# Patient Record
Sex: MALE | Race: WHITE | NOT HISPANIC OR LATINO | Employment: FULL TIME | URBAN - NONMETROPOLITAN AREA
[De-identification: names, ages, dates, MRNs, and addresses within clinical notes are randomized per-mention and may not be internally consistent; named-entity substitution may affect disease eponyms.]

---

## 2017-01-16 ENCOUNTER — OFFICE VISIT (OUTPATIENT)
Dept: GASTROENTEROLOGY | Facility: CLINIC | Age: 67
End: 2017-01-16

## 2017-01-16 VITALS
TEMPERATURE: 96.9 F | DIASTOLIC BLOOD PRESSURE: 90 MMHG | SYSTOLIC BLOOD PRESSURE: 132 MMHG | WEIGHT: 168 LBS | BODY MASS INDEX: 22.75 KG/M2 | HEIGHT: 72 IN | HEART RATE: 76 BPM

## 2017-01-16 DIAGNOSIS — K50.00 CROHN'S DISEASE OF SMALL INTESTINE WITHOUT COMPLICATION (HCC): ICD-10-CM

## 2017-01-16 DIAGNOSIS — R11.0 NAUSEA: ICD-10-CM

## 2017-01-16 DIAGNOSIS — K56.609 SMALL BOWEL OBSTRUCTION (HCC): Primary | ICD-10-CM

## 2017-01-16 PROCEDURE — 99213 OFFICE O/P EST LOW 20 MIN: CPT | Performed by: INTERNAL MEDICINE

## 2017-01-16 RX ORDER — BUDESONIDE 3 MG/1
CAPSULE, COATED PELLETS ORAL
Qty: 90 CAPSULE | Refills: 1 | Status: SHIPPED | OUTPATIENT
Start: 2017-01-16 | End: 2017-07-06

## 2017-01-16 NOTE — PROGRESS NOTES
[facility]    Chief Complaint   Patient presents with   • Crohn's Disease       Subjective     HPI    Vinicio Ring is a 66 y.o. male who presents with a chief complaint of  Crohn's.  Since he was here in mid November, he has had 3 episodes of abdominal discomfort.  He describes usually late in the evening a ache that starts in his lower abdomen.  Turns into an intense cramp with abdominal distention.  He has some nausea with it.  Usually lasts about 3-4 hours.  Then it subsides.  A few weeks ago he called with one these episodes early in the morning.  By the time he got around to being able to go  his Medrol Dosepak that we called in, his symptoms had fully subsided.  He did not take the Medrol Dosepak.  He has felt well in between.  He did tell me he was not taking his Pentasa properly as he was only taking 1 tablet twice a day, he is now taking 2 twice a day.  Stress levels doing okay.  He is going to [place] a couple weeks for work which he states will be stressful.  Starting to exercise and lifts weights, light weight.  He has lost some weight is down 5 pounds from November but about the weight he was in October.  His any fever chills sweats.  No melena bright red blood per rectum.  In between the 3 episodes he has been asymptomatic.  He has had low bit of right flank pain after he eats sometimes.      Past Medical History   Diagnosis Date   • Crohn's disease    • GERD (gastroesophageal reflux disease)          Current Outpatient Prescriptions:   •  ANDROGEL PUMP 20.25 MG/ACT (1.62%) gel, DAISY TWO PUMPS TO RIGHT ARM AND 1 PUMP TO LEFT ARM D, Disp: , Rfl: 2  •  Famotidine (PEPCID AC PO), Take  by mouth Every Night., Disp: , Rfl:   •  mesalamine (PENTASA) 500 MG CR capsule, Take 2 capsules by mouth 2 (Two) Times a Day., Disp: 120 capsule, Rfl: 11  •  ondansetron (ZOFRAN) 8 MG tablet, Take 1 tablet by mouth Every 8 (Eight) Hours As Needed for nausea or vomiting., Disp: 60 tablet,  Rfl: 0  •  Budesonide (ENTOCORT EC) 3 MG 24 hr capsule, Take 3 pills by mouth x 30 days, take 2 pills by mouth x 1 month, take 1 pill by mouth x 1 month, Disp: 90 capsule, Rfl: 1  •  MethylPREDNISolone (MEDROL, MIKE,) 4 MG tablet, Take as directed on package instructions., Disp: 21 tablet, Rfl: 0    No Known Allergies    Social History     Social History   • Marital status:      Spouse name: N/A   • Number of children: N/A   • Years of education: N/A     Occupational History   • Not on file.     Social History Main Topics   • Smoking status: Never Smoker   • Smokeless tobacco: Not on file   • Alcohol use 8.4 oz/week     14 Glasses of wine per week   • Drug use: No   • Sexual activity: Not on file     Other Topics Concern   • Not on file     Social History Narrative       Family History   Problem Relation Age of Onset   • No Known Problems Father    • No Known Problems Mother    • Colon cancer Maternal Aunt    • Colon polyps Paternal Uncle        Review of Systems   Constitutional: Negative for chills and fever.   HENT: Negative for congestion.    Respiratory: Negative for cough and shortness of breath.    Cardiovascular: Negative for chest pain and palpitations.   Gastrointestinal: Negative for abdominal distention, abdominal pain, anal bleeding, blood in stool, constipation, diarrhea, nausea, rectal pain and vomiting.         Objective     Vitals:    01/16/17 1513   BP: 132/90   Pulse: 76   Temp: 96.9 °F (36.1 °C)       Physical Exam   Constitutional: He appears well-developed and well-nourished.   Eyes: Conjunctivae and EOM are normal.   Cardiovascular: Normal rate, regular rhythm and normal heart sounds.    Pulmonary/Chest: Effort normal and breath sounds normal.   Abdominal: Soft. Bowel sounds are normal. He exhibits no distension and no mass. There is no tenderness. There is no rebound and no guarding.   Musculoskeletal: He exhibits no edema.   Skin: Skin is warm.         Assessment/Plan      Vinicio barnes  seen today for crohn's disease.    Diagnoses and all orders for this visit:    Small bowel obstruction    Crohn's disease of small intestine without complication    Nausea    Other orders  -     Budesonide (ENTOCORT EC) 3 MG 24 hr capsule; Take 3 pills by mouth x 30 days, take 2 pills by mouth x 1 month, take 1 pill by mouth x 1 month      Suspect these had 3 episodes of partial small bowel obstruction that subsided after a few hours on the round.  We discuss where this is due to active inflammatory disease or fibrotic disease.  It is difficult to tell.  Once again talked about the difference between step up therapy and stepdown therapy.  With his active symptoms I do think is reasonable to place him on a short course of Entocort.  I went through the side effects of steroids in great detail including those of Entocort.  We talked about alternatives such as Imuran or Humira and even surgical intervention.  I do not believe we need to step up to those levels at this point.  Hopefully with a tapering course of Entocort over 3 months along with Pentasa will break his cycle did not be able to maintain him on Pentasa.  He is instructed to take Entocort 3 tablets daily for a month, then 2 tablets daily for a month, then 1 tablet daily for month, then off.  He expressed understanding agrees to proceed with this line of treatment.  In the meantime if he has any recurrent trouble keith also contact us.  I I stated we may need to treat him with a Medrol Dosepak if he would have acute flare again.  We talked about the pros and cons of this as well as its side effects.  Questions were answered expressed good understanding.  Continue ongoing management by primary care provider and other specialists.     EMR Dragon/transcription disclaimer:  Much of this encounter note is electronic transcription/translation of spoken language to printed text.  The electronic translation of spoken language may be erroneous, or at times,  nonsensical words or phrases may be inadvertently transcribed.  Although I have reviewed the note for such errors, some may still exist.    Hector Mooney MD  4:11 PM  01/16/17

## 2017-01-16 NOTE — LETTER
January 16, 2017     Maynor Rendon MD  [address]  [facility] [address]    Patient: Vinicio Ring   YOB: 1950   Date of Visit: 1/16/2017       Dear Dr. Justice MD:    Thank you for referring Vinicio Ring to me for evaluation. Below are the relevant portions of my assessment and plan of care.         Vinicio was seen today for crohn's disease.    Diagnoses and all orders for this visit:    Small bowel obstruction    Crohn's disease of small intestine without complication    Nausea    Other orders  -     Budesonide (ENTOCORT EC) 3 MG 24 hr capsule; Take 3 pills by mouth x 30 days, take 2 pills by mouth x 1 month, take 1 pill by mouth x 1 month      Suspect these had 3 episodes of partial small bowel obstruction that subsided after a few hours on the round.  We discuss where this is due to active inflammatory disease or fibrotic disease.  It is difficult to tell.  Once again talked about the difference between step up therapy and stepdown therapy.  With his active symptoms I do think is reasonable to place him on a short course of Entocort.  I went through the side effects of steroids in great detail including those of Entocort.  We talked about alternatives such as Imuran or Humira and even surgical intervention.  I do not believe we need to step up to those levels at this point.  Hopefully with a tapering course of Entocort over 3 months along with Pentasa will break his cycle did not be able to maintain him on Pentasa.  He is instructed to take Entocort 3 tablets daily for a month, then 2 tablets daily for a month, then 1 tablet daily for month, then off.  He expressed understanding agrees to proceed with this line of treatment.  In the meantime if he has any recurrent trouble keith also contact us.  I I stated we may need to treat him with a Medrol Dosepak if he would have acute flare again.  We talked about the pros and cons of this as well as its side effects.   Questions were answered expressed good understanding.  Continue ongoing management by primary care provider and other specialists.     EMR Dragon/transcription disclaimer:  Much of this encounter note is electronic transcription/translation of spoken language to printed text.  The electronic translation of spoken language may be erroneous, or at times, nonsensical words or phrases may be inadvertently transcribed.  Although I have reviewed the note for such errors, some may still exist.    Hector Mooney MD  4:11 PM  01/16/17                If you have questions, please do not hesitate to call me. I look forward to following Vinicio along with you.         Sincerely,        Hector Mooney MD        CC: No Recipients

## 2017-01-16 NOTE — MR AVS SNAPSHOT
Vinicio Ring   1/16/2017 3:15 PM   Office Visit    Dept Phone:  [phone]   Encounter #:  [id number]    Provider:  Hector Mooney MD   Department:  [facility]                Your Full Care Plan              Today's Medication Changes          These changes are accurate as of: 1/16/17  4:09 PM.  If you have any questions, ask your nurse or doctor.               New Medication(s)Ordered:     Budesonide 3 MG 24 hr capsule   Commonly known as:  ENTOCORT EC   Take 3 pills by mouth x 30 days, take 2 pills by mouth x 1 month, take 1 pill by mouth x 1 month   Started by:  Hector Mooney MD            Where to Get Your Medications      These medications were sent to [facility] [address] [facility], [place] - [address] AT [facility] [address] - [phone] [facility] [phone] FX  [address], [facility] [address]    Hours:  24-hours Phone:  [phone]     Budesonide 3 MG 24 hr capsule                  Your Updated Medication List          This list is accurate as of: 1/16/17  4:09 PM.  Always use your most recent med list.                ANDROGEL PUMP 20.25 MG/ACT (1.62%) gel   Generic drug:  Testosterone       Budesonide 3 MG 24 hr capsule   Commonly known as:  ENTOCORT EC   Take 3 pills by mouth x 30 days, take 2 pills by mouth x 1 month, take 1 pill by mouth x 1 month       mesalamine 500 MG CR capsule   Commonly known as:  PENTASA   Take 2 capsules by mouth 2 (Two) Times a Day.       MethylPREDNISolone 4 MG tablet   Commonly known as:  MEDROL (MIKE)   Take as directed on package instructions.       ondansetron 8 MG tablet   Commonly known as:  ZOFRAN   Take 1 tablet by mouth Every 8 (Eight) Hours As Needed for nausea or vomiting.       PEPCID AC PO               Instructions     None    Patient Instructions History      Upcoming Appointments     Visit Type Date Time Department    OFFICE VISIT 1/16/2017  3:15 PM [facility]  "[facility]    FOLLOW UP 3/27/2017  3:15 PM [facility]      MyChart Signup     [facility] [email or web address] allows you to send messages to your doctor, view your test results, renew your prescriptions, schedule appointments, and more. To sign up, go to [employer] and click on the Sign Up Now link in the New User? box. Enter your [email or web address] Activation Code exactly as it appears below along with the last four digits of your Social Security Number and your Date of Birth () to complete the sign-up process. If you do not sign up before the expiration date, you must request a new code.    [employer] Activation Code: [email or web address]  Expires: 2017  4:06 PM    If you have questions, you can email [email or web address] or call [phone] to talk to our [email or web address] staff. Remember, [email or web address] is NOT to be used for urgent needs. For medical emergencies, dial 911.               Other Info from Your Visit           Your Appointments     Mar 27, 2017  3:15 PM CDT   Follow Up with Hector Mooney MD   [facility] (--)    [address] [place] [address] [address]  [facility] [address] [phone]           Arrive 15 minutes prior to appointment.              Allergies     No Known Allergies      Reason for Visit     Crohn's Disease           Vital Signs     Blood Pressure Pulse Temperature Height Weight Body Mass Index    132/90 (BP Location: Left arm, Patient Position: Sitting, Cuff Size: Adult) 76 96.9 °F (36.1 °C) 72\" (182.9 cm) 168 lb (76.2 kg) 22.78 kg/m2    Smoking Status                   Never Smoker             "

## 2017-03-27 ENCOUNTER — OFFICE VISIT (OUTPATIENT)
Dept: GASTROENTEROLOGY | Facility: CLINIC | Age: 67
End: 2017-03-27

## 2017-03-27 VITALS
SYSTOLIC BLOOD PRESSURE: 124 MMHG | DIASTOLIC BLOOD PRESSURE: 80 MMHG | BODY MASS INDEX: 22.62 KG/M2 | HEART RATE: 75 BPM | TEMPERATURE: 96.3 F | OXYGEN SATURATION: 96 % | HEIGHT: 72 IN | WEIGHT: 167 LBS

## 2017-03-27 DIAGNOSIS — K50.00 CROHN'S DISEASE OF SMALL INTESTINE WITHOUT COMPLICATION (HCC): Primary | ICD-10-CM

## 2017-03-27 DIAGNOSIS — K56.609 SMALL BOWEL OBSTRUCTION (HCC): ICD-10-CM

## 2017-03-27 PROCEDURE — 99213 OFFICE O/P EST LOW 20 MIN: CPT | Performed by: INTERNAL MEDICINE

## 2017-03-27 RX ORDER — MESALAMINE 500 MG/1
1000 CAPSULE, EXTENDED RELEASE ORAL 4 TIMES DAILY
Qty: 120 CAPSULE | Refills: 11 | Status: SHIPPED | OUTPATIENT
Start: 2017-03-27 | End: 2018-02-12 | Stop reason: SDUPTHER

## 2017-03-27 RX ORDER — MULTIPLE VITAMINS W/ MINERALS TAB 9MG-400MCG
1 TAB ORAL DAILY
COMMUNITY
End: 2018-02-12

## 2017-03-27 NOTE — PROGRESS NOTES
[facility]    Chief Complaint   Patient presents with   • Follow-up       Subjective     HPI    Vinicio Ring is a 66 y.o. male who presents with a chief complaint of  Crohn's.  When he was here 3 months ago we put him on a tapering course of Entocort over 3 months.  He continued on Pentasa 500 mg 2 tablets twice a day.  He is now on one Entocort tablet daily and has been on such for about 10 days.  When he did drop down to the 1 tablet, he did notice 1 episode of lower abdominal discomfort.  It was nothing significant he stated.  He just fell little bit uncomfortable.  It did not last very long.  He has been able to eat great.  He has had no episodes of bowel obstruction these had in the past.  His weight is down 1 pound from 3 months ago.  There is no nausea or vomiting.  There is no diarrhea.  There is no fever chills or sweats.  He is planning to go to [place] he tells me in July for wedding at a major resort.    One other thing he noted when I was going through review of systems with him, is that he has had a couple times a/white light in his left eye.  He states it lasts only seconds.  He felt it may have been an Radha.  He has no arthralgias.  He has no history of ocular disease.  It is been a year since he has seen his ophthalmologist.      Past Medical History:   Diagnosis Date   • Crohn's disease    • GERD (gastroesophageal reflux disease)        Past Surgical History:   Procedure Laterality Date   • CAPSULE ENDOSCOPY N/A 10/19/2016    Procedure: CAPSULE ENDOSCOPY AGILE;  Surgeon: Hector Mooney MD;  Location: [facility] ENDOSCOPY;  Service:    • CHOLECYSTECTOMY     • CHOLECYSTECTOMY     • COLONOSCOPY N/A 10/19/2016    Procedure: COLONOSCOPY WITH ANESTHESIA;  Surgeon: Hector Mooney MD;  Location: [facility] ENDOSCOPY;  Service:    • ENDOSCOPY N/A 10/19/2016    Procedure: ESOPHAGOGASTRODUODENOSCOPY WITH ANESTHESIA;  Surgeon: Hector Mooney MD;  Location: [facility] ENDOSCOPY;   Service:          Current Outpatient Prescriptions:   •  ANDROGEL PUMP 20.25 MG/ACT (1.62%) gel, DAISY TWO PUMPS TO RIGHT ARM AND 1 PUMP TO LEFT ARM D, Disp: , Rfl: 2  •  Budesonide (ENTOCORT EC) 3 MG 24 hr capsule, Take 3 pills by mouth x 30 days, take 2 pills by mouth x 1 month, take 1 pill by mouth x 1 month, Disp: 90 capsule, Rfl: 1  •  Famotidine (PEPCID AC PO), Take  by mouth Every Night., Disp: , Rfl:   •  mesalamine (PENTASA) 500 MG CR capsule, Take 2 capsules by mouth 4 (Four) Times a Day., Disp: 120 capsule, Rfl: 11  •  Multiple Vitamins-Minerals (MULTIVITAMIN WITH MINERALS) tablet tablet, Take 1 tablet by mouth Daily., Disp: , Rfl:   •  ondansetron (ZOFRAN) 8 MG tablet, Take 1 tablet by mouth Every 8 (Eight) Hours As Needed for nausea or vomiting., Disp: 60 tablet, Rfl: 0  •  MethylPREDNISolone (MEDROL, MIKE,) 4 MG tablet, Take as directed on package instructions., Disp: 21 tablet, Rfl: 0    No Known Allergies    Social History     Social History   • Marital status:      Spouse name: N/A   • Number of children: N/A   • Years of education: N/A     Occupational History   • Not on file.     Social History Main Topics   • Smoking status: Never Smoker   • Smokeless tobacco: Not on file   • Alcohol use 8.4 oz/week     14 Glasses of wine per week      Comment: daily   • Drug use: No   • Sexual activity: Not on file     Other Topics Concern   • Not on file     Social History Narrative       Family History   Problem Relation Age of Onset   • No Known Problems Father    • No Known Problems Mother    • Colon cancer Maternal Aunt    • Colon polyps Paternal Uncle        Review of Systems  General no fever chills or sweats weight stable  Gastrointestinal: Not present-abdominal pain, constipation, diarrhea, dysphagia, hematemesis, melena, odynophagia, nausea, vomiting, pyrosis, regurgitation, hematochezia,    Objective     Vitals:    03/27/17 1459   BP: 124/80   Pulse: 75   Temp: 96.3 °F (35.7 °C)   SpO2: 96%        Physical Exam  No acute distress. Vital signs as documented. Skin warm and dry and without overt rashes. EOMI, sclera anicteric.  Neck without JVD or masses. Lungs clear to auscultation bilaterally, no rales. Heart exam notable for regular rhythm, normal sounds and absence of loud murmurs, rubs or gallops. Abdomen is soft, nontender, non distended, normal bowel sounds and without evidence of organomegaly, masses, or abdominal aortic enlargement. Extremities nonedematous, no cyanosis. Neuro alert, moves extremities.    Assessment/Plan      Vinicio was seen today for follow-up.    Diagnoses and all orders for this visit:    Crohn's disease of small intestine without complication    Small bowel obstruction    Other orders  -     mesalamine (PENTASA) 500 MG CR capsule; Take 2 capsules by mouth 4 (Four) Times a Day.      At a long discussion with him again about step up versus stepdown therapy.  As reminder his [facility] labs were inconclusive.  I do feel he has Crohn's.  I am concerned about the little bit of discomfort he had when he weaned his Entocort to once daily.  After long conversation about proceeding with step up therapy now or observing, we chose continue treatment with Pentasa.  We will maximize the dose at 4 g in divided doses daily.  Ideally he should take 2 tablets 4 times a day and he will do so he states.  He will taper off the Entocort.  The last 14 days of therapy he will take 1 tablet every other day as discussed in detail.  We will then see how he does.    I advised if he has any recurrent abdominal discomfort, to contact me.  If he does have increased symptoms off Entocort and on max Pentasa therapy, then we plan to step up therapy to probably Imuran.  I discussed Imuran with him in detail.  We discussed the side effects including that immunosuppression.  We discussed how this makes him more prone to infections.  We discussed T-cell lymphoma.  There is liver toxicity as well as bone marrow  toxicity.  We discussed prior to initiating therapy I would recommend [facility] labs to determine his enzyme activity.  He would need a TB test as well as hepatitis testing.  He would also need to be cautious when going out of the country while on immunosuppressive drugs.  We discussed of Imuran is started we would want to stay on at least 2 years if not longer.  Discuss it does take a little time to take full effect.  Questions were answered and he expressed understanding.    I will see him back in the office in 3 months.  Sooner if he develops any signs or symptoms.  Also if he has an acute attack of his abdominal pain, he will start the Medrol Dosepak that we have prescribed and that he has on standby.    Lastly, regarding his mentioning of the Radha sensation with his left eye, I discussed with him how inflammatory bowel disease can affect other areas of the body such as the eye.  I discussed how he can be prone to getting uveitis or iritis.  The symptom is not classic for this but I strongly advised him to go see his ophthalmologist for evaluation.  He states he has 1 and he  will go see them.    Continue ongoing management by primary care provider and other specialists.     EMR Dragon/transcription disclaimer:  Much of this encounter note is electronic transcription/translation of spoken language to printed text.  The electronic translation of spoken language may be erroneous, or at times, nonsensical words or phrases may be inadvertently transcribed.  Although I have reviewed the note for such errors, some may still exist.    Hector Mooney MD  3:53 PM  03/27/17

## 2017-05-02 ENCOUNTER — TELEPHONE (OUTPATIENT)
Dept: GASTROENTEROLOGY | Facility: CLINIC | Age: 67
End: 2017-05-02

## 2017-05-02 DIAGNOSIS — K50.012 CROHN'S DISEASE OF SMALL INTESTINE WITH INTESTINAL OBSTRUCTION (HCC): Primary | ICD-10-CM

## 2017-05-02 RX ORDER — METHYLPREDNISOLONE 4 MG/1
TABLET ORAL
Qty: 21 TABLET | Refills: 0 | Status: SHIPPED | OUTPATIENT
Start: 2017-05-02 | End: 2017-07-06

## 2017-05-03 ENCOUNTER — LAB (OUTPATIENT)
Dept: LAB | Facility: HOSPITAL | Age: 67
End: 2017-05-03
Attending: INTERNAL MEDICINE

## 2017-05-03 DIAGNOSIS — K50.012 CROHN'S DISEASE OF SMALL INTESTINE WITH INTESTINAL OBSTRUCTION (HCC): ICD-10-CM

## 2017-05-03 LAB
ALBUMIN SERPL-MCNC: 3.8 G/DL (ref 3.5–5)
ALBUMIN/GLOB SERPL: 1.4 G/DL (ref 1.1–2.5)
ALP SERPL-CCNC: 109 U/L (ref 24–120)
ALT SERPL W P-5'-P-CCNC: 23 U/L (ref 0–54)
ANION GAP SERPL CALCULATED.3IONS-SCNC: 9 MMOL/L (ref 4–13)
AST SERPL-CCNC: 21 U/L (ref 7–45)
BASOPHILS # BLD AUTO: 0.04 10*3/MM3 (ref 0–0.2)
BASOPHILS NFR BLD AUTO: 0.6 % (ref 0–2)
BILIRUB SERPL-MCNC: 0.6 MG/DL (ref 0.1–1)
BUN BLD-MCNC: 17 MG/DL (ref 5–21)
BUN/CREAT SERPL: 19.8 (ref 7–25)
CALCIUM SPEC-SCNC: 9.1 MG/DL (ref 8.4–10.4)
CHLORIDE SERPL-SCNC: 100 MMOL/L (ref 98–110)
CO2 SERPL-SCNC: 29 MMOL/L (ref 24–31)
CREAT BLD-MCNC: 0.86 MG/DL (ref 0.5–1.4)
CRP SERPL-MCNC: <0.5 MG/DL (ref 0–0.99)
DEPRECATED RDW RBC AUTO: 40.7 FL (ref 40–54)
EOSINOPHIL # BLD AUTO: 0.12 10*3/MM3 (ref 0–0.7)
EOSINOPHIL NFR BLD AUTO: 1.8 % (ref 0–4)
ERYTHROCYTE [DISTWIDTH] IN BLOOD BY AUTOMATED COUNT: 12.6 % (ref 12–15)
GFR SERPL CREATININE-BSD FRML MDRD: 90 ML/MIN/1.73
GLOBULIN UR ELPH-MCNC: 2.7 GM/DL
GLUCOSE BLD-MCNC: 95 MG/DL (ref 70–100)
HAV IGM SERPL QL IA: NEGATIVE
HBV CORE IGM SERPL QL IA: NEGATIVE
HBV SURFACE AG SERPL QL IA: NEGATIVE
HCT VFR BLD AUTO: 46.2 % (ref 40–52)
HCV AB SER DONR QL: NEGATIVE
HCV S/C RATIO: 0.02 (ref 0–0.99)
HGB BLD-MCNC: 16.2 G/DL (ref 14–18)
IMM GRANULOCYTES # BLD: 0.14 10*3/MM3 (ref 0–0.03)
IMM GRANULOCYTES NFR BLD: 2.1 % (ref 0–5)
LYMPHOCYTES # BLD AUTO: 1.49 10*3/MM3 (ref 0.72–4.86)
LYMPHOCYTES NFR BLD AUTO: 22 % (ref 15–45)
MCH RBC QN AUTO: 31.2 PG (ref 28–32)
MCHC RBC AUTO-ENTMCNC: 35.1 G/DL (ref 33–36)
MCV RBC AUTO: 89 FL (ref 82–95)
MONOCYTES # BLD AUTO: 0.55 10*3/MM3 (ref 0.19–1.3)
MONOCYTES NFR BLD AUTO: 8.1 % (ref 4–12)
NEUTROPHILS # BLD AUTO: 4.43 10*3/MM3 (ref 1.87–8.4)
NEUTROPHILS NFR BLD AUTO: 65.4 % (ref 39–78)
PLATELET # BLD AUTO: 203 10*3/MM3 (ref 130–400)
PMV BLD AUTO: 10 FL (ref 6–12)
POTASSIUM BLD-SCNC: 4.1 MMOL/L (ref 3.5–5.3)
PROT SERPL-MCNC: 6.5 G/DL (ref 6.3–8.7)
RBC # BLD AUTO: 5.19 10*6/MM3 (ref 4.8–5.9)
SODIUM BLD-SCNC: 138 MMOL/L (ref 135–145)
WBC NRBC COR # BLD: 6.77 10*3/MM3 (ref 4.8–10.8)

## 2017-05-03 PROCEDURE — 82657 ENZYME CELL ACTIVITY: CPT | Performed by: INTERNAL MEDICINE

## 2017-05-03 PROCEDURE — 80053 COMPREHEN METABOLIC PANEL: CPT | Performed by: INTERNAL MEDICINE

## 2017-05-03 PROCEDURE — 36415 COLL VENOUS BLD VENIPUNCTURE: CPT

## 2017-05-03 PROCEDURE — 84154 ASSAY OF PSA FREE: CPT | Performed by: UROLOGY

## 2017-05-03 PROCEDURE — 85025 COMPLETE CBC W/AUTO DIFF WBC: CPT | Performed by: INTERNAL MEDICINE

## 2017-05-03 PROCEDURE — 80074 ACUTE HEPATITIS PANEL: CPT | Performed by: INTERNAL MEDICINE

## 2017-05-03 PROCEDURE — 84153 ASSAY OF PSA TOTAL: CPT | Performed by: UROLOGY

## 2017-05-03 PROCEDURE — 86140 C-REACTIVE PROTEIN: CPT | Performed by: INTERNAL MEDICINE

## 2017-05-03 PROCEDURE — 82542 COL CHROMOTOGRAPHY QUAL/QUAN: CPT | Performed by: INTERNAL MEDICINE

## 2017-05-05 DIAGNOSIS — K50.00 CROHN'S DISEASE OF SMALL INTESTINE WITHOUT COMPLICATION (HCC): Primary | ICD-10-CM

## 2017-05-11 LAB — REF LAB TEST METHOD: NORMAL

## 2017-05-16 ENCOUNTER — TELEPHONE (OUTPATIENT)
Dept: GASTROENTEROLOGY | Facility: CLINIC | Age: 67
End: 2017-05-16

## 2017-06-12 ENCOUNTER — LAB (OUTPATIENT)
Dept: LAB | Facility: HOSPITAL | Age: 67
End: 2017-06-12

## 2017-06-12 ENCOUNTER — TRANSCRIBE ORDERS (OUTPATIENT)
Dept: ADMINISTRATIVE | Facility: HOSPITAL | Age: 67
End: 2017-06-12

## 2017-06-12 DIAGNOSIS — K50.012 CROHN'S DISEASE OF SMALL INTESTINE WITH INTESTINAL OBSTRUCTION (HCC): Primary | ICD-10-CM

## 2017-06-12 DIAGNOSIS — K50.00 CROHN'S DISEASE OF SMALL INTESTINE WITHOUT COMPLICATION (HCC): Primary | ICD-10-CM

## 2017-06-12 DIAGNOSIS — K50.012 CROHN'S DISEASE OF SMALL INTESTINE WITH INTESTINAL OBSTRUCTION (HCC): ICD-10-CM

## 2017-06-12 PROCEDURE — 86481 TB AG RESPONSE T-CELL SUSP: CPT | Performed by: INTERNAL MEDICINE

## 2017-06-12 PROCEDURE — 36415 COLL VENOUS BLD VENIPUNCTURE: CPT

## 2017-06-14 LAB — REF LAB TEST METHOD: NORMAL

## 2017-07-06 ENCOUNTER — OFFICE VISIT (OUTPATIENT)
Dept: GASTROENTEROLOGY | Facility: CLINIC | Age: 67
End: 2017-07-06

## 2017-07-06 VITALS
OXYGEN SATURATION: 98 % | TEMPERATURE: 95.3 F | SYSTOLIC BLOOD PRESSURE: 118 MMHG | DIASTOLIC BLOOD PRESSURE: 92 MMHG | BODY MASS INDEX: 22.75 KG/M2 | HEIGHT: 72 IN | HEART RATE: 81 BPM | WEIGHT: 168 LBS

## 2017-07-06 DIAGNOSIS — K50.012 CROHN'S DISEASE OF SMALL INTESTINE WITH INTESTINAL OBSTRUCTION (HCC): Primary | ICD-10-CM

## 2017-07-06 PROCEDURE — 99213 OFFICE O/P EST LOW 20 MIN: CPT | Performed by: INTERNAL MEDICINE

## 2017-07-06 RX ORDER — METHYLPREDNISOLONE 4 MG/1
TABLET ORAL
Qty: 21 TABLET | Refills: 0 | Status: SHIPPED | OUTPATIENT
Start: 2017-07-06 | End: 2017-09-12 | Stop reason: ALTCHOICE

## 2017-07-06 RX ORDER — AZATHIOPRINE 50 MG/1
50 TABLET ORAL DAILY
Qty: 30 TABLET | Refills: 6 | Status: SHIPPED | OUTPATIENT
Start: 2017-07-06 | End: 2017-08-05

## 2017-07-06 NOTE — PROGRESS NOTES
[facility]    Chief Complaint   Patient presents with   • Follow-up       Subjective location just holding    HPI    Vinicio Ring is a 66 y.o. male who presents with a chief complaint of  Crohn's.  He is doing well at this time.  He called several weeks ago complaining of bowel obstruction type symptoms again.  He was able to subside this with the Medrol Dosepak.  He is back to his normal self now.  He plans to go to [place] at the end of month and then [place].  He still taking his Pentasa.  He is having one bowel movement usually formed daily no melena bright red blood per rectum        Past Medical History:   Diagnosis Date   • Crohn's disease    • GERD (gastroesophageal reflux disease)        Past Surgical History:   Procedure Laterality Date   • CAPSULE ENDOSCOPY N/A 10/19/2016    Procedure: CAPSULE ENDOSCOPY AGILE;  Surgeon: Hector Mooney MD;  Location:  [facility];  Service:    • CHOLECYSTECTOMY     • CHOLECYSTECTOMY     • COLONOSCOPY N/A 10/19/2016    Procedure: COLONOSCOPY WITH ANESTHESIA;  Surgeon: Hector Mooney MD;  Location:  [facility];  Service:    • ENDOSCOPY N/A 10/19/2016    Procedure: ESOPHAGOGASTRODUODENOSCOPY WITH ANESTHESIA;  Surgeon: Hector Mooney MD;  Location:  [facility];  Service:          Current Outpatient Prescriptions:   •  ANDROGEL PUMP 20.25 MG/ACT (1.62%) gel, DAISY TWO PUMPS TO RIGHT ARM AND 1 PUMP TO LEFT ARM D, Disp: , Rfl: 2  •  Famotidine (PEPCID AC PO), Take  by mouth Every Night., Disp: , Rfl:   •  mesalamine (PENTASA) 500 MG CR capsule, Take 2 capsules by mouth 4 (Four) Times a Day., Disp: 120 capsule, Rfl: 11  •  Multiple Vitamins-Minerals (MULTIVITAMIN WITH MINERALS) tablet tablet, Take 1 tablet by mouth Daily., Disp: , Rfl:   •  ondansetron (ZOFRAN) 8 MG tablet, Take 1 tablet by mouth Every 8 (Eight) Hours As Needed for nausea or vomiting. (Patient taking differently: Take 8 mg by mouth As Needed for Nausea or  Vomiting.), Disp: 60 tablet, Rfl: 0  •  azaTHIOprine (IMURAN) 50 MG tablet, Take 1 tablet by mouth Daily for 30 days., Disp: 30 tablet, Rfl: 6    No Known Allergies    Social History     Social History   • Marital status:      Spouse name: N/A   • Number of children: N/A   • Years of education: N/A     Occupational History   • Not on file.     Social History Main Topics   • Smoking status: Never Smoker   • Smokeless tobacco: Never Used   • Alcohol use 8.4 oz/week     14 Glasses of wine per week      Comment: daily   • Drug use: No   • Sexual activity: Not on file     Other Topics Concern   • Not on file     Social History Narrative       Family History   Problem Relation Age of Onset   • No Known Problems Father    • No Known Problems Mother    • Colon cancer Maternal Aunt    • Colon polyps Paternal Uncle        Review of Systems  General no fever chills or sweats weight stable  Gastrointestinal: Not present-abdominal pain, constipation, diarrhea, dysphagia, hematemesis, melena, odynophagia, nausea, vomiting, pyrosis, regurgitation, hematochezia,    Objective     Vitals:    07/06/17 1520   BP: 118/92   Pulse: 81   Temp: 95.3 °F (35.2 °C)   SpO2: 98%       Physical Exam  No acute distress. Vital signs as documented. Skin warm and dry and without overt rashes. EOMI, sclera anicteric.  Neck without JVD or masses. Lungs clear to auscultation bilaterally, no rales. Heart exam notable for regular rhythm, normal sounds and absence of loud murmurs, rubs or gallops. Abdomen is soft, nontender, non distended, normal bowel sounds and without evidence of organomegaly, masses, or abdominal aortic enlargement. Extremities nonedematous, no cyanosis. Neuro alert, moves extremities.    Assessment/Plan      Vinicio was seen today for follow-up.    Diagnoses and all orders for this visit:    Crohn's disease of small intestine with intestinal obstruction  -     C-reactive Protein; Future  -     CBC & Differential; Future  -      Comprehensive Metabolic Panel; Future    Other orders  -     azaTHIOprine (IMURAN) 50 MG tablet; Take 1 tablet by mouth Daily for 30 days.    We had a long discussion.  He has had intermittent episodes of partial small bowel obstructions.  With possibly underlying obstruction secondary to active Crohn's, we talked about the pros of stepping up therapy versus continue with Pentasa.  At this time I think the trend is that he is going need more advanced therapy and Pentasa.  He is demonstrating active symptoms and my concern is that he will have more significant flare of Crohn's with bowel obstruction that may  Significantly alter his health and lead to surgery or even complications.  We discussed the risk of the medications including Biologics and Imuran.  I will went through the possible side effects of Imuran again in great detail.  This was highlighted with discussions of infection risk, liver failure risk, immunosuppression, pancreatitis, T-cell lymphoma etc.  We discussed how his [facility] labs are intermediate for T PMT enzyme level and what that means.  Questions were answered.    After conversation he is in agreement to step up therapy with Imuran.  Because he has intermediate enzyme activity  I suggest we start him on low-dose Imuran at 50 mg.  I then recommend checking labs 2 weeks after he starts therapy.  We discussed how we will need labs periodically and chronically while on Imuran and the importance.  Depending on his lab results we can then decide whether to adjust therapy.  He will not start the medication until he returns from [place] in early August.  Therefore he get labs mid-August.  I will see him back here in 3 months.  I did give him a Medrol Dosepak for him to take with the 2 years if he should have start of his symptoms recur.         Continue ongoing management by primary care provider and other specialists.     EMR Dragon/transcription disclaimer:  Much of this encounter note is electronic  transcription/translation of spoken language to printed text.  The electronic translation of spoken language may be erroneous, or at times, nonsensical words or phrases may be inadvertently transcribed.  Although I have reviewed the note for such errors, some may still exist.    Hector Mooney MD  3:55 PM  07/06/17

## 2017-07-27 RX ORDER — METHYLPREDNISOLONE 4 MG/1
TABLET ORAL
Qty: 21 TABLET | Refills: 0 | Status: SHIPPED | OUTPATIENT
Start: 2017-07-27 | End: 2018-02-12

## 2017-07-27 NOTE — TELEPHONE ENCOUNTER
"Pt called and left  stating he would like to have a medrol dose nellie. He used the one he had and is going to [facility]. He states he intends to start \"the other Medicine\" when he returns.  "

## 2017-08-18 ENCOUNTER — TELEPHONE (OUTPATIENT)
Dept: GASTROENTEROLOGY | Facility: CLINIC | Age: 67
End: 2017-08-18

## 2017-08-18 ENCOUNTER — LAB (OUTPATIENT)
Dept: LAB | Facility: HOSPITAL | Age: 67
End: 2017-08-18
Attending: INTERNAL MEDICINE

## 2017-08-18 ENCOUNTER — APPOINTMENT (OUTPATIENT)
Dept: LAB | Facility: HOSPITAL | Age: 67
End: 2017-08-18
Attending: INTERNAL MEDICINE

## 2017-08-18 ENCOUNTER — TRANSCRIBE ORDERS (OUTPATIENT)
Dept: ADMINISTRATIVE | Facility: HOSPITAL | Age: 67
End: 2017-08-18

## 2017-08-18 DIAGNOSIS — D51.0 VITAMIN B12 DEFICIENCY ANEMIA DUE TO INTRINSIC FACTOR DEFICIENCY: ICD-10-CM

## 2017-08-18 DIAGNOSIS — K50.012 CROHN'S DISEASE OF SMALL INTESTINE WITH INTESTINAL OBSTRUCTION (HCC): ICD-10-CM

## 2017-08-18 DIAGNOSIS — E29.1 HYPOGONADISM, TESTICULAR: ICD-10-CM

## 2017-08-18 DIAGNOSIS — Z12.5 SPECIAL SCREENING FOR MALIGNANT NEOPLASM OF PROSTATE: ICD-10-CM

## 2017-08-18 DIAGNOSIS — E53.8 DEFICIENCY OF OTHER SPECIFIED B GROUP VITAMINS: Primary | ICD-10-CM

## 2017-08-18 DIAGNOSIS — E53.8 DEFICIENCY OF OTHER SPECIFIED B GROUP VITAMINS: ICD-10-CM

## 2017-08-18 LAB
ALBUMIN SERPL-MCNC: 4 G/DL (ref 3.5–5)
ALBUMIN/GLOB SERPL: 1.7 G/DL (ref 1.1–2.5)
ALP SERPL-CCNC: 104 U/L (ref 24–120)
ALT SERPL W P-5'-P-CCNC: 41 U/L (ref 0–54)
ANION GAP SERPL CALCULATED.3IONS-SCNC: 9 MMOL/L (ref 4–13)
ARTICHOKE IGE QN: 101 MG/DL (ref 0–99)
AST SERPL-CCNC: 25 U/L (ref 7–45)
BACTERIA UR QL AUTO: ABNORMAL /HPF
BASOPHILS # BLD AUTO: 0.02 10*3/MM3 (ref 0–0.2)
BASOPHILS NFR BLD AUTO: 0.4 % (ref 0–2)
BILIRUB SERPL-MCNC: 0.7 MG/DL (ref 0.1–1)
BILIRUB UR QL STRIP: NEGATIVE
BUN BLD-MCNC: 12 MG/DL (ref 5–21)
BUN/CREAT SERPL: 12.9 (ref 7–25)
CALCIUM SPEC-SCNC: 8.9 MG/DL (ref 8.4–10.4)
CHLORIDE SERPL-SCNC: 105 MMOL/L (ref 98–110)
CHOLEST SERPL-MCNC: 156 MG/DL (ref 130–200)
CLARITY UR: CLEAR
CO2 SERPL-SCNC: 26 MMOL/L (ref 24–31)
COLOR UR: YELLOW
CREAT BLD-MCNC: 0.93 MG/DL (ref 0.5–1.4)
CRP SERPL-MCNC: <0.5 MG/DL (ref 0–0.99)
DEPRECATED RDW RBC AUTO: 40.7 FL (ref 40–54)
EOSINOPHIL # BLD AUTO: 0.07 10*3/MM3 (ref 0–0.7)
EOSINOPHIL NFR BLD AUTO: 1.4 % (ref 0–4)
ERYTHROCYTE [DISTWIDTH] IN BLOOD BY AUTOMATED COUNT: 12.3 % (ref 12–15)
GFR SERPL CREATININE-BSD FRML MDRD: 82 ML/MIN/1.73
GLOBULIN UR ELPH-MCNC: 2.4 GM/DL
GLUCOSE BLD-MCNC: 95 MG/DL (ref 70–100)
GLUCOSE UR STRIP-MCNC: NEGATIVE MG/DL
HCT VFR BLD AUTO: 45.7 % (ref 40–52)
HDLC SERPL-MCNC: 44 MG/DL
HGB BLD-MCNC: 15.4 G/DL (ref 14–18)
HGB UR QL STRIP.AUTO: NEGATIVE
HYALINE CASTS UR QL AUTO: ABNORMAL /LPF
IMM GRANULOCYTES # BLD: 0.01 10*3/MM3 (ref 0–0.03)
IMM GRANULOCYTES NFR BLD: 0.2 % (ref 0–5)
KETONES UR QL STRIP: ABNORMAL
LDLC/HDLC SERPL: 2.25 {RATIO}
LEUKOCYTE ESTERASE UR QL STRIP.AUTO: ABNORMAL
LYMPHOCYTES # BLD AUTO: 0.94 10*3/MM3 (ref 0.72–4.86)
LYMPHOCYTES NFR BLD AUTO: 18.2 % (ref 15–45)
MCH RBC QN AUTO: 30.7 PG (ref 28–32)
MCHC RBC AUTO-ENTMCNC: 33.7 G/DL (ref 33–36)
MCV RBC AUTO: 91 FL (ref 82–95)
MONOCYTES # BLD AUTO: 0.39 10*3/MM3 (ref 0.19–1.3)
MONOCYTES NFR BLD AUTO: 7.5 % (ref 4–12)
NEUTROPHILS # BLD AUTO: 3.74 10*3/MM3 (ref 1.87–8.4)
NEUTROPHILS NFR BLD AUTO: 72.3 % (ref 39–78)
NITRITE UR QL STRIP: NEGATIVE
PH UR STRIP.AUTO: 6.5 [PH] (ref 5–8)
PLATELET # BLD AUTO: 184 10*3/MM3 (ref 130–400)
PMV BLD AUTO: 10.2 FL (ref 6–12)
POTASSIUM BLD-SCNC: 4.3 MMOL/L (ref 3.5–5.3)
PROT SERPL-MCNC: 6.4 G/DL (ref 6.3–8.7)
PROT UR QL STRIP: NEGATIVE
PSA SERPL-MCNC: 7.94 NG/ML (ref 0–4)
RBC # BLD AUTO: 5.02 10*6/MM3 (ref 4.8–5.9)
RBC # UR: ABNORMAL /HPF
REF LAB TEST METHOD: ABNORMAL
SODIUM BLD-SCNC: 140 MMOL/L (ref 135–145)
SP GR UR STRIP: 1.02 (ref 1–1.03)
SQUAMOUS #/AREA URNS HPF: ABNORMAL /HPF
TRIGL SERPL-MCNC: 66 MG/DL (ref 0–149)
UROBILINOGEN UR QL STRIP: ABNORMAL
VIT B12 BLD-MCNC: 484 PG/ML (ref 239–931)
WBC NRBC COR # BLD: 5.17 10*3/MM3 (ref 4.8–10.8)
WBC UR QL AUTO: ABNORMAL /HPF

## 2017-08-18 PROCEDURE — 82607 VITAMIN B-12: CPT | Performed by: INTERNAL MEDICINE

## 2017-08-18 PROCEDURE — 81003 URINALYSIS AUTO W/O SCOPE: CPT | Performed by: INTERNAL MEDICINE

## 2017-08-18 PROCEDURE — 86140 C-REACTIVE PROTEIN: CPT | Performed by: INTERNAL MEDICINE

## 2017-08-18 PROCEDURE — 85025 COMPLETE CBC W/AUTO DIFF WBC: CPT | Performed by: INTERNAL MEDICINE

## 2017-08-18 PROCEDURE — 36415 COLL VENOUS BLD VENIPUNCTURE: CPT | Performed by: INTERNAL MEDICINE

## 2017-08-18 PROCEDURE — 84403 ASSAY OF TOTAL TESTOSTERONE: CPT | Performed by: INTERNAL MEDICINE

## 2017-08-18 PROCEDURE — 80053 COMPREHEN METABOLIC PANEL: CPT | Performed by: INTERNAL MEDICINE

## 2017-08-18 PROCEDURE — 80061 LIPID PANEL: CPT | Performed by: INTERNAL MEDICINE

## 2017-08-18 PROCEDURE — 84153 ASSAY OF PSA TOTAL: CPT | Performed by: INTERNAL MEDICINE

## 2017-08-18 PROCEDURE — 81001 URINALYSIS AUTO W/SCOPE: CPT | Performed by: INTERNAL MEDICINE

## 2017-08-18 NOTE — TELEPHONE ENCOUNTER
For some reason this test did not get done today with the others. Does Dr. Mooney still want this test completed. It was entered correctly. Not sure why it was not completed with the others. Thanks.

## 2017-08-18 NOTE — TELEPHONE ENCOUNTER
Spoke with Lalita in Lab.  They are going to do the CRP.  He had orders with him and didn't see the order in the chart.

## 2017-08-19 LAB — TESTOST SERPL-MCNC: 630 NG/DL (ref 264–916)

## 2017-08-24 ENCOUNTER — TRANSCRIBE ORDERS (OUTPATIENT)
Dept: ADMINISTRATIVE | Facility: HOSPITAL | Age: 67
End: 2017-08-24

## 2017-08-24 ENCOUNTER — APPOINTMENT (OUTPATIENT)
Dept: LAB | Facility: HOSPITAL | Age: 67
End: 2017-08-24

## 2017-08-24 DIAGNOSIS — H43.89: Primary | ICD-10-CM

## 2017-08-24 LAB — ERYTHROCYTE [SEDIMENTATION RATE] IN BLOOD: 2 MM/HR (ref 0–15)

## 2017-08-24 PROCEDURE — 86038 ANTINUCLEAR ANTIBODIES: CPT

## 2017-08-24 PROCEDURE — 82164 ANGIOTENSIN I ENZYME TEST: CPT

## 2017-08-24 PROCEDURE — 81374 HLA I TYPING 1 ANTIGEN LR: CPT

## 2017-08-24 PROCEDURE — 85651 RBC SED RATE NONAUTOMATED: CPT

## 2017-08-24 PROCEDURE — 36415 COLL VENOUS BLD VENIPUNCTURE: CPT

## 2017-08-24 PROCEDURE — 86592 SYPHILIS TEST NON-TREP QUAL: CPT

## 2017-08-25 LAB
ACE SERPL-CCNC: 37 U/L (ref 14–82)
ANA SER QL IA: NEGATIVE
RPR SER QL: NON REACTIVE

## 2017-08-29 LAB — HLA-B27 QL NAA+PROBE: NEGATIVE

## 2017-09-12 ENCOUNTER — OFFICE VISIT (OUTPATIENT)
Dept: UROLOGY | Facility: CLINIC | Age: 67
End: 2017-09-12

## 2017-09-12 VITALS
SYSTOLIC BLOOD PRESSURE: 114 MMHG | WEIGHT: 168.2 LBS | TEMPERATURE: 97.6 F | HEIGHT: 72 IN | BODY MASS INDEX: 22.78 KG/M2 | DIASTOLIC BLOOD PRESSURE: 74 MMHG

## 2017-09-12 DIAGNOSIS — N40.0 ENLARGED PROSTATE: Primary | ICD-10-CM

## 2017-09-12 DIAGNOSIS — R97.20 ELEVATED PSA: ICD-10-CM

## 2017-09-12 DIAGNOSIS — E29.1 HYPOGONADISM, MALE: ICD-10-CM

## 2017-09-12 LAB
BILIRUB BLD-MCNC: NEGATIVE MG/DL
CLARITY, POC: CLEAR
COLOR UR: YELLOW
GLUCOSE UR STRIP-MCNC: NEGATIVE MG/DL
KETONES UR QL: NEGATIVE
LEUKOCYTE EST, POC: NEGATIVE
NITRITE UR-MCNC: NEGATIVE MG/ML
PH UR: 7 [PH] (ref 5–8)
PROT UR STRIP-MCNC: NEGATIVE MG/DL
RBC # UR STRIP: NEGATIVE /UL
SP GR UR: 1.02 (ref 1–1.03)
UROBILINOGEN UR QL: NORMAL

## 2017-09-12 PROCEDURE — 99214 OFFICE O/P EST MOD 30 MIN: CPT | Performed by: UROLOGY

## 2017-09-12 PROCEDURE — 81003 URINALYSIS AUTO W/O SCOPE: CPT | Performed by: UROLOGY

## 2017-09-12 NOTE — PROGRESS NOTES
Subjective    Mr. Ring is 66 y.o. male    CHIEF COMPLAINT: Elevated PSA    The patient comes back today for follow-up of elevated PSA he is recently had one drawn with Dr. Rendon's t 7.9.  His symptoms were made pretty much stable I he had been taking AndroGel for hypogonadism but he actually stopped that recently until he is we have determine what to do with his PSA.    Again he did have a biopsy about 10-11 years ago with Dr. Andrea Brasher apparently had a lot of difficulties afterwards went into retention had some bleeding in this sort of thing and really is very disheartened to think that he might have to have another biopsy.    There is no family history of prostate cancer and other than taking the AndroGel is no other significant risk factors.    He does have BPH symptoms as a way score today is 12 he does have to go a little more frequently than he like maybe getting up a couple times at night but I do not think he wants to consider any medication as of yet for his prostate          History of Present Illness      The following portions of the patient's history were reviewed and updated as appropriate: allergies, current medications, past family history, past medical history, past social history, past surgical history and problem list.    Review of Systems   Constitutional: Negative for appetite change, chills and fever.   HENT: Negative for hearing loss and sore throat.    Eyes: Negative for pain and redness.   Respiratory: Negative for cough and shortness of breath.    Cardiovascular: Negative for chest pain and leg swelling.   Gastrointestinal: Negative for abdominal pain, anal bleeding, blood in stool, nausea and vomiting.   Endocrine: Negative for cold intolerance and heat intolerance.   Genitourinary: Positive for frequency. Negative for difficulty urinating, dysuria, flank pain, hematuria and urgency.   Musculoskeletal: Negative for joint swelling and myalgias.   Skin: Negative for color change and  rash.   Allergic/Immunologic: Negative for immunocompromised state.   Neurological: Negative for dizziness and speech difficulty.   Hematological: Negative for adenopathy. Does not bruise/bleed easily.   Psychiatric/Behavioral: Negative for dysphoric mood and suicidal ideas.         Current Outpatient Prescriptions:   •  ANDROGEL PUMP 20.25 MG/ACT (1.62%) gel, DAISY TWO PUMPS TO RIGHT ARM AND 1 PUMP TO LEFT ARM D, Disp: , Rfl: 2  •  Famotidine (PEPCID AC PO), Take  by mouth Every Night., Disp: , Rfl:   •  mesalamine (PENTASA) 500 MG CR capsule, Take 2 capsules by mouth 4 (Four) Times a Day., Disp: 120 capsule, Rfl: 11  •  MethylPREDNISolone (MEDROL, MIKE,) 4 MG tablet, Take as directed on package instructions., Disp: 21 tablet, Rfl: 0  •  Multiple Vitamins-Minerals (MULTIVITAMIN WITH MINERALS) tablet tablet, Take 1 tablet by mouth Daily., Disp: , Rfl:   •  ondansetron (ZOFRAN) 8 MG tablet, Take 1 tablet by mouth Every 8 (Eight) Hours As Needed for nausea or vomiting. (Patient taking differently: Take 8 mg by mouth As Needed for Nausea or Vomiting.), Disp: 60 tablet, Rfl: 0    Past Medical History:   Diagnosis Date   • Crohn's disease    • GERD (gastroesophageal reflux disease)        Past Surgical History:   Procedure Laterality Date   • CAPSULE ENDOSCOPY N/A 10/19/2016    Procedure: CAPSULE ENDOSCOPY AGILE;  Surgeon: Hector Mooney MD;  Location: [facility] ENDOSCOPY;  Service:    • CHOLECYSTECTOMY     • CHOLECYSTECTOMY     • COLONOSCOPY N/A 10/19/2016    Procedure: COLONOSCOPY WITH ANESTHESIA;  Surgeon: Hector Mooney MD;  Location: [facility] ENDOSCOPY;  Service:    • ENDOSCOPY N/A 10/19/2016    Procedure: ESOPHAGOGASTRODUODENOSCOPY WITH ANESTHESIA;  Surgeon: Hector Mooney MD;  Location: [facility] ENDOSCOPY;  Service:        Social History     Social History   • Marital status:      Spouse name: N/A   • Number of children: N/A   • Years of education: N/A     Social History Main Topics   • Smoking status: Never  "Smoker   • Smokeless tobacco: Never Used   • Alcohol use 8.4 oz/week     14 Glasses of wine per week      Comment: daily   • Drug use: No   • Sexual activity: Not Asked     Other Topics Concern   • None     Social History Narrative       Family History   Problem Relation Age of Onset   • No Known Problems Father    • No Known Problems Mother    • Colon cancer Maternal Aunt    • Colon polyps Paternal Uncle        Objective    /74  Temp 97.6 °F (36.4 °C)  Ht 72\" (182.9 cm)  Wt 168 lb 3.2 oz (76.3 kg)  BMI 22.81 kg/m2    Physical Exam   Constitutional: He is oriented to person, place, and time. He appears well-developed and well-nourished.   Pulmonary/Chest: Effort normal.   Abdominal: Soft. He exhibits no distension and no mass. There is no tenderness. There is no rebound and no guarding. No hernia. Hernia confirmed negative in the right inguinal area and confirmed negative in the left inguinal area.   Genitourinary: Testes normal and penis normal. Rectal exam shows no mass, no tenderness and anal tone normal. Enlarged: for the age of the patient. Right testis shows no mass, no swelling and no tenderness. Left testis shows no mass, no swelling and no tenderness. Circumcised. No hypospadias. No discharge found.   Genitourinary Comments:  The urethral meatus normal in position without evidence of stricture. Epididymis without mass or tenderness. Vas Deferens is palpably normal.Anus and perineum without mass or tenderness. The prostate is approximately 35 ml. It is Symmetric, with a Soft consistency. There are no nodules present. . The seminal vesicles are Not palpable due to the size of the prostate.     Neurological: He is alert and oriented to person, place, and time.   Vitals reviewed.        Transcribe Orders on 08/24/2017   Component Date Value Ref Range Status   • Sed Rate 08/24/2017 2  0 - 15 mm/hr Final   • HLA B27 08/24/2017 Negative   Final    HLA-B*27 Negative  B27 allele interpretation for all loci " based on IMGT/HLA  database version 3.25  This test was developed and its performance characteristics  determined by [facility].  It has not been cleared or approved  by the Food and Drug Administration.  [facility] CLIA ID Number [id number]  This test was performed using PCR (Polymerase Chain Reaction)/SSOP  (Sequence Specific Oligonucleotide Probes) technique.  SBT (Sequence  Based Typing) and/or SSP (Sequence Specific Primers) may be used as  supplemental methods when necessary.  Please contact HLA Customer  Service at [phone] if you have any questions.   Director of [facility]   Dr Eugenio Gonzalez, PhD   • Angiotensin Converting Enzyme 08/24/2017 37  14 - 82 U/L Final   • RPR 08/24/2017 Non Reactive  Non Reactive Final   • TAISHA 08/24/2017 Negative   Final                                         Negative   <1:80                                       Borderline  1:80                                       Positive   >1:80       Results for orders placed or performed in visit on 09/12/17   POC Urinalysis Dipstick, Automated   Result Value Ref Range    Color Yellow Yellow, Straw, Dark Yellow, Lyla    Clarity, UA Clear Clear    Glucose, UA Negative Negative, 1000 mg/dL (3+) mg/dL    Bilirubin Negative Negative    Ketones, UA Negative Negative    Specific Gravity  1.020 1.005 - 1.030    Blood, UA Negative Negative    pH, Urine 7.0 5.0 - 8.0    Protein, POC Negative Negative mg/dL    Urobilinogen, UA Normal Normal    Leukocytes Negative Negative    Nitrite, UA Negative Negative       Assessment and Plan    Vinicio was seen today for benign prostatic hypertrophy.    Diagnoses and all orders for this visit:    Enlarged prostate  -     POC Urinalysis Dipstick, Automated  -     PSA; Future    Elevated PSA    Hypogonadism, male    Other orders  -     SCANNED - LABS  Plan--regarding the PSA I had a lengthy discussion with the patient regarding the options I told him it was probably an unfortunate side effect of the biopsy  did not have the gross hematuria and retention that that is a relatively unusual complication and hopefully if he had another biopsy that would not occur although obviously I could not guarantee that.    We discussed doing nothing observation here we also discussed ultrasound here in the office with a repeat biopsy we also discussed the fact that we will beginning an MRI here but probably not until first the year and then will have to be trained up on it or  I can refer him down to [place] to Dr. Mcmillan to do a fused MRI ultrasound.    Again I think I went over all these options at length and told him I felt probably at his young age and relative good health I would consider a biopsy in the relative near future.    After I discussed all the options he would like to talk to his wife about this will give me a call in the next few days let me know what he thinks

## 2017-09-13 ENCOUNTER — TELEPHONE (OUTPATIENT)
Dept: UROLOGY | Facility: CLINIC | Age: 67
End: 2017-09-13

## 2017-09-13 DIAGNOSIS — R97.20 ELEVATED PROSTATE SPECIFIC ANTIGEN (PSA): Primary | ICD-10-CM

## 2017-09-13 NOTE — TELEPHONE ENCOUNTER
I sent a referral for Dr. Mcmillan please call patient and give him time and instructions thank you

## 2017-09-17 ENCOUNTER — RESULTS ENCOUNTER (OUTPATIENT)
Dept: UROLOGY | Facility: CLINIC | Age: 67
End: 2017-09-17

## 2017-09-17 DIAGNOSIS — N40.0 ENLARGED PROSTATE: ICD-10-CM

## 2017-10-16 ENCOUNTER — APPOINTMENT (OUTPATIENT)
Dept: LAB | Facility: HOSPITAL | Age: 67
End: 2017-10-16
Attending: INTERNAL MEDICINE

## 2017-10-16 ENCOUNTER — OFFICE VISIT (OUTPATIENT)
Dept: GASTROENTEROLOGY | Facility: CLINIC | Age: 67
End: 2017-10-16

## 2017-10-16 VITALS
SYSTOLIC BLOOD PRESSURE: 118 MMHG | HEIGHT: 72 IN | HEART RATE: 70 BPM | OXYGEN SATURATION: 99 % | WEIGHT: 168 LBS | DIASTOLIC BLOOD PRESSURE: 82 MMHG | TEMPERATURE: 96.8 F | BODY MASS INDEX: 22.75 KG/M2

## 2017-10-16 DIAGNOSIS — K50.012 CROHN'S DISEASE OF SMALL INTESTINE WITH INTESTINAL OBSTRUCTION (HCC): ICD-10-CM

## 2017-10-16 DIAGNOSIS — K50.00 CROHN'S DISEASE OF SMALL INTESTINE WITHOUT COMPLICATION (HCC): Primary | ICD-10-CM

## 2017-10-16 LAB
ALBUMIN SERPL-MCNC: 4 G/DL (ref 3.5–5)
ALBUMIN/GLOB SERPL: 1.6 G/DL (ref 1.1–2.5)
ALP SERPL-CCNC: 114 U/L (ref 24–120)
ALT SERPL W P-5'-P-CCNC: 37 U/L (ref 0–54)
ANION GAP SERPL CALCULATED.3IONS-SCNC: 8 MMOL/L (ref 4–13)
AST SERPL-CCNC: 21 U/L (ref 7–45)
BASOPHILS # BLD AUTO: 0.02 10*3/MM3 (ref 0–0.2)
BASOPHILS NFR BLD AUTO: 0.3 % (ref 0–2)
BILIRUB SERPL-MCNC: 0.4 MG/DL (ref 0.1–1)
BUN BLD-MCNC: 11 MG/DL (ref 5–21)
BUN/CREAT SERPL: 13.4 (ref 7–25)
CALCIUM SPEC-SCNC: 9.1 MG/DL (ref 8.4–10.4)
CHLORIDE SERPL-SCNC: 104 MMOL/L (ref 98–110)
CO2 SERPL-SCNC: 27 MMOL/L (ref 24–31)
CREAT BLD-MCNC: 0.82 MG/DL (ref 0.5–1.4)
CRP SERPL-MCNC: <0.5 MG/DL (ref 0–0.99)
DEPRECATED RDW RBC AUTO: 41.9 FL (ref 40–54)
EOSINOPHIL # BLD AUTO: 0.07 10*3/MM3 (ref 0–0.7)
EOSINOPHIL NFR BLD AUTO: 1.1 % (ref 0–4)
ERYTHROCYTE [DISTWIDTH] IN BLOOD BY AUTOMATED COUNT: 13 % (ref 12–15)
GFR SERPL CREATININE-BSD FRML MDRD: 94 ML/MIN/1.73
GLOBULIN UR ELPH-MCNC: 2.5 GM/DL
GLUCOSE BLD-MCNC: 94 MG/DL (ref 70–100)
HCT VFR BLD AUTO: 42 % (ref 40–52)
HGB BLD-MCNC: 14.3 G/DL (ref 14–18)
IMM GRANULOCYTES # BLD: 0.02 10*3/MM3 (ref 0–0.03)
IMM GRANULOCYTES NFR BLD: 0.3 % (ref 0–5)
LYMPHOCYTES # BLD AUTO: 1.17 10*3/MM3 (ref 0.72–4.86)
LYMPHOCYTES NFR BLD AUTO: 17.7 % (ref 15–45)
MCH RBC QN AUTO: 30.7 PG (ref 28–32)
MCHC RBC AUTO-ENTMCNC: 34 G/DL (ref 33–36)
MCV RBC AUTO: 90.1 FL (ref 82–95)
MONOCYTES # BLD AUTO: 0.35 10*3/MM3 (ref 0.19–1.3)
MONOCYTES NFR BLD AUTO: 5.3 % (ref 4–12)
NEUTROPHILS # BLD AUTO: 4.97 10*3/MM3 (ref 1.87–8.4)
NEUTROPHILS NFR BLD AUTO: 75.3 % (ref 39–78)
PLATELET # BLD AUTO: 192 10*3/MM3 (ref 130–400)
PMV BLD AUTO: 10.5 FL (ref 6–12)
POTASSIUM BLD-SCNC: 4.2 MMOL/L (ref 3.5–5.3)
PROT SERPL-MCNC: 6.5 G/DL (ref 6.3–8.7)
RBC # BLD AUTO: 4.66 10*6/MM3 (ref 4.8–5.9)
SODIUM BLD-SCNC: 139 MMOL/L (ref 135–145)
WBC NRBC COR # BLD: 6.6 10*3/MM3 (ref 4.8–10.8)

## 2017-10-16 PROCEDURE — 86140 C-REACTIVE PROTEIN: CPT | Performed by: INTERNAL MEDICINE

## 2017-10-16 PROCEDURE — 36415 COLL VENOUS BLD VENIPUNCTURE: CPT | Performed by: UROLOGY

## 2017-10-16 PROCEDURE — 99213 OFFICE O/P EST LOW 20 MIN: CPT | Performed by: INTERNAL MEDICINE

## 2017-10-16 PROCEDURE — 85025 COMPLETE CBC W/AUTO DIFF WBC: CPT | Performed by: INTERNAL MEDICINE

## 2017-10-16 PROCEDURE — 84153 ASSAY OF PSA TOTAL: CPT | Performed by: UROLOGY

## 2017-10-16 PROCEDURE — 80053 COMPREHEN METABOLIC PANEL: CPT | Performed by: INTERNAL MEDICINE

## 2017-10-16 RX ORDER — AZATHIOPRINE 50 MG/1
TABLET ORAL
Refills: 6 | COMMUNITY
Start: 2017-08-24 | End: 2018-02-12 | Stop reason: SDUPTHER

## 2017-10-16 RX ORDER — PREDNISOLONE ACETATE 10 MG/ML
1 SUSPENSION/ DROPS OPHTHALMIC 3 TIMES DAILY
COMMUNITY
End: 2018-02-12

## 2017-10-16 NOTE — PROGRESS NOTES
[facility]    Chief Complaint   Patient presents with   • Crohn's Disease       Subjective     HPI    Vinicio Ring is a 67 y.o. male who presents with a chief complaint of  Crohn's.    He is doing well.  He denies any attacks of abdominal pain or discomfort or bloating.  He has had none since he has been here last.  When he was last in the office we did start him on low-dose Imuran.  His TPN T enzyme level was borderline.  So we only put him on 50 mg daily.  He states he started this towards the end of August when he returned from his care being trip.  He has tolerated it well.  He did not get his follow-up labs as previously recommended.    One thing that is new that he is undergone evaluation as an elevated PSA.  This was found in early August he states.  He has seen Dr. Desir from urology.  His AndroGel has been stopped.  He is scheduled for an MRI guided biopsy at [place] on December 20.  He has had a negative prostate biopsy in the past.    Lastly he notes occasional epigastric discomfort.  He describes a mild ache in his epigastric area that may last 1-2 days.  Does not necessarily come on with food.  It is only a mild ache.  It does not come on with exertion.  Has no other associated symptoms on review.  He denies any chest pains or shortness of breath.  No palpitations.  He is not having melena or bright red blood per rectum.  He does take his Pepcid at nighttime.      Past Medical History:   Diagnosis Date   • Crohn's disease    • Elevated PSA    • GERD (gastroesophageal reflux disease)        Past Surgical History:   Procedure Laterality Date   • CAPSULE ENDOSCOPY N/A 10/19/2016    Procedure: CAPSULE ENDOSCOPY AGILE;  Surgeon: Hector Mooney MD;  Location: [facility] ENDOSCOPY;  Service:    • CHOLECYSTECTOMY     • CHOLECYSTECTOMY     • COLONOSCOPY N/A 10/19/2016    Procedure: COLONOSCOPY WITH ANESTHESIA;  Surgeon: Hector Mooney MD;  Location: [facility] ENDOSCOPY;  Service:     • ENDOSCOPY N/A 10/19/2016    Procedure: ESOPHAGOGASTRODUODENOSCOPY WITH ANESTHESIA;  Surgeon: Hector Mooney MD;  Location: [facility] ENDOSCOPY;  Service:          Current Outpatient Prescriptions:   •  Apoaequorin (PREVAGEN PO), Take  by mouth Daily., Disp: , Rfl:   •  azaTHIOprine (IMURAN) 50 MG tablet, TK 1 T PO D, Disp: , Rfl: 6  •  Famotidine (PEPCID AC PO), Take  by mouth Every Night., Disp: , Rfl:   •  mesalamine (PENTASA) 500 MG CR capsule, Take 2 capsules by mouth 4 (Four) Times a Day., Disp: 120 capsule, Rfl: 11  •  Multiple Vitamins-Minerals (MULTIVITAMIN WITH MINERALS) tablet tablet, Take 1 tablet by mouth Daily., Disp: , Rfl:   •  ondansetron (ZOFRAN) 8 MG tablet, Take 1 tablet by mouth Every 8 (Eight) Hours As Needed for nausea or vomiting. (Patient taking differently: Take 8 mg by mouth As Needed for Nausea or Vomiting.), Disp: 60 tablet, Rfl: 0  •  prednisoLONE acetate (PRED FORTE) 1 % ophthalmic suspension, 1 drop 3 (Three) Times a Day., Disp: , Rfl:   •  ANDROGEL PUMP 20.25 MG/ACT (1.62%) gel, DAISY TWO PUMPS TO RIGHT ARM AND 1 PUMP TO LEFT ARM D, Disp: , Rfl: 2  •  MethylPREDNISolone (MEDROL, MIKE,) 4 MG tablet, Take as directed on package instructions., Disp: 21 tablet, Rfl: 0    No Known Allergies    Social History     Social History   • Marital status:      Spouse name: N/A   • Number of children: N/A   • Years of education: N/A     Occupational History   • Not on file.     Social History Main Topics   • Smoking status: Never Smoker   • Smokeless tobacco: Never Used   • Alcohol use 8.4 oz/week     14 Glasses of wine per week      Comment: daily   • Drug use: No   • Sexual activity: Not on file     Other Topics Concern   • Not on file     Social History Narrative       Family History   Problem Relation Age of Onset   • No Known Problems Father    • No Known Problems Mother    • Colon cancer Maternal Aunt    • Colon polyps Paternal Uncle        Review of Systems  General no fever chills or  sweats weight stable  Gastrointestinal: Not present-, constipation, diarrhea, dysphagia, hematemesis, melena, odynophagia, nausea, vomiting, pyrosis, regurgitation, hematochezia,    Objective     Vitals:    10/16/17 1546   BP: 118/82   Pulse: 70   Temp: 96.8 °F (36 °C)   SpO2: 99%       Physical Exam  No acute distress. Vital signs as documented. Skin warm and dry and without overt rashes. EOMI, sclera anicteric.  Neck without JVD or masses. Lungs clear to auscultation bilaterally, no rales. Heart exam notable for regular rhythm, normal sounds and absence of loud murmurs, rubs or gallops. Abdomen is soft, nontender, non distended, normal bowel sounds and without evidence of organomegaly, masses, or abdominal aortic enlargement. Extremities nonedematous, no cyanosis. Neuro alert, moves extremities.    No visits with results within 2 Week(s) from this visit.  Latest known visit with results is:    Office Visit on 09/12/2017   Component Date Value Ref Range Status   • Color 09/12/2017 Yellow  Yellow, Straw, Dark Yellow, Lyla Final   • Clarity, UA 09/12/2017 Clear  Clear Final   • Glucose, UA 09/12/2017 Negative  Negative, 1000 mg/dL (3+) mg/dL Final   • Bilirubin 09/12/2017 Negative  Negative Final   • Ketones, UA 09/12/2017 Negative  Negative Final   • Specific Gravity  09/12/2017 1.020  1.005 - 1.030 Final   • Blood, UA 09/12/2017 Negative  Negative Final   • pH, Urine 09/12/2017 7.0  5.0 - 8.0 Final   • Protein, POC 09/12/2017 Negative  Negative mg/dL Final   • Urobilinogen, UA 09/12/2017 Normal  Normal Final   • Leukocytes 09/12/2017 Negative  Negative Final   • Nitrite, UA 09/12/2017 Negative  Negative Final       No Images in the past 120 days found..      Assessment/Plan      Problem List Items Addressed This Visit        Digestive    Crohn's disease of small intestine without complication - Primary    Relevant Orders    CBC & Differential    Comprehensive Metabolic Panel    C-reactive Protein    Crohn's  disease of small intestine with intestinal obstruction             First, regarding his Crohn's disease asymptomatic.  I did stress the importance of him having his labs checked routinely.  We talked about immunosuppression with taking Imuran.  We talked about the side effects.  He expressed understanding agrees to go get his labs.    I did discuss with him that if he does have prostate cancer of which currently is only undergone evaluation, then he will need to discuss with his physicians treating the prostate cancer whether it would be reasonable for him to continue with Imuran therapy.  We discussed how the immune system and can monitor the body for cancer.  I cannot clearly state whether being on Imuran would make you more susceptible to the spread of prostate cancer but it is something that may need to be addressed if he is found to have prostate cancer whether he should continue with this or not.  We would deathly have to weigh the risks versus the benefits.  So far the benefits have been promising as he is now asymptomatic on Imuran.  He is on a very low dose.  I would not recommend substituting another immunosuppressant agent in lieu of the Imuran because of an elevated PSA.  There is no obvious benefit.  The patient expressed understanding and will discuss this with his urologist if anything becomes of the PSA elevation.    I did ask him to try the Prevacid twice a day when he has his epigastric discomfort see if that relieves it.  We does get worse to let us know.    I will see him back in 4 months for follow-up evaluation sooner if needed.  He will go get his labs today.  If these are unremarkable then he will need repeat labs in 3 months approximately as discussed  Continue ongoing management by primary care provider and other specialists.     EMR Dragon/transcription disclaimer:  Much of this encounter note is electronic transcription/translation of spoken language to printed text.  The electronic  translation of spoken language may be erroneous, or at times, nonsensical words or phrases may be inadvertently transcribed.  Although I have reviewed the note for such errors, some may still exist.    Hector Mooney MD  4:37 PM  10/16/17

## 2017-10-18 LAB — PSA SERPL-MCNC: 13 NG/ML (ref 0–4)

## 2017-11-07 ENCOUNTER — OFFICE VISIT (OUTPATIENT)
Dept: UROLOGY | Facility: CLINIC | Age: 67
End: 2017-11-07

## 2017-11-07 VITALS
BODY MASS INDEX: 22.21 KG/M2 | SYSTOLIC BLOOD PRESSURE: 112 MMHG | TEMPERATURE: 98.1 F | DIASTOLIC BLOOD PRESSURE: 70 MMHG | HEIGHT: 73 IN | WEIGHT: 167.6 LBS

## 2017-11-07 DIAGNOSIS — R97.20 ELEVATED PROSTATE SPECIFIC ANTIGEN (PSA): ICD-10-CM

## 2017-11-07 DIAGNOSIS — E29.1 HYPOGONADISM, MALE: ICD-10-CM

## 2017-11-07 DIAGNOSIS — N40.0 ENLARGED PROSTATE: Primary | ICD-10-CM

## 2017-11-07 LAB
BILIRUB BLD-MCNC: NEGATIVE MG/DL
CLARITY, POC: CLEAR
COLOR UR: YELLOW
GLUCOSE UR STRIP-MCNC: NEGATIVE MG/DL
KETONES UR QL: NEGATIVE
LEUKOCYTE EST, POC: NEGATIVE
NITRITE UR-MCNC: NEGATIVE MG/ML
PH UR: 6 [PH] (ref 5–8)
PROT UR STRIP-MCNC: NEGATIVE MG/DL
RBC # UR STRIP: NEGATIVE /UL
SP GR UR: 1.02 (ref 1–1.03)
UROBILINOGEN UR QL: NORMAL

## 2017-11-07 PROCEDURE — 81003 URINALYSIS AUTO W/O SCOPE: CPT | Performed by: UROLOGY

## 2017-11-07 PROCEDURE — 99214 OFFICE O/P EST MOD 30 MIN: CPT | Performed by: UROLOGY

## 2017-11-07 NOTE — PROGRESS NOTES
Subjective    Mr. Ring is 67 y.o. male    CHIEF COMPLAINT: Elevated PSA    Patient comes back today for follow-up of elevated PSA he has gone to [place] we have gotten an MRI on the patient which by report is normal without evidence of any suspicious areas.    I did discuss these findings with him I did indicate that this does not exclude prostate cancer hopefully the MRI will  any significant prostate cancer and I clearly discussed what that meant as far as a Nico's grade and longevity etc.    The decision now obviously is whether to do a biopsy or just observation.    He had such a difficult time with the biopsy previously he is very reluctant to have another biopsy he is also on Imuran and prednisone so again I told him I would certainly want to talk to his physicians about this but I think he probably would be at somewhat increased risk for infection.    My only concern is his PSA is jumped up relatively rapidly which in my experience is not common for someone with an indolent Nico's grade 6 prostate cancer.    He wondered if Proscar or Avodart might be beneficial but I told him that I yes the PSA will go down but then we mentally have to double it to get to the correct PSA says really not a treatment for this were not treating the PSA were treating or hoping to find the underlying etiology of this    He has moderate symptoms of BPH no gross hematuria no flank pain no change in his symptoms he is not taking anything for that at this time      History of Present Illness      The following portions of the patient's history were reviewed and updated as appropriate: allergies, current medications, past family history, past medical history, past social history, past surgical history and problem list.    Review of Systems   Constitutional: Negative for chills and fever.   Gastrointestinal: Negative for abdominal pain, anal bleeding and blood in stool.   Genitourinary: Positive for frequency.  Negative for difficulty urinating, flank pain, hematuria and urgency.         Current Outpatient Prescriptions:   •  ANDROGEL PUMP 20.25 MG/ACT (1.62%) gel, DAISY TWO PUMPS TO RIGHT ARM AND 1 PUMP TO LEFT ARM D, Disp: , Rfl: 2  •  Apoaequorin (PREVAGEN PO), Take  by mouth Daily., Disp: , Rfl:   •  azaTHIOprine (IMURAN) 50 MG tablet, TK 1 T PO D, Disp: , Rfl: 6  •  Famotidine (PEPCID AC PO), Take  by mouth Every Night., Disp: , Rfl:   •  mesalamine (PENTASA) 500 MG CR capsule, Take 2 capsules by mouth 4 (Four) Times a Day., Disp: 120 capsule, Rfl: 11  •  MethylPREDNISolone (MEDROL, MIKE,) 4 MG tablet, Take as directed on package instructions., Disp: 21 tablet, Rfl: 0  •  Multiple Vitamins-Minerals (MULTIVITAMIN WITH MINERALS) tablet tablet, Take 1 tablet by mouth Daily., Disp: , Rfl:   •  ondansetron (ZOFRAN) 8 MG tablet, Take 1 tablet by mouth Every 8 (Eight) Hours As Needed for nausea or vomiting. (Patient taking differently: Take 8 mg by mouth As Needed for Nausea or Vomiting.), Disp: 60 tablet, Rfl: 0  •  prednisoLONE acetate (PRED FORTE) 1 % ophthalmic suspension, 1 drop 3 (Three) Times a Day., Disp: , Rfl:     Past Medical History:   Diagnosis Date   • Crohn's disease    • Elevated PSA    • GERD (gastroesophageal reflux disease)        Past Surgical History:   Procedure Laterality Date   • CAPSULE ENDOSCOPY N/A 10/19/2016    Procedure: CAPSULE ENDOSCOPY AGILE;  Surgeon: Hector Mooney MD;  Location: [facility] ENDOSCOPY;  Service:    • CHOLECYSTECTOMY     • CHOLECYSTECTOMY     • COLONOSCOPY N/A 10/19/2016    Procedure: COLONOSCOPY WITH ANESTHESIA;  Surgeon: Hector Mooney MD;  Location: [facility] ENDOSCOPY;  Service:    • ENDOSCOPY N/A 10/19/2016    Procedure: ESOPHAGOGASTRODUODENOSCOPY WITH ANESTHESIA;  Surgeon: Hector Mooney MD;  Location: [facility] ENDOSCOPY;  Service:        Social History     Social History   • Marital status:      Spouse name: N/A   • Number of children: N/A   • Years of education:  "N/A     Social History Main Topics   • Smoking status: Never Smoker   • Smokeless tobacco: Never Used   • Alcohol use 8.4 oz/week     14 Glasses of wine per week      Comment: daily   • Drug use: No   • Sexual activity: Not Asked     Other Topics Concern   • None     Social History Narrative       Family History   Problem Relation Age of Onset   • No Known Problems Father    • No Known Problems Mother    • Colon cancer Maternal Aunt    • Colon polyps Paternal Uncle        Objective    /70  Temp 98.1 °F (36.7 °C)  Ht 73\" (185.4 cm)  Wt 167 lb 9.6 oz (76 kg)  BMI 22.11 kg/m2    Physical Exam      Office Visit on 10/16/2017   Component Date Value Ref Range Status   • Glucose 10/16/2017 94  70 - 100 mg/dL Final   • BUN 10/16/2017 11  5 - 21 mg/dL Final   • Creatinine 10/16/2017 0.82  0.50 - 1.40 mg/dL Final   • Sodium 10/16/2017 139  135 - 145 mmol/L Final   • Potassium 10/16/2017 4.2  3.5 - 5.3 mmol/L Final   • Chloride 10/16/2017 104  98 - 110 mmol/L Final   • CO2 10/16/2017 27.0  24.0 - 31.0 mmol/L Final   • Calcium 10/16/2017 9.1  8.4 - 10.4 mg/dL Final   • Total Protein 10/16/2017 6.5  6.3 - 8.7 g/dL Final   • Albumin 10/16/2017 4.00  3.50 - 5.00 g/dL Final   • ALT (SGPT) 10/16/2017 37  0 - 54 U/L Final   • AST (SGOT) 10/16/2017 21  7 - 45 U/L Final   • Alkaline Phosphatase 10/16/2017 114  24 - 120 U/L Final   • Total Bilirubin 10/16/2017 0.4  0.1 - 1.0 mg/dL Final   • eGFR Non African Amer 10/16/2017 94  >60 mL/min/1.73 Final   • Globulin 10/16/2017 2.5  gm/dL Final   • A/G Ratio 10/16/2017 1.6  1.1 - 2.5 g/dL Final   • BUN/Creatinine Ratio 10/16/2017 13.4  7.0 - 25.0 Final   • Anion Gap 10/16/2017 8.0  4.0 - 13.0 mmol/L Final   • C-Reactive Protein 10/16/2017 <0.50  0.00 - 0.99 mg/dL Final   • WBC 10/16/2017 6.60  4.80 - 10.80 10*3/mm3 Final   • RBC 10/16/2017 4.66* 4.80 - 5.90 10*6/mm3 Final   • Hemoglobin 10/16/2017 14.3  14.0 - 18.0 g/dL Final   • Hematocrit 10/16/2017 42.0  40.0 - 52.0 % Final   • " MCV 10/16/2017 90.1  82.0 - 95.0 fL Final   • MCH 10/16/2017 30.7  28.0 - 32.0 pg Final   • MCHC 10/16/2017 34.0  33.0 - 36.0 g/dL Final   • RDW 10/16/2017 13.0  12.0 - 15.0 % Final   • RDW-SD 10/16/2017 41.9  40.0 - 54.0 fl Final   • MPV 10/16/2017 10.5  6.0 - 12.0 fL Final   • Platelets 10/16/2017 192  130 - 400 10*3/mm3 Final   • Neutrophil % 10/16/2017 75.3  39.0 - 78.0 % Final   • Lymphocyte % 10/16/2017 17.7  15.0 - 45.0 % Final   • Monocyte % 10/16/2017 5.3  4.0 - 12.0 % Final   • Eosinophil % 10/16/2017 1.1  0.0 - 4.0 % Final   • Basophil % 10/16/2017 0.3  0.0 - 2.0 % Final   • Immature Grans % 10/16/2017 0.3  0.0 - 5.0 % Final   • Neutrophils, Absolute 10/16/2017 4.97  1.87 - 8.40 10*3/mm3 Final   • Lymphocytes, Absolute 10/16/2017 1.17  0.72 - 4.86 10*3/mm3 Final   • Monocytes, Absolute 10/16/2017 0.35  0.19 - 1.30 10*3/mm3 Final   • Eosinophils, Absolute 10/16/2017 0.07  0.00 - 0.70 10*3/mm3 Final   • Basophils, Absolute 10/16/2017 0.02  0.00 - 0.20 10*3/mm3 Final   • Immature Grans, Absolute 10/16/2017 0.02  0.00 - 0.03 10*3/mm3 Final       Results for orders placed or performed in visit on 11/07/17   POC Urinalysis Dipstick, Automated   Result Value Ref Range    Color Yellow Yellow, Straw, Dark Yellow, Lyla    Clarity, UA Clear Clear    Glucose, UA Negative Negative, 1000 mg/dL (3+) mg/dL    Bilirubin Negative Negative    Ketones, UA Negative Negative    Specific Gravity  1.025 1.005 - 1.030    Blood, UA Negative Negative    pH, Urine 6.0 5.0 - 8.0    Protein, POC Negative Negative mg/dL    Urobilinogen, UA Normal Normal    Leukocytes Negative Negative    Nitrite, UA Negative Negative     Microscopic Urinalysis  I inspected the urine myself based on the clinical situation including the dipstick urine. The urine is spun in a centrifuge for three minutes. The spun urine shows 3-6 rbc/hpf, none wbc/hpf, 0-2 epi/hpf, negative bacteria, negative crystals, and negative casts.     Assessment and  Plan    Diagnoses and all orders for this visit:    Enlarged prostate  -     POC Urinalysis Dipstick, Automated    Hypogonadism, male    Elevated prostate specific antigen (PSA)  -     PSA; Future    Plan--I think after thorough discussion of all the options as noted above regarding prostate biopsy versus observation he would like to continue just observation at this time I think realizing my concern that there may be an underlying prostate cancer present.    What we have agreed upon is we will seen back here in 3 months with a PSA and he will call sooner when necessary.    He also has taken testosterone the past but is been off that now waiting final decision as to what to do with the PSA and I certainly agree to him that I would not take any testosterone at this time

## 2017-11-12 ENCOUNTER — RESULTS ENCOUNTER (OUTPATIENT)
Dept: UROLOGY | Facility: CLINIC | Age: 67
End: 2017-11-12

## 2017-11-12 DIAGNOSIS — R97.20 ELEVATED PROSTATE SPECIFIC ANTIGEN (PSA): ICD-10-CM

## 2017-11-20 ENCOUNTER — LAB (OUTPATIENT)
Dept: LAB | Facility: HOSPITAL | Age: 67
End: 2017-11-20

## 2017-11-20 ENCOUNTER — HOSPITAL ENCOUNTER (OUTPATIENT)
Dept: CT IMAGING | Facility: HOSPITAL | Age: 67
Discharge: HOME | End: 2017-11-20
Admitting: NURSE PRACTITIONER

## 2017-11-20 ENCOUNTER — TRANSCRIBE ORDERS (OUTPATIENT)
Dept: ADMINISTRATIVE | Facility: HOSPITAL | Age: 67
End: 2017-11-20

## 2017-11-20 ENCOUNTER — HOSPITAL ENCOUNTER (OUTPATIENT)
Dept: ULTRASOUND IMAGING | Facility: HOSPITAL | Age: 67
Discharge: HOME | End: 2017-11-20

## 2017-11-20 DIAGNOSIS — R41.9 UNSPECIFIED SYMPTOMS AND SIGNS INVOLVING COGNITIVE FUNCTIONS AND AWARENESS: Primary | ICD-10-CM

## 2017-11-20 DIAGNOSIS — R41.9 UNSPECIFIED SYMPTOMS AND SIGNS INVOLVING COGNITIVE FUNCTIONS AND AWARENESS: ICD-10-CM

## 2017-11-20 DIAGNOSIS — I65.23 OBSTRUCTION OF CAROTID ARTERY ON BOTH SIDES: ICD-10-CM

## 2017-11-20 LAB
25(OH)D3 SERPL-MCNC: 54.9 NG/ML (ref 30–100)
ALBUMIN SERPL-MCNC: 3.9 G/DL (ref 3.5–5)
ALBUMIN/GLOB SERPL: 1.3 G/DL (ref 1.1–2.5)
ALP SERPL-CCNC: 93 U/L (ref 24–120)
ALT SERPL W P-5'-P-CCNC: 31 U/L (ref 0–54)
ANION GAP SERPL CALCULATED.3IONS-SCNC: 7 MMOL/L (ref 4–13)
AST SERPL-CCNC: 17 U/L (ref 7–45)
AUTO MIXED CELLS #: 0.4 10*3/MM3 (ref 0.1–2.6)
AUTO MIXED CELLS %: 8.6 % (ref 0.1–24)
BILIRUB SERPL-MCNC: 0.5 MG/DL (ref 0.1–1)
BILIRUB UR QL STRIP: NEGATIVE
BUN BLD-MCNC: 14 MG/DL (ref 5–21)
BUN/CREAT SERPL: 15.6
CALCIUM SPEC-SCNC: 9 MG/DL (ref 8.4–10.4)
CHLORIDE SERPL-SCNC: 102 MMOL/L (ref 98–110)
CLARITY UR: CLEAR
CO2 SERPL-SCNC: 32 MMOL/L (ref 24–31)
COLOR UR: YELLOW
CREAT BLD-MCNC: 0.9 MG/DL (ref 0.5–1.4)
ERYTHROCYTE [DISTWIDTH] IN BLOOD BY AUTOMATED COUNT: 14 % (ref 12–15)
GFR SERPL CREATININE-BSD FRML MDRD: 85 ML/MIN/1.73
GLOBULIN UR ELPH-MCNC: 2.9 GM/DL
GLUCOSE BLD-MCNC: 66 MG/DL (ref 70–100)
GLUCOSE UR STRIP-MCNC: NEGATIVE MG/DL
HCT VFR BLD AUTO: 41.4 % (ref 40–52)
HGB BLD-MCNC: 14.2 G/DL (ref 14–18)
HGB UR QL STRIP.AUTO: NEGATIVE
KETONES UR QL STRIP: ABNORMAL
LEUKOCYTE ESTERASE UR QL STRIP.AUTO: NEGATIVE
LYMPHOCYTES # BLD AUTO: 0.8 10*3/MM3 (ref 0.8–7)
LYMPHOCYTES NFR BLD AUTO: 18 % (ref 15–45)
MCH RBC QN AUTO: 31.8 PG (ref 28–32)
MCHC RBC AUTO-ENTMCNC: 34.3 G/DL (ref 33–36)
MCV RBC AUTO: 92.8 FL (ref 82–95)
NEUTROPHILS # BLD AUTO: 3.4 10*3/MM3 (ref 1.5–8.3)
NEUTROPHILS NFR BLD AUTO: 73.4 % (ref 39–78)
NITRITE UR QL STRIP: NEGATIVE
PH UR STRIP.AUTO: 7 [PH] (ref 5–8)
PLATELET # BLD AUTO: 208 10*3/MM3 (ref 130–400)
PMV BLD AUTO: 8.6 FL (ref 6–12)
POTASSIUM BLD-SCNC: 4.1 MMOL/L (ref 3.5–5.3)
PROT SERPL-MCNC: 6.8 G/DL (ref 6.3–8.7)
PROT UR QL STRIP: NEGATIVE
RBC # BLD AUTO: 4.46 10*6/MM3 (ref 4.2–5.4)
SODIUM BLD-SCNC: 141 MMOL/L (ref 135–145)
SP GR UR STRIP: 1.02 (ref 1–1.03)
TSH SERPL DL<=0.05 MIU/L-ACNC: 0.84 MIU/ML (ref 0.47–4.68)
UROBILINOGEN UR QL STRIP: ABNORMAL
VIT B12 BLD-MCNC: 493 PG/ML (ref 239–931)
WBC NRBC COR # BLD: 4.6 10*3/MM3 (ref 4.8–10.8)

## 2017-11-20 PROCEDURE — 82607 VITAMIN B-12: CPT | Performed by: NURSE PRACTITIONER

## 2017-11-20 PROCEDURE — 70450 CT HEAD/BRAIN W/O DYE: CPT

## 2017-11-20 PROCEDURE — 82306 VITAMIN D 25 HYDROXY: CPT | Performed by: NURSE PRACTITIONER

## 2017-11-20 PROCEDURE — 84443 ASSAY THYROID STIM HORMONE: CPT | Performed by: NURSE PRACTITIONER

## 2017-11-20 PROCEDURE — 84436 ASSAY OF TOTAL THYROXINE: CPT | Performed by: NURSE PRACTITIONER

## 2017-11-20 PROCEDURE — 93880 EXTRACRANIAL BILAT STUDY: CPT

## 2017-11-20 PROCEDURE — 80053 COMPREHEN METABOLIC PANEL: CPT

## 2017-11-20 PROCEDURE — 81003 URINALYSIS AUTO W/O SCOPE: CPT

## 2017-11-20 PROCEDURE — 85025 COMPLETE CBC W/AUTO DIFF WBC: CPT

## 2017-11-20 PROCEDURE — 36415 COLL VENOUS BLD VENIPUNCTURE: CPT

## 2017-11-21 LAB — T4 SERPL-MCNC: 6.5 UG/DL (ref 4.5–12)

## 2017-12-14 ENCOUNTER — OFFICE VISIT (OUTPATIENT)
Dept: OTOLARYNGOLOGY | Facility: CLINIC | Age: 67
End: 2017-12-14

## 2017-12-14 VITALS
WEIGHT: 164 LBS | BODY MASS INDEX: 22.21 KG/M2 | DIASTOLIC BLOOD PRESSURE: 80 MMHG | HEART RATE: 80 BPM | TEMPERATURE: 98.9 F | HEIGHT: 72 IN | SYSTOLIC BLOOD PRESSURE: 120 MMHG

## 2017-12-14 DIAGNOSIS — J34.1: ICD-10-CM

## 2017-12-14 DIAGNOSIS — J31.0 CHRONIC RHINITIS, UNSPECIFIED TYPE: Primary | ICD-10-CM

## 2017-12-14 PROCEDURE — 99203 OFFICE O/P NEW LOW 30 MIN: CPT | Performed by: OTOLARYNGOLOGY

## 2017-12-14 RX ORDER — FLUTICASONE PROPIONATE 50 MCG
2 SPRAY, SUSPENSION (ML) NASAL DAILY
Qty: 1 BOTTLE | Refills: 6 | Status: SHIPPED | OUTPATIENT
Start: 2017-12-14 | End: 2018-01-13

## 2017-12-14 NOTE — PROGRESS NOTES
"YOB: 1950  Location: [facility]  Location Address: [address] [place] [address], [facility] [address], [address] [place] [address]  Location Phone: [phone]    Chief Complaint   Patient presents with   • Sinus Problem     cyst on maxillary sinus       History of Present Illness  Vinicio Ring is a 67 y.o. male.  Vinicio Ring is here for evaluation of ENT complaints. The patient has had problems with cyst of sinus found on CT of the head. The symptoms are not localized to a particular location. The patient has had mild symptoms. The symptoms have been present for the last several weeks The symptoms are aggravated by  no identifiable factors. There have been no factors that have improved the symptoms. Patient has had recent cold. He states he has mild nasal congestion, nasal drainage and postnasal drainage. He states since he was a child he has had frequent throat clearing. Patient denies sore throat, cough, headaches and allergies. Patient has had CT of the head.    Study Result   EXAMINATION: CT HEAD WO CONTRAST-       2017 11:50 AM CST      HISTORY: r41.9; R41.9-Unspecified symptoms and signs involving cognitive  functions and awareness      In order to have a CT radiation dose as low as reasonably achievable  Automated Exposure Control was utilized for adjustment of the mA and/or  KV according to patient size.      DLP in mGycm= 682.      Axial, sagittal, and coronal noncontrast CT imaging of the head.      2 cm left maxillary sinus \"cyst\". This has a slightly expansile  appearance which favors a mucocele.  The visualized portions of the paranasal sinuses are otherwise clear.      The brain and ventricles have an age appropriate appearance.   There is no hemorrhage or mass-effect.   No acute infarction is seen.      No calvarial abnormality.      IMPRESSION:  1. No acute intracranial abnormality is seen.  2. Left maxillary sinus 2 cm cyst or " mucocele.                                                      This report was finalized on 11/20/2017 15:40 by Dr. Carlos Billy MD.          Past Medical History:   Diagnosis Date   • Crohn's disease    • Elevated PSA    • GERD (gastroesophageal reflux disease)        Past Surgical History:   Procedure Laterality Date   • CAPSULE ENDOSCOPY N/A 10/19/2016    Procedure: CAPSULE ENDOSCOPY AGILE;  Surgeon: Hector Mooney MD;  Location:  [facility] ENDOSCOPY;  Service:    • CHOLECYSTECTOMY     • CHOLECYSTECTOMY     • COLONOSCOPY N/A 10/19/2016    Procedure: COLONOSCOPY WITH ANESTHESIA;  Surgeon: Hector Mooney MD;  Location:  [facility] ENDOSCOPY;  Service:    • ENDOSCOPY N/A 10/19/2016    Procedure: ESOPHAGOGASTRODUODENOSCOPY WITH ANESTHESIA;  Surgeon: Hector Mooney MD;  Location:  [facility] ENDOSCOPY;  Service:          Current Outpatient Prescriptions:   •  ANDROGEL PUMP 20.25 MG/ACT (1.62%) gel, DAISY TWO PUMPS TO RIGHT ARM AND 1 PUMP TO LEFT ARM D, Disp: , Rfl: 2  •  Apoaequorin (PREVAGEN PO), Take  by mouth Daily., Disp: , Rfl:   •  azaTHIOprine (IMURAN) 50 MG tablet, TK 1 T PO D, Disp: , Rfl: 6  •  Famotidine (PEPCID AC PO), Take  by mouth Every Night., Disp: , Rfl:   •  mesalamine (PENTASA) 500 MG CR capsule, Take 2 capsules by mouth 4 (Four) Times a Day., Disp: 120 capsule, Rfl: 11  •  MethylPREDNISolone (MEDROL, MIKE,) 4 MG tablet, Take as directed on package instructions., Disp: 21 tablet, Rfl: 0  •  Multiple Vitamins-Minerals (MULTIVITAMIN WITH MINERALS) tablet tablet, Take 1 tablet by mouth Daily., Disp: , Rfl:   •  ondansetron (ZOFRAN) 8 MG tablet, Take 1 tablet by mouth Every 8 (Eight) Hours As Needed for nausea or vomiting. (Patient taking differently: Take 8 mg by mouth As Needed for Nausea or Vomiting.), Disp: 60 tablet, Rfl: 0  •  prednisoLONE acetate (PRED FORTE) 1 % ophthalmic suspension, 1 drop 3 (Three) Times a Day., Disp: , Rfl:     Review of patient's allergies indicates no known allergies.    Family  History   Problem Relation Age of Onset   • No Known Problems Father    • No Known Problems Mother    • Colon cancer Maternal Aunt    • Colon polyps Paternal Uncle        Social History     Social History   • Marital status:      Spouse name: N/A   • Number of children: N/A   • Years of education: N/A     Occupational History   • Not on file.     Social History Main Topics   • Smoking status: Never Smoker   • Smokeless tobacco: Never Used   • Alcohol use 8.4 oz/week     14 Glasses of wine per week      Comment: daily   • Drug use: No   • Sexual activity: Not on file     Other Topics Concern   • Not on file     Social History Narrative       Review of Systems   Constitutional: Negative.    HENT: Positive for congestion, postnasal drip and rhinorrhea.         Globus sensation   Eyes: Negative.    Respiratory: Negative.    Cardiovascular: Negative.    Gastrointestinal: Negative.    Endocrine: Negative.    Genitourinary: Negative.    Musculoskeletal: Negative.    Skin: Negative.    Allergic/Immunologic: Negative.    Neurological: Negative.    Hematological: Negative.    Psychiatric/Behavioral: Negative.        Vitals:    12/14/17 1451   BP: 120/80   Pulse: 80   Temp: 98.9 °F (37.2 °C)       Objective     Physical Exam  CONSTITUTIONAL: well nourished, alert, oriented, in no acute distress     COMMUNICATION AND VOICE: able to communicate normally, normal voice quality    HEAD: normocephalic, no lesions, atraumatic, no tenderness, no masses     FACE: appearance normal, no lesions, no tenderness, no deformities, facial motion symmetric    SALIVARY GLANDS: parotid glands with no tenderness, no swelling, no masses, submandibular glands with normal size, nontender    EYES: ocular motility normal, eyelids normal, orbits normal, no proptosis, conjunctiva normal , pupils equal, round     EARS:  Hearing: response to conversational voice normal bilaterally   External Ears: auricles without lesions  Otoscopic: tympanic  membrane appearance normal, no lesions, no perforation, normal mobility, no fluid    NOSE:  External Nose: structure normal, no tenderness on palpation, no nasal discharge, no lesions, no evidence of trauma, nostrils patent   Intranasal Exam: nasal mucosa normal, vestibule within normal limits, inferior turbinate normal, nasal septum midline   Nasopharynx:   Bilateral diagnostic rigid nasal endoscopy: Midline septum with moderate inflammation left slightly greater than the right but no purulence middle meati appear normal without polypoid changes mild edema right worse than left    ORAL:  Lips: upper and lower lips without lesion   Teeth: dentition within normal limits for age   Gums: gingivae healthy   Oral Mucosa: oral mucosa normal, no mucosal lesions   Floor of Mouth: Warthin’s duct patent, mucosa normal  Tongue: lingual mucosa normal without lesions, normal tongue mobility   Palate: soft and hard palates with normal mucosa and structure  Oropharynx: oropharyngeal mucosa normal    HYPOPHARYNX:   LARYNX:   NECK: neck appearance normal, no mass,  noted without erythema or tenderness    THYROID: no overt thyromegaly, no tenderness, nodules or mass present on palpation, position midline     LYMPH NODES: no lymphadenopathy    CHEST/RESPIRATORY: respiratory effort normal, normal breath sounds     CARDIOVASCULAR: rate and rhythm normal, extremities without cyanosis or edema      NEUROLOGIC/PSYCHIATRIC: oriented to time, place and person, mood normal, affect appropriate, CN II-XII intact grossly    Assessment/Plan   Vinicio was seen today for sinus problem.    Diagnoses and all orders for this visit:    Chronic rhinitis, unspecified type    Antral cyst  Comments:  left      * Surgery not found *  No orders of the defined types were placed in this encounter.    No Follow-up on file.       Patient Instructions   Fluticasone nasal spray  Call or return for problems  Follow-up in 6-8 weeks with CT scan paranasal sinuses  without contrast

## 2018-01-22 ENCOUNTER — LAB (OUTPATIENT)
Dept: LAB | Facility: HOSPITAL | Age: 68
End: 2018-01-22
Attending: UROLOGY

## 2018-01-22 DIAGNOSIS — R97.20 ELEVATED PROSTATE SPECIFIC ANTIGEN (PSA): Primary | ICD-10-CM

## 2018-01-22 LAB — PSA SERPL-MCNC: 5.04 NG/ML (ref 0–4)

## 2018-01-22 PROCEDURE — 36415 COLL VENOUS BLD VENIPUNCTURE: CPT

## 2018-01-22 PROCEDURE — 84153 ASSAY OF PSA TOTAL: CPT | Performed by: UROLOGY

## 2018-02-12 ENCOUNTER — OFFICE VISIT (OUTPATIENT)
Dept: GASTROENTEROLOGY | Facility: CLINIC | Age: 68
End: 2018-02-12

## 2018-02-12 VITALS
HEIGHT: 72 IN | WEIGHT: 171 LBS | HEART RATE: 85 BPM | OXYGEN SATURATION: 99 % | TEMPERATURE: 97.6 F | BODY MASS INDEX: 23.16 KG/M2 | SYSTOLIC BLOOD PRESSURE: 128 MMHG | DIASTOLIC BLOOD PRESSURE: 82 MMHG

## 2018-02-12 DIAGNOSIS — K50.00 CROHN'S DISEASE OF SMALL INTESTINE WITHOUT COMPLICATION (HCC): Primary | ICD-10-CM

## 2018-02-12 DIAGNOSIS — Z51.81 ENCOUNTER FOR MONITORING IMMUNOMODULATING THERAPY: ICD-10-CM

## 2018-02-12 DIAGNOSIS — Z79.899 ENCOUNTER FOR MONITORING IMMUNOMODULATING THERAPY: ICD-10-CM

## 2018-02-12 PROCEDURE — 99213 OFFICE O/P EST LOW 20 MIN: CPT | Performed by: INTERNAL MEDICINE

## 2018-02-12 RX ORDER — AZATHIOPRINE 50 MG/1
50 TABLET ORAL DAILY
Qty: 30 TABLET | Refills: 11 | Status: SHIPPED | OUTPATIENT
Start: 2018-02-12 | End: 2019-02-19 | Stop reason: SDUPTHER

## 2018-02-12 RX ORDER — MESALAMINE 500 MG/1
1000 CAPSULE, EXTENDED RELEASE ORAL 4 TIMES DAILY
Qty: 120 CAPSULE | Refills: 11 | Status: SHIPPED | OUTPATIENT
Start: 2018-02-12 | End: 2018-08-13 | Stop reason: SDUPTHER

## 2018-02-12 NOTE — PROGRESS NOTES
[facility] Gastroenterology    Chief Complaint   Patient presents with   • Crohn's Disease       Subjective     HPI    Vinicio Ring is a 67 y.o. male who presents with a chief complaint of  Crohn's.    It has been 4 months since I have seen him.  He states he has had 5 episodes of abdominal discomfort.  Nothing severe.  Typically the discomfort lasts 2-4 hours and manifest as cramps.  It was not bad enough to where he wanted to go to Hospital nor contact me for a course of steroids.  Otherwise everything is been going well.  The last episode of dull pain was over a month ago.  He has been gaining weight.  He denies diarrhea or constipation.  He has had no fever chills or sweats.  He did tell me squirt exercising as it is wondering if that was affected in his prostate.    In regards to his prostate, he continues to follow with Dr. Desir.  He tells me his PSA went from 13 back down to 5.  He is due to see Dr. Desir next week.  Did not have a repeat biopsy on the prostate and has a reminder he had negative MRI at [place] this past year.  Dr. Desir note was reviewed from the last office visit.       Past Medical History:   Diagnosis Date   • Antral cyst 12/14/2017   • Chronic rhinitis 12/14/2017   • Crohn's disease    • Elevated PSA    • GERD (gastroesophageal reflux disease)        Past Surgical History:   Procedure Laterality Date   • CAPSULE ENDOSCOPY N/A 10/19/2016    Procedure: CAPSULE ENDOSCOPY AGILE;  Surgeon: Hector Mooney MD;  Location: [facility] ENDOSCOPY;  Service:    • CHOLECYSTECTOMY     • CHOLECYSTECTOMY     • COLONOSCOPY N/A 10/19/2016    Procedure: COLONOSCOPY WITH ANESTHESIA;  Surgeon: Hector Mooney MD;  Location: [facility] ENDOSCOPY;  Service:    • ENDOSCOPY N/A 10/19/2016    Procedure: ESOPHAGOGASTRODUODENOSCOPY WITH ANESTHESIA;  Surgeon: Hector Mooney MD;  Location: [facility] ENDOSCOPY;  Service:          Current Outpatient Prescriptions:   •  azaTHIOprine (IMURAN) 50 MG tablet,  Take 1 tablet by mouth Daily., Disp: 30 tablet, Rfl: 11  •  CALCIUM PO, Take  by mouth Every Other Day As Needed., Disp: , Rfl:   •  Famotidine (PEPCID AC PO), Take  by mouth Every Night., Disp: , Rfl:   •  mesalamine (PENTASA) 500 MG CR capsule, Take 2 capsules by mouth 4 (Four) Times a Day., Disp: 120 capsule, Rfl: 11  •  ANDROGEL PUMP 20.25 MG/ACT (1.62%) gel, DAISY TWO PUMPS TO RIGHT ARM AND 1 PUMP TO LEFT ARM D, Disp: , Rfl: 2  •  ondansetron (ZOFRAN) 8 MG tablet, Take 1 tablet by mouth Every 8 (Eight) Hours As Needed for nausea or vomiting. (Patient taking differently: Take 8 mg by mouth As Needed for Nausea or Vomiting.), Disp: 60 tablet, Rfl: 0    No Known Allergies    Social History     Social History   • Marital status:      Spouse name: N/A   • Number of children: N/A   • Years of education: N/A     Occupational History   • Not on file.     Social History Main Topics   • Smoking status: Never Smoker   • Smokeless tobacco: Never Used   • Alcohol use 8.4 oz/week     14 Glasses of wine per week      Comment: daily   • Drug use: No   • Sexual activity: Not on file     Other Topics Concern   • Not on file     Social History Narrative       Family History   Problem Relation Age of Onset   • No Known Problems Father    • No Known Problems Mother    • Colon cancer Maternal Aunt    • Colon polyps Paternal Uncle        Review of Systems  General no fever chills or sweats weight stable  Gastrointestinal: Not present-abdominal pain, constipation, diarrhea, dysphagia, hematemesis, melena, odynophagia, nausea, vomiting, pyrosis, regurgitation, hematochezia,    Objective     Vitals:    02/12/18 1325   BP: 128/82   Pulse: 85   Temp: 97.6 °F (36.4 °C)   SpO2: 99%       Physical Exam  No acute distress. Vital signs as documented. Skin warm and dry and without overt rashes. EOMI, sclera anicteric.  Neck without JVD or masses. Lungs clear to auscultation bilaterally, no rales. Heart exam notable for regular rhythm,  normal sounds and absence of loud murmurs, rubs or gallops. Abdomen is soft, nontender, non distended, normal bowel sounds and without evidence of organomegaly, masses, or abdominal aortic enlargement. Extremities nonedematous, no cyanosis. Neuro alert, moves extremities.        Assessment/Plan      Problem List Items Addressed This Visit        Digestive    Crohn's disease of small intestine without complication - Primary    Overview     Diagnosed in 2016.  Symptoms manifest by intermittent intestinal obstruction.  Prometheus positive for IBD.         Relevant Orders    CBC & Differential    Comprehensive Metabolic Panel       Other    Encounter for monitoring immunomodulating therapy    Overview     TPMT enzyme levels are intermediate which can possibly increase risk of toxicity with Imuran at high doses.    T Spot negative June 2017  Patient has received the Pneumovax, as well as the shingles vaccine.  He did receive the flu shot for the 2017/18 season.  He sees dermatology, Dr. Taylor, routinely                  He is relatively stable.  We once again talked about the option of increasing the Imuran.  Prior to doing that I would check a metabolite level.  We talked about the risk of increasing Imuran including that of immunosuppression.  As before we discuss the diuretic possibility that Imuran by suppressing the immune system could have a role with how is body fights off prostate cancer if he does have prostate cancer.  It is a good sign that his PSA has dropped but he knows he needs to continue stay on top of this with urology.  I did review his last office visit note with urology He states his intestinal symptoms are not enough to add any extra therapy and increase the side effect profile this time.  I think that is reasonable.    He will be due for a T Spot in June.  He is due for a CBC and CMP.  I gave him the and he is going take that to his primary care office and have his labs done there.  I will see him  back in the office in 6 months.  Continue ongoing management by primary care provider and other specialists.     EMR Dragon/transcription disclaimer:  Much of this encounter note is electronic transcription/translation of spoken language to printed text.  The electronic translation of spoken language may be erroneous, or at times, nonsensical words or phrases may be inadvertently transcribed.  Although I have reviewed the note for such errors, some may still exist.    Hector Mooney MD  5:08 PM  02/12/18

## 2018-02-20 ENCOUNTER — OFFICE VISIT (OUTPATIENT)
Dept: UROLOGY | Facility: CLINIC | Age: 68
End: 2018-02-20

## 2018-02-20 VITALS — TEMPERATURE: 98 F | WEIGHT: 172 LBS | HEIGHT: 72 IN | BODY MASS INDEX: 23.3 KG/M2

## 2018-02-20 DIAGNOSIS — R97.20 ELEVATED PROSTATE SPECIFIC ANTIGEN (PSA): ICD-10-CM

## 2018-02-20 DIAGNOSIS — E29.1 HYPOGONADISM, MALE: ICD-10-CM

## 2018-02-20 DIAGNOSIS — N40.0 ENLARGED PROSTATE: Primary | ICD-10-CM

## 2018-02-20 DIAGNOSIS — N40.1 BENIGN PROSTATIC HYPERPLASIA WITH LOWER URINARY TRACT SYMPTOMS, SYMPTOM DETAILS UNSPECIFIED: ICD-10-CM

## 2018-02-20 PROCEDURE — 81001 URINALYSIS AUTO W/SCOPE: CPT | Performed by: UROLOGY

## 2018-02-20 PROCEDURE — 99213 OFFICE O/P EST LOW 20 MIN: CPT | Performed by: UROLOGY

## 2018-02-20 RX ORDER — ONDANSETRON HYDROCHLORIDE 8 MG/1
TABLET, FILM COATED ORAL EVERY 8 HOURS PRN
COMMUNITY
End: 2019-02-07 | Stop reason: SDUPTHER

## 2018-02-20 NOTE — PROGRESS NOTES
Subjective    Mr. Ring is 67 y.o. male    CHIEF COMPLAINT: Elevated PSA    The patient is back today for follow-up of elevated PSA actually has dropped rather precipitously from 13 down to 5.0 he has been off his testosterone; that may play a role but certainly that is although still elevated much less elevated for his age than the previous level.    Again he has had a negative MRI at [facility] the recent past.    From a BPH point review he is not having any change in his symptoms his AUA score today is 8 he does not take any medications for his prostate at this time he has no gross hematuria no flank pain no real burning stinging urgency etc. no history of urinary tract infections    He does have a history of hypogonadism but interestingly enough on his most recent testosterone is been off his testosterone now for some time his testosterone was 296 I told that slow but really just minimally low with 300 being low normal so I told him I probably would not recommend any repeat treatment of that  History of Present Illness      The following portions of the patient's history were reviewed and updated as appropriate: allergies, current medications, past family history, past medical history, past social history, past surgical history and problem list.    Review of Systems   Constitutional: Negative.  Negative for chills and fever.   Gastrointestinal: Negative for abdominal distention, abdominal pain, anal bleeding, blood in stool and nausea.   Genitourinary: Negative for difficulty urinating, dysuria, flank pain, frequency, hematuria and urgency.   Psychiatric/Behavioral: Negative.  Negative for agitation and confusion.         Current Outpatient Prescriptions:   •  ondansetron (ZOFRAN) 8 MG tablet, Take  by mouth Every 8 (Eight) Hours As Needed for Nausea or Vomiting., Disp: , Rfl:   •  ANDROGEL PUMP 20.25 MG/ACT (1.62%) gel, DAISY TWO PUMPS TO RIGHT ARM AND 1 PUMP TO LEFT ARM D, Disp: , Rfl: 2  •  azaTHIOprine  "(IMURAN) 50 MG tablet, Take 1 tablet by mouth Daily., Disp: 30 tablet, Rfl: 11  •  CALCIUM PO, Take  by mouth Every Other Day As Needed., Disp: , Rfl:   •  Famotidine (PEPCID AC PO), Take  by mouth Every Night., Disp: , Rfl:   •  mesalamine (PENTASA) 500 MG CR capsule, Take 2 capsules by mouth 4 (Four) Times a Day., Disp: 120 capsule, Rfl: 11    Past Medical History:   Diagnosis Date   • Antral cyst 12/14/2017   • Chronic rhinitis 12/14/2017   • Crohn's disease    • Elevated PSA    • GERD (gastroesophageal reflux disease)        Past Surgical History:   Procedure Laterality Date   • CAPSULE ENDOSCOPY N/A 10/19/2016    Procedure: CAPSULE ENDOSCOPY AGILE;  Surgeon: Hector Mooney MD;  Location:  [facility];  Service:    • CHOLECYSTECTOMY     • CHOLECYSTECTOMY     • COLONOSCOPY N/A 10/19/2016    Procedure: COLONOSCOPY WITH ANESTHESIA;  Surgeon: Hector Mooney MD;  Location:  [facility];  Service:    • ENDOSCOPY N/A 10/19/2016    Procedure: ESOPHAGOGASTRODUODENOSCOPY WITH ANESTHESIA;  Surgeon: Hector Mooney MD;  Location:  [facility];  Service:        Social History     Social History   • Marital status:      Spouse name: N/A   • Number of children: N/A   • Years of education: N/A     Social History Main Topics   • Smoking status: Never Smoker   • Smokeless tobacco: Never Used   • Alcohol use 8.4 oz/week     14 Glasses of wine per week      Comment: daily   • Drug use: No   • Sexual activity: Not Asked     Other Topics Concern   • None     Social History Narrative       Family History   Problem Relation Age of Onset   • No Known Problems Father    • No Known Problems Mother    • Colon cancer Maternal Aunt    • Colon polyps Paternal Uncle        Objective    Temp 98 °F (36.7 °C)  Ht 182.9 cm (72\")  Wt 78 kg (172 lb)  BMI 23.33 kg/m2    Physical Exam      Lab on 01/22/2018   Component Date Value Ref Range Status   • PSA 01/22/2018 5.040* 0.000 - 4.000 ng/mL Final       Results for orders " placed or performed in visit on 02/20/18   POC Urinalysis Dipstick, Automated   Result Value Ref Range    Color Yellow Yellow, Straw, Dark Yellow, Lyla    Clarity, UA Clear Clear    Glucose, UA Negative Negative, 1000 mg/dL (3+) mg/dL    Bilirubin Negative Negative    Ketones, UA Negative Negative    Specific Gravity  1.020 1.005 - 1.030    Blood, UA Negative Negative    pH, Urine 7.0 5.0 - 8.0    Protein, POC Negative Negative mg/dL    Urobilinogen, UA Normal Normal    Leukocytes Negative Negative    Nitrite, UA Negative Negative       Assessment and Plan    Diagnoses and all orders for this visit:    Enlarged prostate  -     POC Urinalysis Dipstick, Automated    Elevated prostate specific antigen (PSA)  -     PSA, Total & Free; Future    Benign prostatic hyperplasia with lower urinary tract symptoms, symptom details unspecified    Hypogonadism, male    Plan--we once again extensively discussed with her we need to do another biopsy again he did have a bad experience on his first normal many years ago and I among obviously more comfortable watching him since his PSA has dropped although I cannot give him a good reason why jumped up collected.    Also obviously his MRA was negative although again I think both of us relies this is not conclusive and he still may have underlying prostate cancer.    After thorough discussed the options I think were both comfortable watching this will seen back again in 6 months time with a repeat PSA free and total    From a hypogonadism point review again I suggested he stay off testosterone since is basically is low normal

## 2018-02-25 ENCOUNTER — RESULTS ENCOUNTER (OUTPATIENT)
Dept: UROLOGY | Facility: CLINIC | Age: 68
End: 2018-02-25

## 2018-02-25 DIAGNOSIS — R97.20 ELEVATED PROSTATE SPECIFIC ANTIGEN (PSA): ICD-10-CM

## 2018-02-27 ENCOUNTER — OFFICE VISIT (OUTPATIENT)
Dept: OTOLARYNGOLOGY | Facility: CLINIC | Age: 68
End: 2018-02-27

## 2018-02-27 VITALS
BODY MASS INDEX: 23.57 KG/M2 | DIASTOLIC BLOOD PRESSURE: 86 MMHG | HEART RATE: 80 BPM | SYSTOLIC BLOOD PRESSURE: 130 MMHG | WEIGHT: 174 LBS | TEMPERATURE: 98.7 F | HEIGHT: 72 IN

## 2018-02-27 DIAGNOSIS — J32.0 CHRONIC MAXILLARY SINUSITIS: Primary | ICD-10-CM

## 2018-02-27 DIAGNOSIS — J33.0 NASAL POLYP, BENIGN: ICD-10-CM

## 2018-02-27 PROCEDURE — 99214 OFFICE O/P EST MOD 30 MIN: CPT | Performed by: OTOLARYNGOLOGY

## 2018-02-27 PROCEDURE — 70486 CT MAXILLOFACIAL W/O DYE: CPT | Performed by: OTOLARYNGOLOGY

## 2018-02-27 PROCEDURE — 31231 NASAL ENDOSCOPY DX: CPT | Performed by: OTOLARYNGOLOGY

## 2018-02-27 NOTE — PATIENT INSTRUCTIONS
Natural history and pathophysiology of mucous retention cyst and polyps were discussed    Recommendation for follow-up was stressed  Call or return for problems particularly any sinus pressure or drainage congestion or other difficulties  Continue intranasal steroid sprays    Follow-up in 6 months with repeat CT scan

## 2018-02-27 NOTE — PROGRESS NOTES
"YOB: 1950  Location: [facility]  Location Address: [address] [place] [address], [facility] [address], [address] [place] [address]  Location Phone: [phone]    Chief Complaint   Patient presents with   • Follow-up     sinus       History of Present Illness  Vinicio Ring is a 67 y.o. male.  Vinicio Ring is here for follow up of ENT complaints. The patient has had problems with cyst of sinus found on CT of the head. The symptoms are not localized to a particular location. The patient has had mild symptoms. The symptoms have been present for the last several months The symptoms are aggravated by  no identifiable factors. There have been no factors that have improved the symptoms. Patient has had recent cold. He states he has mild nasal congestion, nasal drainage and postnasal drainage which have not changed. Patient denies sore throat, cough, headaches and allergies. Patient has had CT of the sinus today.     Patient denies any identifiable symptoms related to nasal congestion and drainage pressure.     Study Result   EXAMINATION: CT HEAD WO CONTRAST-       2017 11:50 AM CST      HISTORY: r41.9; R41.9-Unspecified symptoms and signs involving cognitive  functions and awareness      In order to have a CT radiation dose as low as reasonably achievable  Automated Exposure Control was utilized for adjustment of the mA and/or  KV according to patient size.      DLP in mGycm= 682.      Axial, sagittal, and coronal noncontrast CT imaging of the head.      2 cm left maxillary sinus \"cyst\". This has a slightly expansile  appearance which favors a mucocele.  The visualized portions of the paranasal sinuses are otherwise clear.      The brain and ventricles have an age appropriate appearance.   There is no hemorrhage or mass-effect.   No acute infarction is seen.      No calvarial abnormality.      IMPRESSION:  1. No acute intracranial abnormality is seen.  2. Left maxillary sinus 2 cm cyst or " mucocele.                This report was finalized on 11/20/2017 15:40 by Dr. Carlos Billy MD.        2/27/18 CT sinus             Past Medical History:   Diagnosis Date   • Antral cyst 12/14/2017   • Chronic rhinitis 12/14/2017   • Crohn's disease    • Elevated PSA    • GERD (gastroesophageal reflux disease)        Past Surgical History:   Procedure Laterality Date   • CAPSULE ENDOSCOPY N/A 10/19/2016    Procedure: CAPSULE ENDOSCOPY AGILE;  Surgeon: Hector Mooney MD;  Location:  [facility];  Service:    • CHOLECYSTECTOMY     • CHOLECYSTECTOMY     • COLONOSCOPY N/A 10/19/2016    Procedure: COLONOSCOPY WITH ANESTHESIA;  Surgeon: Hector Mooney MD;  Location:  [facility];  Service:    • ENDOSCOPY N/A 10/19/2016    Procedure: ESOPHAGOGASTRODUODENOSCOPY WITH ANESTHESIA;  Surgeon: Hector Mooney MD;  Location:  [facility];  Service:        Outpatient Prescriptions Marked as Taking for the 2/27/18 encounter (Office Visit) with Corey Slaughter MD   Medication Sig Dispense Refill   • azaTHIOprine (IMURAN) 50 MG tablet Take 1 tablet by mouth Daily. 30 tablet 11   • CALCIUM PO Take  by mouth Every Other Day As Needed.     • Famotidine (PEPCID AC PO) Take  by mouth Every Night.     • mesalamine (PENTASA) 500 MG CR capsule Take 2 capsules by mouth 4 (Four) Times a Day. 120 capsule 11   • ondansetron (ZOFRAN) 8 MG tablet Take  by mouth Every 8 (Eight) Hours As Needed for Nausea or Vomiting.         Review of patient's allergies indicates no known allergies.    Family History   Problem Relation Age of Onset   • No Known Problems Father    • No Known Problems Mother    • Colon cancer Maternal Aunt    • Colon polyps Paternal Uncle        Social History     Social History   • Marital status:      Spouse name: N/A   • Number of children: N/A   • Years of education: N/A     Occupational History   • Not on file.     Social History Main Topics   • Smoking status: Never Smoker   • Smokeless tobacco:  Never Used   • Alcohol use 8.4 oz/week     14 Glasses of wine per week      Comment: daily   • Drug use: No   • Sexual activity: Not on file     Other Topics Concern   • Not on file     Social History Narrative       Review of Systems   Constitutional: Negative.    HENT: Positive for congestion, postnasal drip and rhinorrhea.    Eyes: Negative.    Respiratory: Negative.    Cardiovascular: Negative.    Gastrointestinal: Negative.    Endocrine: Negative.    Genitourinary: Negative.    Musculoskeletal: Negative.    Skin: Negative.    Allergic/Immunologic: Negative.    Neurological: Negative.    Hematological: Negative.    Psychiatric/Behavioral: Negative.        Vitals:    02/27/18 1557   BP: 130/86   Pulse: 80   Temp: 98.7 °F (37.1 °C)       Body mass index is 23.6 kg/(m^2).    Objective     Physical Exam  CONSTITUTIONAL: well nourished, alert, oriented, in no acute distress     COMMUNICATION AND VOICE: able to communicate normally, normal voice quality    HEAD: normocephalic, no lesions, atraumatic, no tenderness, no masses     FACE: appearance normal, no lesions, no tenderness, no deformities, facial motion symmetric    SALIVARY GLANDS: parotid glands with no tenderness, no swelling, no masses, submandibular glands with normal size, nontender    EYES: ocular motility normal, eyelids normal, orbits normal, no proptosis, conjunctiva normal , pupils equal, round     EARS:  Hearing: response to conversational voice normal bilaterally   External Ears: auricles without lesions  Otoscopic: tympanic membrane appearance normal, no lesions, no perforation, normal mobility, no fluid    NOSE:  External Nose: structure normal, no tenderness on palpation, no nasal discharge, no lesions, no evidence of trauma, nostrils patent   Intranasal Exam:   Nasopharynx:   Bilateral diagnostic rigid nasal endoscopy was performed the Stortz 0° endoscopic:  No masses lesions ulcerations or other abnormalities noted on the left though mild  inferior septal spur was appreciated.  There were no polyps or other abnormalities.  Middle meatus appeared normal.  On the right small amount of polypoid changes were noted in the anterior inferior aspect of the middle turbinate with a mildly obstructed middle meatus but no purulence or erythema appreciated there is no edema or other evidence of abnormalities    ORAL:  Lips: upper and lower lips without lesion   Teeth: dentition within normal limits for age   Gums: gingivae healthy   Oral Mucosa: oral mucosa normal, no mucosal lesions   Floor of Mouth: Warthin’s duct patent, mucosa normal  Tongue: lingual mucosa normal without lesions, normal tongue mobility   Palate: soft and hard palates with normal mucosa and structure  Oropharynx: oropharyngeal mucosa normal    HYPOPHARYNX:   LARYNX: epiglottis and arytenoid cartilage within normal limits, vocal cord mucosa normal with normal mobility     NECK: neck appearance normal, no mass,  noted without erythema or tenderness    THYROID: no overt thyromegaly, no tenderness, nodules or mass present on palpation, position midline     LYMPH NODES: no lymphadenopathy    CHEST/RESPIRATORY: respiratory effort normal, normal breath sounds     CARDIOVASCULAR: rate and rhythm normal, extremities without cyanosis or edema      NEUROLOGIC/PSYCHIATRIC: oriented to time, place and person, mood normal, affect appropriate, CN II-XII intact grossly    Assessment/Plan   Vinicio was seen today for follow-up.    Diagnoses and all orders for this visit:    Chronic maxillary sinusitis    Nasal polyp, benign  Comments:  right      * Surgery not found *  No orders of the defined types were placed in this encounter.    No Follow-up on file.       Patient Instructions   Natural history and pathophysiology of mucous retention cyst and polyps were discussed    Recommendation for follow-up was stressed  Call or return for problems particularly any sinus pressure or drainage congestion or other  difficulties  Continue intranasal steroid sprays    Follow-up in 6 months with repeat CT scan

## 2018-03-14 DIAGNOSIS — J31.0 CHRONIC RHINITIS, UNSPECIFIED TYPE: ICD-10-CM

## 2018-03-14 DIAGNOSIS — J34.1: ICD-10-CM

## 2018-03-23 ENCOUNTER — APPOINTMENT (OUTPATIENT)
Dept: LAB | Facility: HOSPITAL | Age: 68
End: 2018-03-23

## 2018-03-23 LAB
ALBUMIN SERPL-MCNC: 3.9 G/DL (ref 3.5–5)
ALBUMIN/GLOB SERPL: 1.5 G/DL (ref 1.1–2.5)
ALP SERPL-CCNC: 96 U/L (ref 24–120)
ALT SERPL W P-5'-P-CCNC: 30 U/L (ref 0–54)
ANION GAP SERPL CALCULATED.3IONS-SCNC: 9 MMOL/L (ref 4–13)
AST SERPL-CCNC: 19 U/L (ref 7–45)
BASOPHILS # BLD AUTO: 0.02 10*3/MM3 (ref 0–0.2)
BASOPHILS NFR BLD AUTO: 0.4 % (ref 0–2)
BILIRUB SERPL-MCNC: 0.4 MG/DL (ref 0.1–1)
BUN BLD-MCNC: 16 MG/DL (ref 5–21)
BUN/CREAT SERPL: 18.2 (ref 7–25)
CALCIUM SPEC-SCNC: 9.2 MG/DL (ref 8.4–10.4)
CHLORIDE SERPL-SCNC: 101 MMOL/L (ref 98–110)
CO2 SERPL-SCNC: 30 MMOL/L (ref 24–31)
CREAT BLD-MCNC: 0.88 MG/DL (ref 0.5–1.4)
DEPRECATED RDW RBC AUTO: 43 FL (ref 40–54)
EOSINOPHIL # BLD AUTO: 0.07 10*3/MM3 (ref 0–0.7)
EOSINOPHIL NFR BLD AUTO: 1.3 % (ref 0–4)
ERYTHROCYTE [DISTWIDTH] IN BLOOD BY AUTOMATED COUNT: 12.9 % (ref 12–15)
GFR SERPL CREATININE-BSD FRML MDRD: 87 ML/MIN/1.73
GLOBULIN UR ELPH-MCNC: 2.6 GM/DL
GLUCOSE BLD-MCNC: 101 MG/DL (ref 70–100)
HCT VFR BLD AUTO: 41.9 % (ref 40–52)
HGB BLD-MCNC: 14.9 G/DL (ref 14–18)
IMM GRANULOCYTES # BLD: 0.03 10*3/MM3 (ref 0–0.03)
IMM GRANULOCYTES NFR BLD: 0.5 % (ref 0–5)
LYMPHOCYTES # BLD AUTO: 0.81 10*3/MM3 (ref 0.72–4.86)
LYMPHOCYTES NFR BLD AUTO: 14.5 % (ref 15–45)
MCH RBC QN AUTO: 32.7 PG (ref 28–32)
MCHC RBC AUTO-ENTMCNC: 35.6 G/DL (ref 33–36)
MCV RBC AUTO: 91.9 FL (ref 82–95)
MONOCYTES # BLD AUTO: 0.32 10*3/MM3 (ref 0.19–1.3)
MONOCYTES NFR BLD AUTO: 5.7 % (ref 4–12)
NEUTROPHILS # BLD AUTO: 4.34 10*3/MM3 (ref 1.87–8.4)
NEUTROPHILS NFR BLD AUTO: 77.6 % (ref 39–78)
NRBC BLD MANUAL-RTO: 0 /100 WBC (ref 0–0)
PLATELET # BLD AUTO: 226 10*3/MM3 (ref 130–400)
PMV BLD AUTO: 9.7 FL (ref 6–12)
POTASSIUM BLD-SCNC: 4.2 MMOL/L (ref 3.5–5.3)
PROT SERPL-MCNC: 6.5 G/DL (ref 6.3–8.7)
RBC # BLD AUTO: 4.56 10*6/MM3 (ref 4.8–5.9)
SODIUM BLD-SCNC: 140 MMOL/L (ref 135–145)
WBC NRBC COR # BLD: 5.59 10*3/MM3 (ref 4.8–10.8)

## 2018-03-23 PROCEDURE — 80053 COMPREHEN METABOLIC PANEL: CPT | Performed by: INTERNAL MEDICINE

## 2018-03-23 PROCEDURE — 84153 ASSAY OF PSA TOTAL: CPT | Performed by: UROLOGY

## 2018-03-23 PROCEDURE — 36415 COLL VENOUS BLD VENIPUNCTURE: CPT | Performed by: INTERNAL MEDICINE

## 2018-03-23 PROCEDURE — 84154 ASSAY OF PSA FREE: CPT | Performed by: UROLOGY

## 2018-03-23 PROCEDURE — 85025 COMPLETE CBC W/AUTO DIFF WBC: CPT | Performed by: INTERNAL MEDICINE

## 2018-03-24 LAB
PSA FREE MFR SERPL: 22.5 %
PSA FREE SERPL-MCNC: 4.25 NG/ML
PSA SERPL-MCNC: 18.9 NG/ML (ref 0–4)

## 2018-03-26 ENCOUNTER — TELEPHONE (OUTPATIENT)
Dept: UROLOGY | Facility: CLINIC | Age: 68
End: 2018-03-26

## 2018-03-26 DIAGNOSIS — R97.20 ELEVATED PROSTATE SPECIFIC ANTIGEN (PSA): Primary | ICD-10-CM

## 2018-03-26 NOTE — PROGRESS NOTES
Cirilo we need to schedule him for a ultrasound and biopsy here in the office.    I do want again Keflex first as well do it at the week I come back

## 2018-03-26 NOTE — PROGRESS NOTES
I spoke with the patient on the phone regarding his PSA it is jumped back up again and 18.9 for inexplicable reasons he is not on his testosterone and he has been on for some time now.    I indicated to him my experience is that when the PSA bounces around like this is due more to inflammation than anything else however certainly prostate cancer cannot be ruled out he did have a negative MRI at [place] back in November and I told him really another MRI I do not think would be recommended.    We discussed the biopsy at length I think he is anxious now to go ahead and proceed with a biopsy indicated to him that the hematuria he had previously is a relatively uncommon complication and I would be more concerned about of infection particularly with him being on Imuran and Pentasa.    We discussed the risk of infection etc. and I think of answered all his questions at this time he does want to go ahead and proceed.    I did discuss with him also how we do the procedure here in the office and he is comfortable with with a go ahead going ahead and proceeding

## 2018-03-27 ENCOUNTER — TELEPHONE (OUTPATIENT)
Dept: UROLOGY | Facility: CLINIC | Age: 68
End: 2018-03-27

## 2018-03-27 NOTE — TELEPHONE ENCOUNTER
Called and left patient a voicemail about biopsy. He is schd for April 10 at 8:00 am. Start antibiotic the day prior, fleets enema 1-2 hrs prior to appt time on April 10, and no aspirin/blood thinners starting 7 days prior to biospy.

## 2018-04-09 ENCOUNTER — TELEPHONE (OUTPATIENT)
Dept: UROLOGY | Facility: CLINIC | Age: 68
End: 2018-04-09

## 2018-04-09 DIAGNOSIS — R97.20 ELEVATED PROSTATE SPECIFIC ANTIGEN (PSA): Primary | ICD-10-CM

## 2018-04-09 RX ORDER — CIPROFLOXACIN 500 MG/1
500 TABLET, FILM COATED ORAL 2 TIMES DAILY
Qty: 8 TABLET | Refills: 0 | Status: SHIPPED | OUTPATIENT
Start: 2018-04-09 | End: 2018-04-13

## 2018-04-09 NOTE — TELEPHONE ENCOUNTER
Patient called and he stated his antibiotic for his Biopsy and he started to itch. I spoke with Sharon and she said to take Benedryll and the itching should subside.

## 2018-04-10 ENCOUNTER — PROCEDURE VISIT (OUTPATIENT)
Dept: UROLOGY | Facility: CLINIC | Age: 68
End: 2018-04-10

## 2018-04-10 DIAGNOSIS — R97.20 ELEVATED PROSTATE SPECIFIC ANTIGEN (PSA): Primary | ICD-10-CM

## 2018-04-10 LAB
BILIRUB BLD-MCNC: NEGATIVE MG/DL
CLARITY, POC: CLEAR
COLOR UR: YELLOW
GLUCOSE UR STRIP-MCNC: NEGATIVE MG/DL
KETONES UR QL: NEGATIVE
LEUKOCYTE EST, POC: NEGATIVE
NITRITE UR-MCNC: NEGATIVE MG/ML
PH UR: 5 [PH] (ref 5–8)
PROT UR STRIP-MCNC: NEGATIVE MG/DL
RBC # UR STRIP: NEGATIVE /UL
SP GR UR: 1.02 (ref 1–1.03)
UROBILINOGEN UR QL: NORMAL

## 2018-04-10 PROCEDURE — G0416 PROSTATE BIOPSY, ANY MTHD: HCPCS | Performed by: UROLOGY

## 2018-04-10 PROCEDURE — 55700 PR PROSTATE NEEDLE BIOPSY ANY APPROACH: CPT | Performed by: UROLOGY

## 2018-04-10 PROCEDURE — 76942 ECHO GUIDE FOR BIOPSY: CPT | Performed by: UROLOGY

## 2018-04-10 PROCEDURE — 81003 URINALYSIS AUTO W/O SCOPE: CPT | Performed by: UROLOGY

## 2018-04-10 PROCEDURE — 76872 US TRANSRECTAL: CPT | Performed by: UROLOGY

## 2018-04-10 RX ORDER — GENTAMICIN SULFATE 40 MG/ML
80 INJECTION, SOLUTION INTRAMUSCULAR; INTRAVENOUS EVERY 12 HOURS
Status: SHIPPED | OUTPATIENT
Start: 2018-04-10 | End: 2018-04-11

## 2018-04-10 NOTE — PROGRESS NOTES
CC: I am here for my prostate biopsy    TRUS PROSTATE WITH BIOPSY PROCEDURE NOTE  Indications:  Elevated PSA    Pre-operative prep:  fleets enema, oral antibiotic, IM gentamicin and stopped aspirin, coumadin, and other anticoagulants      Procedure:    After proper identification of patient and procedure, patient was placed in the left later decubitus position.  2% lidocaine jelly was instilled per rectum  for topical anesthesia.  The ultrasound probe was gently inserted per rectum. Prostate was scanned from the base of the bladder to the apex.  20 cc of 1% lidocaine plain was then used to perform a prostate nerve block injecting the junction of the seminal vesicle and bladder laterally.      Prostate length: 46.4 cm    Prostate width: 53 cm    Prostate height: 36.9 cm    Prostate volume: 47.5 cc    PSA density: 0.4    Abnormal findings:  No lesions noted, Normal seminal vesicles, Periurethral calcifications and Transition zone enlargement    Median lobe:  no    A total of 12 biopsies were taken from the base, apex, and mid portion of the gland on both the right and the left sides.     Patient tolerated the procedure well    Complications: none    Estimated blood loss: minimal    Mr. Ring was given instructions for follow up.  He will notify the office if he has excessive hematuria, hematochezia, fevers, perineal, or abdominal pain.  Rectal exam following procedure showed no evidence of any significant bleeding postop instructions given he will call when necessary problems    Follow up:   He will follow up in 1 week  for pathology results.

## 2018-04-11 LAB
CYTO UR: NORMAL
LAB AP CASE REPORT: NORMAL
Lab: NORMAL
PATH REPORT.FINAL DX SPEC: NORMAL
PATH REPORT.GROSS SPEC: NORMAL

## 2018-04-12 NOTE — PROGRESS NOTES
I spoke with the patient today regarding his biopsies these were all benign he was obviously relieved.    On the recommend that we seen back again in 6 months with a PSA actually please send him an appointment and request for blood work thank you

## 2018-06-11 DIAGNOSIS — K50.00 CROHN'S DISEASE OF SMALL INTESTINE WITHOUT COMPLICATION (HCC): Primary | ICD-10-CM

## 2018-07-13 ENCOUNTER — LAB (OUTPATIENT)
Dept: LAB | Facility: HOSPITAL | Age: 68
End: 2018-07-13
Attending: INTERNAL MEDICINE

## 2018-07-13 DIAGNOSIS — K50.00 CROHN'S DISEASE OF SMALL INTESTINE WITHOUT COMPLICATION (HCC): ICD-10-CM

## 2018-07-13 LAB
ALBUMIN SERPL-MCNC: 3.7 G/DL (ref 3.5–5)
ALBUMIN/GLOB SERPL: 1.5 G/DL (ref 1.1–2.5)
ALP SERPL-CCNC: 87 U/L (ref 24–120)
ALT SERPL W P-5'-P-CCNC: 25 U/L (ref 0–54)
ANION GAP SERPL CALCULATED.3IONS-SCNC: 10 MMOL/L (ref 4–13)
AST SERPL-CCNC: 20 U/L (ref 7–45)
BASOPHILS # BLD AUTO: 0.03 10*3/MM3 (ref 0–0.2)
BASOPHILS NFR BLD AUTO: 0.7 % (ref 0–2)
BILIRUB SERPL-MCNC: 0.4 MG/DL (ref 0.1–1)
BUN BLD-MCNC: 15 MG/DL (ref 5–21)
BUN/CREAT SERPL: 15.6 (ref 7–25)
CALCIUM SPEC-SCNC: 8.8 MG/DL (ref 8.4–10.4)
CHLORIDE SERPL-SCNC: 102 MMOL/L (ref 98–110)
CO2 SERPL-SCNC: 27 MMOL/L (ref 24–31)
CREAT BLD-MCNC: 0.96 MG/DL (ref 0.5–1.4)
DEPRECATED RDW RBC AUTO: 41.2 FL (ref 40–54)
EOSINOPHIL # BLD AUTO: 0.04 10*3/MM3 (ref 0–0.7)
EOSINOPHIL NFR BLD AUTO: 0.9 % (ref 0–4)
ERYTHROCYTE [DISTWIDTH] IN BLOOD BY AUTOMATED COUNT: 12.6 % (ref 12–15)
GFR SERPL CREATININE-BSD FRML MDRD: 79 ML/MIN/1.73
GLOBULIN UR ELPH-MCNC: 2.4 GM/DL
GLUCOSE BLD-MCNC: 100 MG/DL (ref 70–100)
HCT VFR BLD AUTO: 40.2 % (ref 40–52)
HGB BLD-MCNC: 14.4 G/DL (ref 14–18)
IMM GRANULOCYTES # BLD: 0.03 10*3/MM3 (ref 0–0.03)
IMM GRANULOCYTES NFR BLD: 0.7 % (ref 0–5)
LYMPHOCYTES # BLD AUTO: 0.76 10*3/MM3 (ref 0.72–4.86)
LYMPHOCYTES NFR BLD AUTO: 17 % (ref 15–45)
MCH RBC QN AUTO: 32.3 PG (ref 28–32)
MCHC RBC AUTO-ENTMCNC: 35.8 G/DL (ref 33–36)
MCV RBC AUTO: 90.1 FL (ref 82–95)
MONOCYTES # BLD AUTO: 0.33 10*3/MM3 (ref 0.19–1.3)
MONOCYTES NFR BLD AUTO: 7.4 % (ref 4–12)
NEUTROPHILS # BLD AUTO: 3.29 10*3/MM3 (ref 1.87–8.4)
NEUTROPHILS NFR BLD AUTO: 73.3 % (ref 39–78)
NRBC BLD MANUAL-RTO: 0 /100 WBC (ref 0–0)
PLATELET # BLD AUTO: 210 10*3/MM3 (ref 130–400)
PMV BLD AUTO: 9.8 FL (ref 6–12)
POTASSIUM BLD-SCNC: 3.9 MMOL/L (ref 3.5–5.3)
PROT SERPL-MCNC: 6.1 G/DL (ref 6.3–8.7)
RBC # BLD AUTO: 4.46 10*6/MM3 (ref 4.8–5.9)
SODIUM BLD-SCNC: 139 MMOL/L (ref 135–145)
WBC NRBC COR # BLD: 4.48 10*3/MM3 (ref 4.8–10.8)

## 2018-07-13 PROCEDURE — 80053 COMPREHEN METABOLIC PANEL: CPT | Performed by: INTERNAL MEDICINE

## 2018-07-13 PROCEDURE — 36415 COLL VENOUS BLD VENIPUNCTURE: CPT

## 2018-07-13 PROCEDURE — 85025 COMPLETE CBC W/AUTO DIFF WBC: CPT | Performed by: INTERNAL MEDICINE

## 2018-08-13 ENCOUNTER — OFFICE VISIT (OUTPATIENT)
Dept: GASTROENTEROLOGY | Facility: CLINIC | Age: 68
End: 2018-08-13

## 2018-08-13 VITALS
TEMPERATURE: 97.2 F | OXYGEN SATURATION: 99 % | HEART RATE: 84 BPM | BODY MASS INDEX: 23.98 KG/M2 | WEIGHT: 177 LBS | DIASTOLIC BLOOD PRESSURE: 84 MMHG | HEIGHT: 72 IN | SYSTOLIC BLOOD PRESSURE: 130 MMHG

## 2018-08-13 DIAGNOSIS — Z79.899 ENCOUNTER FOR MONITORING IMMUNOMODULATING THERAPY: ICD-10-CM

## 2018-08-13 DIAGNOSIS — R10.84 GENERALIZED ABDOMINAL PAIN: ICD-10-CM

## 2018-08-13 DIAGNOSIS — K50.012 CROHN'S DISEASE OF SMALL INTESTINE WITH INTESTINAL OBSTRUCTION (HCC): Primary | ICD-10-CM

## 2018-08-13 DIAGNOSIS — Z51.81 ENCOUNTER FOR MONITORING IMMUNOMODULATING THERAPY: ICD-10-CM

## 2018-08-13 PROCEDURE — 99213 OFFICE O/P EST LOW 20 MIN: CPT | Performed by: INTERNAL MEDICINE

## 2018-08-13 RX ORDER — DICYCLOMINE HYDROCHLORIDE 10 MG/1
10 CAPSULE ORAL 3 TIMES DAILY PRN
Qty: 90 CAPSULE | Refills: 6 | Status: SHIPPED | OUTPATIENT
Start: 2018-08-13 | End: 2020-09-01

## 2018-08-13 NOTE — PROGRESS NOTES
[facility]    Chief Complaint   Patient presents with   • Crohn's Disease       Subjective     HPI    Vinicio Ring is a 67 y.o. male who presents with a chief complaint of  Crohn's     Tells me he was having some intermittent abdominal cramps over the last 6 months.  He noted that it was worse when he was under stress.  He states he had a big case about ready to go trial he started having increased abdominal cramps.  They ended up settling the case and once they did that, is just went away and the abdominal cramping went away.  He never had any abdominal bloating.  Never had any nausea or vomiting.  Did not have any diarrhea with it.  He just had abdominal cramps.  He was able to eat.  Never had any fever chills or sweats.  He continued on Pentasa and Imuran.  He is never taken any antispasmodic such as Bentyl.    He does tell me his PSA has fluctuated.  They did a biopsy and everything came back negative.  They planned the following.  He has no evidence of prostate cancer.  He also had a recent lesion removed from his ear which was benign and he has follow-up appointments with dermatology scheduled.  He has never been vaccinated to hepatitis A or B that he is aware.  Prior labs showed no immunity    Currently feels well.  He is had no abdominal pain and some time.    ---------------------------------------------------------------------------------------------------------------------------------------------------------------------------------------------------------------------------------------------------  2/12/2018 A/P  He is relatively stable.  We once again talked about the option of increasing the Imuran.  Prior to doing that I would check a metabolite level.  We talked about the risk of increasing Imuran including that of immunosuppression.  As before we discuss the diuretic possibility that Imuran by suppressing the immune system could have a role with how is body fights off  prostate cancer if he does have prostate cancer.  It is a good sign that his PSA has dropped but he knows he needs to continue stay on top of this with urology.  I did review his last office visit note with urology He states his intestinal symptoms are not enough to add any extra therapy and increase the side effect profile this time.  I think that is reasonable.     He will be due for a T Spot in June.  He is due for a CBC and CMP.  I gave him the and he is going take that to his primary care office and have his labs done there.  I will see him back in the office in 6 months.      Past Medical History:   Diagnosis Date   • Antral cyst 12/14/2017   • Chronic rhinitis 12/14/2017   • Crohn's disease (CMS/HCC)    • Elevated PSA    • GERD (gastroesophageal reflux disease)        Past Surgical History:   Procedure Laterality Date   • CAPSULE ENDOSCOPY N/A 10/19/2016    Procedure: CAPSULE ENDOSCOPY AGILE;  Surgeon: Hector Mooney MD;  Location:  [facility];  Service:    • CHOLECYSTECTOMY     • CHOLECYSTECTOMY     • COLONOSCOPY N/A 10/19/2016    Procedure: COLONOSCOPY WITH ANESTHESIA;  Surgeon: Hector Mooney MD;  Location:  [facility];  Service:    • ENDOSCOPY N/A 10/19/2016    Procedure: ESOPHAGOGASTRODUODENOSCOPY WITH ANESTHESIA;  Surgeon: Hector Mooney MD;  Location: [facility] ENDOSCOPY;  Service:          Current Outpatient Prescriptions:   •  azaTHIOprine (IMURAN) 50 MG tablet, Take 1 tablet by mouth Daily., Disp: 30 tablet, Rfl: 11  •  Famotidine (PEPCID AC PO), Take  by mouth Every Night., Disp: , Rfl:   •  fluticasone (FLONASE) 50 MCG/ACT nasal spray, Instill 2 sprays in each nostril once daily, Disp: 16 g, Rfl: 5  •  mesalamine (PENTASA) 500 MG CR capsule, Take 2 capsules by mouth 4 (Four) Times a Day., Disp: 120 capsule, Rfl: 9  •  ondansetron (ZOFRAN) 8 MG tablet, Take  by mouth Every 8 (Eight) Hours As Needed for Nausea or Vomiting., Disp: , Rfl:   •  dicyclomine (BENTYL) 10 MG capsule, Take 1  capsule by mouth 3 (Three) Times a Day As Needed (for abdominal discomfort)., Disp: 90 capsule, Rfl: 6  •  hepatitis A (HAVRIX) 1440 EL U/ML vaccine, Inject 1 mL into the appropriate muscle as directed by prescriber 1 (One) Time for 1 dose., Disp: 1 mL, Rfl: 1  •  Hepatitis B Vac Recombinant 20 MCG/ML injection, Inject 1 mL into the appropriate muscle as directed by prescriber See Admin Instructions., Disp: 3 mL, Rfl: 0    No Known Allergies    Social History     Social History   • Marital status:      Spouse name: N/A   • Number of children: N/A   • Years of education: N/A     Occupational History   • Not on file.     Social History Main Topics   • Smoking status: Never Smoker   • Smokeless tobacco: Never Used   • Alcohol use 8.4 oz/week     14 Glasses of wine per week      Comment: daily   • Drug use: No   • Sexual activity: Defer     Other Topics Concern   • Not on file     Social History Narrative   • No narrative on file       Family History   Problem Relation Age of Onset   • No Known Problems Father    • No Known Problems Mother    • Colon cancer Maternal Aunt    • Colon polyps Paternal Uncle        Review of Systems  General no fever chills or sweats weight stable  Gastrointestinal: Not present-abdominal pain, constipation, diarrhea, dysphagia, hematemesis, melena, odynophagia, nausea, vomiting, pyrosis, regurgitation, hematochezia,    Objective     Vitals:    08/13/18 1340   BP: 130/84   Pulse: 84   Temp: 97.2 °F (36.2 °C)   SpO2: 99%       Physical Exam  No acute distress. Vital signs as documented. Skin warm and dry and without overt rashes. EOMI, sclera anicteric.  Neck without JVD or masses. Lungs clear to auscultation bilaterally, no rales. Heart exam notable for regular rhythm, normal sounds and absence of loud murmurs, rubs or gallops. Abdomen is soft, nontender, non distended, normal bowel sounds and without evidence of organomegaly, masses, or abdominal aortic enlargement. Extremities  nonedematous, no cyanosis. Neuro alert, moves extremities.        Assessment/Plan   Problem List Items Addressed This Visit        Digestive    Crohn's disease of small intestine with intestinal obstruction (CMS/HCC) - Primary    Relevant Orders    TSPOT    XR Chest 2 View       Nervous and Auditory    Generalized abdominal pain       Other    Encounter for monitoring immunomodulating therapy    Overview     TPMT enzyme levels are intermediate which can possibly increase risk of toxicity with Imuran at high doses.    T Spot negative June 2017, repeat ordered August 2018  Test x-ray ordered August 2018  Patient has received the Pneumovax, as well as the shingles vaccine.  He did receive the flu shot for the 2017/18 season.  He will need repeated next season  He sees dermatology, Dr. Taylor, routinely and was advised to continue and why.  Hepatitis A and B vaccinations ordered August 2018         Relevant Orders    TSPOT    XR Chest 2 View            He is stable now.  I question of his intermittent dull pain was active Crohn's or irritable bowel.  It was more prominent when he was under stress as he notes.  It has subsided.  I think is reasonable to try him on when necessary Bentyl when he gets the cramps.  We can use this as a Test itself.  He is in agreement.  I went through the side effects of the medication with him.    He will also continue on the Imuran and Pentasa as is.  He is due to have labs again in October as discussed.  I do advise checking a chest x-ray and a T Spot in the near future.  I also advised that he get vaccinated to hepatitis A and B and why.  He agreed to do so.  We will send in order to his pharmacy.    I will see him back in the office in 6 months sooner if needed.    Continue ongoing management by primary care provider and other specialists.     Patient's Body mass index is 24.01 kg/m². BMI is within normal parameters. No follow-up required.        EMR Dragon/transcription disclaimer:  Much  of this encounter note is electronic transcription/translation of spoken language to printed text.  The electronic translation of spoken language may be erroneous, or at times, nonsensical words or phrases may be inadvertently transcribed.  Although I have reviewed the note for such errors, some may still exist.    Hector Mooney MD  4:36 PM  08/13/18

## 2018-08-13 NOTE — PROGRESS NOTES
Answers for HPI/ROS submitted by the patient on 8/6/2018   Abdominal pain  Chronicity: chronic  Onset: more than 1 year ago  Onset quality: gradual  Frequency: intermittently  Episode duration: 3 hours  Progression since onset: unchanged  Pain location: suprapubic region, right flank  Pain - numeric: 6/10  Pain quality: cramping  Radiates to: does not radiate  anorexia: Yes  arthralgias: No  belching: No  constipation: No  diarrhea: No  dysuria: No  fever: No  flatus: No  frequency: No  headaches: No  hematochezia: No  hematuria: No  melena: No  myalgias: No  nausea: No  weight loss: No  vomiting: No  Aggravated by: nothing  Relieved by: nothing

## 2018-08-28 RX ORDER — MESALAMINE 500 MG/1
1000 CAPSULE, EXTENDED RELEASE ORAL 4 TIMES DAILY
Qty: 720 CAPSULE | Refills: 3 | Status: SHIPPED | OUTPATIENT
Start: 2018-08-28 | End: 2019-09-23

## 2018-08-29 NOTE — PROGRESS NOTES
"YOB: 1950  Location: [facility]  Location Address: [address] [place] [address], [facility], [address] [place] [address]  Location Phone: [phone]    Chief Complaint   Patient presents with   • Follow-up     sinus       History of Present Illness  Vinicio Ring is a 67 y.o. male.  Vinicio Ring is here for follow up of ENT complaints. The patient has had problems with cyst of sinus found on CT of the head. The symptoms are not localized to a particular location. The patient has had stable symptoms. The symptoms have been present for the last several months. The symptoms are aggravated by  no identifiable factors. There have been no factors that have improved the symptoms.  Patient has had a relatively stable CT of the sinus today.      Patient denies any identifiable symptoms related to nasal congestion and drainage pressure.     Study Result   EXAMINATION: CT HEAD WO CONTRAST-       2017 11:50 AM CST      HISTORY: r41.9; R41.9-Unspecified symptoms and signs involving cognitive  functions and awareness      In order to have a CT radiation dose as low as reasonably achievable  Automated Exposure Control was utilized for adjustment of the mA and/or  KV according to patient size.      DLP in mGycm= 682.      Axial, sagittal, and coronal noncontrast CT imaging of the head.      2 cm left maxillary sinus \"cyst\". This has a slightly expansile  appearance which favors a mucocele.  The visualized portions of the paranasal sinuses are otherwise clear.      The brain and ventricles have an age appropriate appearance.   There is no hemorrhage or mass-effect.   No acute infarction is seen.      No calvarial abnormality.      IMPRESSION:  1. No acute intracranial abnormality is seen.  2. Left maxillary sinus 2 cm cyst or mucocele.                This report was finalized on 2017 15:40 by Dr. Carlos Billy MD.         18 CT sinus                              Past Medical History:   Diagnosis Date   • " Antral cyst 12/14/2017   • Chronic rhinitis 12/14/2017   • Crohn's disease (CMS/HCC)    • Elevated PSA    • GERD (gastroesophageal reflux disease)        Past Surgical History:   Procedure Laterality Date   • CAPSULE ENDOSCOPY N/A 10/19/2016    Procedure: CAPSULE ENDOSCOPY AGILE;  Surgeon: Hector Mooney MD;  Location: [facility] ENDOSCOPY;  Service:    • CHOLECYSTECTOMY     • CHOLECYSTECTOMY     • COLONOSCOPY N/A 10/19/2016    Procedure: COLONOSCOPY WITH ANESTHESIA;  Surgeon: Hector Mooney MD;  Location:  [facility];  Service:    • ENDOSCOPY N/A 10/19/2016    Procedure: ESOPHAGOGASTRODUODENOSCOPY WITH ANESTHESIA;  Surgeon: Hector Mooney MD;  Location: [facility] ENDOSCOPY;  Service:        Outpatient Prescriptions Marked as Taking for the 8/30/18 encounter (Office Visit) with Corey Slaughter MD   Medication Sig Dispense Refill   • azaTHIOprine (IMURAN) 50 MG tablet Take 1 tablet by mouth Daily. 30 tablet 11   • dicyclomine (BENTYL) 10 MG capsule Take 1 capsule by mouth 3 (Three) Times a Day As Needed (for abdominal discomfort). 90 capsule 6   • Famotidine (PEPCID AC PO) Take  by mouth Every Night.     • fluticasone (FLONASE) 50 MCG/ACT nasal spray Instill 2 sprays in each nostril once daily 16 g 5   • Hepatitis B Vac Recombinant 20 MCG/ML injection Inject 1 mL into the appropriate muscle as directed by prescriber See Admin Instructions. 3 mL 0   • mesalamine (PENTASA) 500 MG CR capsule Take 2 capsules by mouth 4 (Four) Times a Day. 720 capsule 3   • ondansetron (ZOFRAN) 8 MG tablet Take  by mouth Every 8 (Eight) Hours As Needed for Nausea or Vomiting.         Patient has no known allergies.    Family History   Problem Relation Age of Onset   • No Known Problems Father    • No Known Problems Mother    • Colon cancer Maternal Aunt    • Colon polyps Paternal Uncle        Social History     Social History   • Marital status:      Spouse name: N/A   • Number of children: N/A   • Years of education:  N/A     Occupational History   • Not on file.     Social History Main Topics   • Smoking status: Never Smoker   • Smokeless tobacco: Never Used   • Alcohol use 8.4 oz/week     14 Glasses of wine per week      Comment: daily   • Drug use: No   • Sexual activity: Defer     Other Topics Concern   • Not on file     Social History Narrative   • No narrative on file       Review of Systems   Constitutional: Negative for activity change, appetite change, chills, diaphoresis, fatigue, fever and unexpected weight change.   HENT: Negative for congestion, ear discharge, ear pain, facial swelling, hearing loss, mouth sores, nosebleeds, postnasal drip, rhinorrhea, sinus pressure, sneezing, sore throat, tinnitus, trouble swallowing and voice change.         Maxillary sinus cyst left, kobe cyst right   Eyes: Negative for pain, discharge, redness, itching and visual disturbance.   Respiratory: Negative for apnea, cough, choking, chest tightness, shortness of breath, wheezing and stridor.    Gastrointestinal: Negative for nausea and vomiting.   Endocrine: Negative for cold intolerance and heat intolerance.   Musculoskeletal: Negative for arthralgias, back pain, gait problem, neck pain and neck stiffness.   Skin: Negative for rash.   Allergic/Immunologic: Negative for environmental allergies and food allergies.   Neurological: Negative for dizziness, tremors, seizures, syncope, facial asymmetry, speech difficulty, weakness, light-headedness, numbness and headaches.   Hematological: Negative for adenopathy. Does not bruise/bleed easily.   Psychiatric/Behavioral: Negative for behavioral problems, sleep disturbance and suicidal ideas. The patient is not nervous/anxious and is not hyperactive.        Vitals:    08/30/18 1508   BP: 122/80   Temp: 97.5 °F (36.4 °C)       Body mass index is 24.01 kg/m².    Objective     Physical Exam  CONSTITUTIONAL: well nourished, alert, oriented, in no acute distress     COMMUNICATION AND VOICE: able  to communicate normally, normal voice quality    HEAD: normocephalic, no lesions, atraumatic, no tenderness, no masses     FACE: appearance normal, no lesions, no tenderness, no deformities, facial motion symmetric    EYES: ocular motility normal, eyelids normal, orbits normal, no proptosis, conjunctiva normal , pupils equal, round     EARS:  Hearing: response to conversational voice normal bilaterally   External Ears: auricles without lesions    NOSE:  External Nose: structure normal, no tenderness on palpation, no nasal discharge, no lesions, no evidence of trauma, nostrils patent   Intranasal Exam: see endoscopy    ORAL:  Lips: upper and lower lips without lesion     NECK: neck appearance normal    CHEST/RESPIRATORY: respiratory effort normal, normal breath sounds     CARDIOVASCULAR: rate and rhythm normal, extremities without cyanosis or edema      NEUROLOGIC/PSYCHIATRIC: oriented to time, place and person, mood normal, affect appropriate, CN II-XII intact grossly    Assessment/Plan   Problems Addressed this Visit        Respiratory    Chronic rhinitis    Chronic maxillary sinusitis - Primary       Other    Nasal polyp, benign        * Surgery not found *  No orders of the defined types were placed in this encounter.    Return in about 1 year (around 8/30/2019) for Recheck sinus.       Patient Instructions   Continue medications as directed, due to minimal symptoms will treat conservatively. Will recheck at follow-up.

## 2018-08-30 ENCOUNTER — CLINICAL SUPPORT (OUTPATIENT)
Dept: OTOLARYNGOLOGY | Facility: CLINIC | Age: 68
End: 2018-08-30

## 2018-08-30 ENCOUNTER — OFFICE VISIT (OUTPATIENT)
Dept: OTOLARYNGOLOGY | Facility: CLINIC | Age: 68
End: 2018-08-30

## 2018-08-30 VITALS
SYSTOLIC BLOOD PRESSURE: 122 MMHG | DIASTOLIC BLOOD PRESSURE: 80 MMHG | BODY MASS INDEX: 23.98 KG/M2 | WEIGHT: 177 LBS | TEMPERATURE: 97.5 F | HEIGHT: 72 IN

## 2018-08-30 DIAGNOSIS — J33.0 NASAL POLYP, BENIGN: ICD-10-CM

## 2018-08-30 DIAGNOSIS — J32.0 CHRONIC MAXILLARY SINUSITIS: Primary | ICD-10-CM

## 2018-08-30 DIAGNOSIS — J31.0 CHRONIC RHINITIS, UNSPECIFIED TYPE: ICD-10-CM

## 2018-08-30 DIAGNOSIS — J32.0 CHRONIC MAXILLARY SINUSITIS: ICD-10-CM

## 2018-08-30 PROCEDURE — 31231 NASAL ENDOSCOPY DX: CPT | Performed by: OTOLARYNGOLOGY

## 2018-08-30 PROCEDURE — 99214 OFFICE O/P EST MOD 30 MIN: CPT | Performed by: PHYSICIAN ASSISTANT

## 2018-08-30 PROCEDURE — 70486 CT MAXILLOFACIAL W/O DYE: CPT | Performed by: OTOLARYNGOLOGY

## 2018-08-30 NOTE — PROGRESS NOTES
PROCEDURE NOTE    Vinicio Ring    DATE OF PROCEDURE: 8/29/18    PROCEDURE:   Bilateral Diagnostic Rigid Nasal Endoscopy    PREPROCEDURE DIAGNOSIS:   History of left antral cyst    POSTPROCEDURE DIAGNOSIS:  SAME    ANESTHESIA:   none    PROCEDURE DESCRIPTION:    With the patient in the chair bilateral diagnostic rigid nasal endoscopy was performed with the Stortz 0° endoscope without difficulty.     An endoscope was passed along the left nasal floor to the nasopharynx.  It was then passed into the region of the middle meatus, middle turbinate, and the sphenoethmoid region. An identical procedure was performed on the right side.  The following findings were noted as stated below:    Findings: mild left septal deviation with mild inflammation of the left middle meatus but no evidence of polyposis.    Moderately severe right middle turbinate edema with minimal erythema and no purulence and no obvious evidence of polyposis but thickened secretions.    Condition:  Stable.  Patient tolerated procedure well.    Complications:  None  There was no significant bleeding.

## 2018-08-30 NOTE — PATIENT INSTRUCTIONS
Continue medications as directed, due to minimal symptoms will treat conservatively. Will recheck at follow-up.

## 2018-09-11 DIAGNOSIS — J33.0 NASAL POLYP, BENIGN: ICD-10-CM

## 2018-09-11 DIAGNOSIS — J32.0 CHRONIC MAXILLARY SINUSITIS: ICD-10-CM

## 2018-10-03 DIAGNOSIS — K50.00 CROHN'S DISEASE OF SMALL INTESTINE WITHOUT COMPLICATION (HCC): Primary | ICD-10-CM

## 2018-10-03 RX ORDER — FLUTICASONE PROPIONATE 50 MCG
SPRAY, SUSPENSION (ML) NASAL
Qty: 16 G | Refills: 5 | Status: SHIPPED | OUTPATIENT
Start: 2018-10-03 | End: 2019-08-29 | Stop reason: SDUPTHER

## 2019-02-07 ENCOUNTER — OFFICE VISIT (OUTPATIENT)
Dept: GASTROENTEROLOGY | Facility: CLINIC | Age: 69
End: 2019-02-07

## 2019-02-07 ENCOUNTER — LAB (OUTPATIENT)
Dept: LAB | Facility: HOSPITAL | Age: 69
End: 2019-02-07

## 2019-02-07 ENCOUNTER — TELEPHONE (OUTPATIENT)
Dept: GASTROENTEROLOGY | Facility: CLINIC | Age: 69
End: 2019-02-07

## 2019-02-07 VITALS
HEIGHT: 72 IN | TEMPERATURE: 96.2 F | DIASTOLIC BLOOD PRESSURE: 80 MMHG | OXYGEN SATURATION: 99 % | WEIGHT: 179 LBS | SYSTOLIC BLOOD PRESSURE: 118 MMHG | HEART RATE: 84 BPM | BODY MASS INDEX: 24.24 KG/M2

## 2019-02-07 DIAGNOSIS — Z51.81 ENCOUNTER FOR MONITORING IMMUNOMODULATING THERAPY: ICD-10-CM

## 2019-02-07 DIAGNOSIS — Z79.899 ENCOUNTER FOR MONITORING IMMUNOMODULATING THERAPY: ICD-10-CM

## 2019-02-07 DIAGNOSIS — K50.00 CROHN'S DISEASE OF SMALL INTESTINE WITHOUT COMPLICATION (HCC): Primary | ICD-10-CM

## 2019-02-07 DIAGNOSIS — K50.019 CROHN'S DISEASE OF SMALL INTESTINE WITH COMPLICATION (HCC): ICD-10-CM

## 2019-02-07 DIAGNOSIS — K50.019 CROHN'S DISEASE OF SMALL INTESTINE WITH COMPLICATION (HCC): Primary | ICD-10-CM

## 2019-02-07 DIAGNOSIS — K50.012 CROHN'S DISEASE OF SMALL INTESTINE WITH INTESTINAL OBSTRUCTION (HCC): ICD-10-CM

## 2019-02-07 DIAGNOSIS — R10.84 GENERALIZED ABDOMINAL PAIN: ICD-10-CM

## 2019-02-07 LAB
ALBUMIN SERPL-MCNC: 4.2 G/DL (ref 3.5–5)
ALBUMIN/GLOB SERPL: 1.7 G/DL (ref 1.1–2.5)
ALP SERPL-CCNC: 96 U/L (ref 24–120)
ALT SERPL W P-5'-P-CCNC: 17 U/L (ref 0–54)
ANION GAP SERPL CALCULATED.3IONS-SCNC: 8 MMOL/L (ref 4–13)
AST SERPL-CCNC: 24 U/L (ref 7–45)
BASOPHILS # BLD AUTO: 0.03 10*3/MM3 (ref 0–0.2)
BASOPHILS NFR BLD AUTO: 0.3 % (ref 0–2)
BILIRUB SERPL-MCNC: 0.7 MG/DL (ref 0.1–1)
BUN BLD-MCNC: 12 MG/DL (ref 5–21)
BUN/CREAT SERPL: 13.3 (ref 7–25)
CALCIUM SPEC-SCNC: 8.8 MG/DL (ref 8.4–10.4)
CHLORIDE SERPL-SCNC: 101 MMOL/L (ref 98–110)
CO2 SERPL-SCNC: 28 MMOL/L (ref 24–31)
CREAT BLD-MCNC: 0.9 MG/DL (ref 0.5–1.4)
CRP SERPL-MCNC: <0.5 MG/DL (ref 0–0.99)
DEPRECATED RDW RBC AUTO: 41.5 FL (ref 40–54)
EOSINOPHIL # BLD AUTO: 0.01 10*3/MM3 (ref 0–0.7)
EOSINOPHIL NFR BLD AUTO: 0.1 % (ref 0–4)
ERYTHROCYTE [DISTWIDTH] IN BLOOD BY AUTOMATED COUNT: 12.7 % (ref 12–15)
GFR SERPL CREATININE-BSD FRML MDRD: 84 ML/MIN/1.73
GLOBULIN UR ELPH-MCNC: 2.5 GM/DL
GLUCOSE BLD-MCNC: 143 MG/DL (ref 70–100)
HCT VFR BLD AUTO: 45.3 % (ref 40–52)
HGB BLD-MCNC: 16 G/DL (ref 14–18)
IMM GRANULOCYTES # BLD AUTO: 0.05 10*3/MM3 (ref 0–0.03)
IMM GRANULOCYTES NFR BLD AUTO: 0.4 % (ref 0–5)
LYMPHOCYTES # BLD AUTO: 0.26 10*3/MM3 (ref 0.72–4.86)
LYMPHOCYTES NFR BLD AUTO: 2.3 % (ref 15–45)
MCH RBC QN AUTO: 31.9 PG (ref 28–32)
MCHC RBC AUTO-ENTMCNC: 35.3 G/DL (ref 33–36)
MCV RBC AUTO: 90.2 FL (ref 82–95)
MONOCYTES # BLD AUTO: 0.26 10*3/MM3 (ref 0.19–1.3)
MONOCYTES NFR BLD AUTO: 2.3 % (ref 4–12)
NEUTROPHILS # BLD AUTO: 10.6 10*3/MM3 (ref 1.87–8.4)
NEUTROPHILS NFR BLD AUTO: 94.6 % (ref 39–78)
NRBC BLD AUTO-RTO: 0 /100 WBC (ref 0–0)
PLATELET # BLD AUTO: 202 10*3/MM3 (ref 130–400)
PMV BLD AUTO: 9.5 FL (ref 6–12)
POTASSIUM BLD-SCNC: 4.4 MMOL/L (ref 3.5–5.3)
PROT SERPL-MCNC: 6.7 G/DL (ref 6.3–8.7)
RBC # BLD AUTO: 5.02 10*6/MM3 (ref 4.8–5.9)
SODIUM BLD-SCNC: 137 MMOL/L (ref 135–145)
WBC NRBC COR # BLD: 11.21 10*3/MM3 (ref 4.8–10.8)

## 2019-02-07 PROCEDURE — 86140 C-REACTIVE PROTEIN: CPT | Performed by: INTERNAL MEDICINE

## 2019-02-07 PROCEDURE — 85025 COMPLETE CBC W/AUTO DIFF WBC: CPT | Performed by: INTERNAL MEDICINE

## 2019-02-07 PROCEDURE — 80053 COMPREHEN METABOLIC PANEL: CPT | Performed by: INTERNAL MEDICINE

## 2019-02-07 PROCEDURE — 36415 COLL VENOUS BLD VENIPUNCTURE: CPT

## 2019-02-07 PROCEDURE — 86481 TB AG RESPONSE T-CELL SUSP: CPT | Performed by: INTERNAL MEDICINE

## 2019-02-07 PROCEDURE — 99214 OFFICE O/P EST MOD 30 MIN: CPT | Performed by: INTERNAL MEDICINE

## 2019-02-07 PROCEDURE — 80299 QUANTITATIVE ASSAY DRUG: CPT | Performed by: INTERNAL MEDICINE

## 2019-02-07 RX ORDER — ONDANSETRON HYDROCHLORIDE 8 MG/1
8 TABLET, FILM COATED ORAL EVERY 8 HOURS PRN
Qty: 10 TABLET | Refills: 1 | Status: SHIPPED | OUTPATIENT
Start: 2019-02-07 | End: 2021-07-19 | Stop reason: SDUPTHER

## 2019-02-07 NOTE — PROGRESS NOTES
[facility]astroenterology    Chief Complaint   Patient presents with   • Crohn's Disease       Subjective     HPI    Vinicio Ring is a 68 y.o. male who presents with a chief complaint of Crohn's.    He called this morning.  Last night he started having some abdominal discomfort in the evening.  He felt well yesterday during the day and had a normal bowel movement yesterday morning.  He felt a little bloated.  He was belching last evening.  The pain became uncomfortable.  He took 6 tablets from a Medrol Dosepak.  The pain persisted overnight and thus he started to head to the emergency room this morning.  On the drive to the emergency room with his wife the pain started to subside.  I talked to now he states he is feeling better.  He had no nausea vomiting or fever chills sweats.    Currently he states he still doing well.  He is not eaten anything today.  He is drinking fluids and tolerating well.  He has had a bowel movement this morning now.  He tells me he also had some nausea this morning he took a Zofran and that worked well for him.  He is not that hungry.  He has no abdominal pain at this time.    Prior to last night he was doing well.  He had no prior episodes of abdominal pain.  He had been gaining weight.  He states ever since he got on the Imuran has been getting a little bit of weight and feeling well.    He does tell me he did not get his hepatitis vaccinations as prescribed 6 months ago.  He is getting routine dermatology checks.  He has not had his chest x-ray that was ordered in the fall.  He did get the T spot today with his labs.        Past Medical History:   Diagnosis Date   • Antral cyst 12/14/2017   • Chronic rhinitis 12/14/2017   • Crohn's disease (CMS/HCC)    • Elevated PSA    • GERD (gastroesophageal reflux disease)        Past Surgical History:   Procedure Laterality Date   • CAPSULE ENDOSCOPY N/A 10/19/2016    Procedure: CAPSULE ENDOSCOPY AGILE;  Surgeon: Hector Mooney MD;   Location: [facility] ENDOSCOPY;  Service:    • CHOLECYSTECTOMY     • CHOLECYSTECTOMY     • COLONOSCOPY N/A 10/19/2016    Procedure: COLONOSCOPY WITH ANESTHESIA;  Surgeon: Hector Mooney MD;  Location: [facility] ENDOSCOPY;  Service:    • ENDOSCOPY N/A 10/19/2016    Procedure: ESOPHAGOGASTRODUODENOSCOPY WITH ANESTHESIA;  Surgeon: Hector Mooney MD;  Location: [facility] ENDOSCOPY;  Service:          Current Outpatient Medications:   •  azaTHIOprine (IMURAN) 50 MG tablet, Take 1 tablet by mouth Daily., Disp: 30 tablet, Rfl: 11  •  dicyclomine (BENTYL) 10 MG capsule, Take 1 capsule by mouth 3 (Three) Times a Day As Needed (for abdominal discomfort)., Disp: 90 capsule, Rfl: 6  •  Famotidine (PEPCID AC PO), Take  by mouth Every Night., Disp: , Rfl:   •  fluticasone (FLONASE) 50 MCG/ACT nasal spray, Instill 2 sprays in each nostril once daily, Disp: 16 g, Rfl: 5  •  mesalamine (PENTASA) 500 MG CR capsule, Take 2 capsules by mouth 4 (Four) Times a Day., Disp: 720 capsule, Rfl: 3  •  ondansetron (ZOFRAN) 8 MG tablet, Take 1 tablet by mouth Every 8 (Eight) Hours As Needed for Nausea or Vomiting., Disp: 10 tablet, Rfl: 1  •  Hepatitis B Vac Recombinant 20 MCG/ML injection, Inject 1 mL into the appropriate muscle as directed by prescriber See Admin Instructions., Disp: 3 mL, Rfl: 0    No Known Allergies    Social History     Socioeconomic History   • Marital status:      Spouse name: Not on file   • Number of children: Not on file   • Years of education: Not on file   • Highest education level: Not on file   Social Needs   • Financial resource strain: Not on file   • Food insecurity - worry: Not on file   • Food insecurity - inability: Not on file   • Transportation needs - medical: Not on file   • Transportation needs - non-medical: Not on file   Occupational History   • Not on file   Tobacco Use   • Smoking status: Never Smoker   • Smokeless tobacco: Never Used   Substance and Sexual Activity   • Alcohol use: Yes      Alcohol/week: 8.4 oz     Types: 14 Glasses of wine per week     Comment: daily   • Drug use: No   • Sexual activity: Defer   Other Topics Concern   • Not on file   Social History Narrative   • Not on file       Family History   Problem Relation Age of Onset   • No Known Problems Father    • No Known Problems Mother    • Colon cancer Maternal Aunt    • Colon polyps Paternal Uncle        Review of Systems  General no fever chills or sweats weight stable  Gastrointestinal: Not present- constipation, diarrhea, dysphagia, hematemesis, melena, odynophagia, nausea, vomiting, pyrosis, regurgitation, hematochezia,    Objective     Vitals:    02/07/19 1250   BP: 118/80   Pulse: 84   Temp: 96.2 °F (35.7 °C)   SpO2: 99%       Physical Exam  No acute distress. Vital signs as documented. Skin warm and dry and without overt rashes. EOMI, sclera anicteric.  Neck without JVD or masses. Lungs clear to auscultation bilaterally, no rales. Heart exam notable for regular rhythm, normal sounds and absence of loud murmurs, rubs or gallops. Abdomen is soft, nontender, non distended, normal bowel sounds and without evidence of organomegaly, masses, or abdominal aortic enlargement. Extremities nonedematous, no cyanosis. Neuro alert, moves extremities.        Assessment/Plan   Problem List Items Addressed This Visit        Digestive    Crohn's disease of small intestine without complication (CMS/HCC) - Primary    Overview     Diagnosed in 2016.  Symptoms manifest by intermittent intestinal obstruction.  Prometheus positive for IBD.            Nervous and Auditory    Generalized abdominal pain       Other    Encounter for monitoring immunomodulating therapy    Overview     TPMT enzyme levels are intermediate which can possibly increase risk of toxicity with Imuran at high doses.    T Spot negative June 2017, repeat ordered August 2018  Test x-ray ordered August 2018  Patient has received the Pneumovax, as well as the shingles vaccine.  He did  receive the flu shot for the  season.  He will need repeated next season  He sees dermatology, Dr. Taylor, routinely and was advised to continue and why.  Hepatitis A and B vaccinations ordered 2018                 His symptoms suggest that he may have had a mild partial small bowel obstruction.  At this time he is asymptomatic.  He is only on liquids.  Advised that he continue on his clear liquid diet today.  Tomorrow he can start on low roughage diet I went over what I meant by a low roughage diet.  He will start with some toast on white bread.  He will stick with low roughage over the next 5-7 days.  He is advised to finish out the course of the Medrol Dosepak.  I did renew his prescription for Zofran as the Zofran he had at home was .  He takes on rare occasion.    To further evaluate his current symptoms, we will check a CT enterography and this is ordered.  He has not yet been scheduled.  Schedule he should contact him soon.  When he gets the CT enterography will also get his chest x-ray.  I have reviewed labs that we ordered earlier today which included a CBC which is unremarkable other than a white count of 11 and his CMP is unremarkable other than a glucose of 143.  These slight elevations can be due to the steroids or other acute stress of the illness.  He has no prior history of elevated glucose.  Pending is a Prometheus metabolite drug level for the Imuran.  T spot is also pending    Once I get the metabolite drug level results and a CT enterography, I advised him that I will decide then whether the next step is to pursue colonoscopy or observation or etc.  He expressed understanding.  He agrees to pursue colonoscopy if we decide that is the route to take.  We will let them know the test results.  I will have him come back and see us in the office in 3 months sooner if needed.  I did advise that if he has another attack of abdominal pain this evening that he should go straight to the  emergency room for evaluation.  At that point we can get a emergent CT if needed or other x-rays depending on his presentation.  He agreed to do so.    Lastly, we did talk about the hepatitis vaccinations.  He is going to check with the pharmacy if he can get the prescribed vaccinations at the pharmacy.  If he cannot he will contact us.    Continue ongoing management by primary care provider and other specialists.     Patient's Body mass index is 24.28 kg/m². BMI is within normal parameters. No follow-up required..        EMR Dragon/transcription disclaimer:  Much of this encounter note is electronic transcription/translation of spoken language to printed text.  The electronic translation of spoken language may be erroneous, or at times, nonsensical words or phrases may be inadvertently transcribed.  Although I have reviewed the note for such errors, some may still exist.    Hector Mooney MD  1:31 PM  02/07/19

## 2019-02-07 NOTE — TELEPHONE ENCOUNTER
He called this morning at 5:39 AM with complaints of abdominal pain since yesterday afternoon it comes and goes approximally every minute or 2.  He took some of his steroids on his own.  He was asking what to do.  I referred him to the emergency room. He said he would probably do this.    Elham Lyons MD

## 2019-02-07 NOTE — TELEPHONE ENCOUNTER
He called this morning.  Last night he started having some abdominal discomfort in the evening.  He felt well yesterday during the day and had a normal bowel movement yesterday morning.  He felt a little bloated.  He was belching last evening.  The pain became uncomfortable.  He took 6 tablets from a Medrol Dosepak.  The pain persisted overnight and thus he started to head to the emergency room this morning.  On the drive to the emergency room with his wife the pain started to subside.  I talked to now he states he is feeling better.  He had no nausea vomiting or fever chills sweats.    I advised him to go to the lab to get lab work.  He is to stick with a clear liquid diet today.  I asked him to come to see me in the office today at 1:00.  We will also arrange for CT enterography.    Please get precertification and schedule CT enterography for the patient.  Please place him on my schedule today at 1:00

## 2019-02-08 LAB
TSPOT INTERPRETATION: NORMAL
TSPOT NIL CONTROL INTERPRETATION: NORMAL
TSPOT PANEL A: 0
TSPOT PANEL B: 0
TSPOT POS CONTROL INTERPRETATION: NORMAL

## 2019-02-12 LAB
6-TGN ENTSUB RBC: 328 PMOL/8X 10E8
6MMP ENTSUB RBC: <156 PMOL/8X 10E8

## 2019-02-12 RX ORDER — AZATHIOPRINE 50 MG/1
50 TABLET ORAL DAILY
Qty: 30 TABLET | Refills: 11 | Status: SHIPPED | OUTPATIENT
Start: 2019-02-12 | End: 2019-02-18 | Stop reason: SDUPTHER

## 2019-02-18 ENCOUNTER — HOSPITAL ENCOUNTER (OUTPATIENT)
Dept: CT IMAGING | Facility: HOSPITAL | Age: 69
Discharge: HOME | End: 2019-02-18
Admitting: INTERNAL MEDICINE

## 2019-02-18 DIAGNOSIS — K50.019 CROHN'S DISEASE OF SMALL INTESTINE WITH COMPLICATION (HCC): ICD-10-CM

## 2019-02-18 PROCEDURE — 0 IOPAMIDOL PER 1 ML: Performed by: INTERNAL MEDICINE

## 2019-02-18 PROCEDURE — 74177 CT ABD & PELVIS W/CONTRAST: CPT

## 2019-02-18 RX ADMIN — IOPAMIDOL 150 ML: 755 INJECTION, SOLUTION INTRAVENOUS at 14:13

## 2019-02-20 ENCOUNTER — TELEPHONE (OUTPATIENT)
Dept: GASTROENTEROLOGY | Facility: CLINIC | Age: 69
End: 2019-02-20

## 2019-02-20 RX ORDER — AZATHIOPRINE 50 MG/1
50 TABLET ORAL DAILY
Qty: 30 TABLET | Refills: 11 | Status: SHIPPED | OUTPATIENT
Start: 2019-02-20 | End: 2020-05-18

## 2019-02-20 NOTE — TELEPHONE ENCOUNTER
Pt would like to know if he can have another dose pack sent to [facility] to have on hand if needed.

## 2019-04-16 ENCOUNTER — TELEPHONE (OUTPATIENT)
Dept: GASTROENTEROLOGY | Facility: CLINIC | Age: 69
End: 2019-04-16

## 2019-04-16 NOTE — TELEPHONE ENCOUNTER
Pt left  stating he took medrol dose pack about a week and 1/2 ago when he had a flair and would like to know if we can call in another because he like to have one on hand in case he needs it.

## 2019-05-03 DIAGNOSIS — K50.00 CROHN'S DISEASE OF SMALL INTESTINE WITHOUT COMPLICATION (HCC): Primary | ICD-10-CM

## 2019-05-08 ENCOUNTER — LAB (OUTPATIENT)
Dept: LAB | Facility: HOSPITAL | Age: 69
End: 2019-05-08

## 2019-05-08 DIAGNOSIS — K50.00 CROHN'S DISEASE OF SMALL INTESTINE WITHOUT COMPLICATION (HCC): ICD-10-CM

## 2019-05-08 LAB
ALBUMIN SERPL-MCNC: 4 G/DL (ref 3.5–5)
ALBUMIN/GLOB SERPL: 1.7 G/DL (ref 1.1–2.5)
ALP SERPL-CCNC: 86 U/L (ref 24–120)
ALT SERPL W P-5'-P-CCNC: 37 U/L (ref 0–54)
ANION GAP SERPL CALCULATED.3IONS-SCNC: 6 MMOL/L (ref 4–13)
AST SERPL-CCNC: 30 U/L (ref 7–45)
BASOPHILS # BLD AUTO: 0.03 10*3/MM3 (ref 0–0.2)
BASOPHILS NFR BLD AUTO: 0.4 % (ref 0–2)
BILIRUB SERPL-MCNC: 0.6 MG/DL (ref 0.1–1)
BUN BLD-MCNC: 14 MG/DL (ref 5–21)
BUN/CREAT SERPL: 14.3 (ref 7–25)
CALCIUM SPEC-SCNC: 9 MG/DL (ref 8.4–10.4)
CHLORIDE SERPL-SCNC: 104 MMOL/L (ref 98–110)
CO2 SERPL-SCNC: 28 MMOL/L (ref 24–31)
CREAT BLD-MCNC: 0.98 MG/DL (ref 0.5–1.4)
DEPRECATED RDW RBC AUTO: 41.5 FL (ref 40–54)
EOSINOPHIL # BLD AUTO: 0.08 10*3/MM3 (ref 0–0.7)
EOSINOPHIL NFR BLD AUTO: 1.1 % (ref 0–4)
ERYTHROCYTE [DISTWIDTH] IN BLOOD BY AUTOMATED COUNT: 12.7 % (ref 12–15)
GFR SERPL CREATININE-BSD FRML MDRD: 77 ML/MIN/1.73
GLOBULIN UR ELPH-MCNC: 2.4 GM/DL
GLUCOSE BLD-MCNC: 101 MG/DL (ref 70–100)
HCT VFR BLD AUTO: 40.7 % (ref 40–52)
HGB BLD-MCNC: 14.6 G/DL (ref 14–18)
IMM GRANULOCYTES # BLD AUTO: 0.04 10*3/MM3 (ref 0–0.05)
IMM GRANULOCYTES NFR BLD AUTO: 0.5 % (ref 0–5)
LYMPHOCYTES # BLD AUTO: 0.73 10*3/MM3 (ref 0.72–4.86)
LYMPHOCYTES NFR BLD AUTO: 9.9 % (ref 15–45)
MCH RBC QN AUTO: 32.1 PG (ref 28–32)
MCHC RBC AUTO-ENTMCNC: 35.9 G/DL (ref 33–36)
MCV RBC AUTO: 89.5 FL (ref 82–95)
MONOCYTES # BLD AUTO: 0.43 10*3/MM3 (ref 0.19–1.3)
MONOCYTES NFR BLD AUTO: 5.8 % (ref 4–12)
NEUTROPHILS # BLD AUTO: 6.08 10*3/MM3 (ref 1.87–8.4)
NEUTROPHILS NFR BLD AUTO: 82.3 % (ref 39–78)
NRBC BLD AUTO-RTO: 0 /100 WBC (ref 0–0.2)
PLATELET # BLD AUTO: 186 10*3/MM3 (ref 130–400)
PMV BLD AUTO: 9.3 FL (ref 6–12)
POTASSIUM BLD-SCNC: 4 MMOL/L (ref 3.5–5.3)
PROT SERPL-MCNC: 6.4 G/DL (ref 6.3–8.7)
RBC # BLD AUTO: 4.55 10*6/MM3 (ref 4.8–5.9)
SODIUM BLD-SCNC: 138 MMOL/L (ref 135–145)
WBC NRBC COR # BLD: 7.39 10*3/MM3 (ref 4.8–10.8)

## 2019-05-08 PROCEDURE — 85025 COMPLETE CBC W/AUTO DIFF WBC: CPT | Performed by: INTERNAL MEDICINE

## 2019-05-08 PROCEDURE — 36415 COLL VENOUS BLD VENIPUNCTURE: CPT

## 2019-05-08 PROCEDURE — 80053 COMPREHEN METABOLIC PANEL: CPT | Performed by: INTERNAL MEDICINE

## 2019-05-13 ENCOUNTER — OFFICE VISIT (OUTPATIENT)
Dept: GASTROENTEROLOGY | Facility: CLINIC | Age: 69
End: 2019-05-13

## 2019-05-13 VITALS
WEIGHT: 183 LBS | HEART RATE: 101 BPM | SYSTOLIC BLOOD PRESSURE: 124 MMHG | BODY MASS INDEX: 24.79 KG/M2 | DIASTOLIC BLOOD PRESSURE: 76 MMHG | HEIGHT: 72 IN | OXYGEN SATURATION: 99 % | TEMPERATURE: 95.1 F

## 2019-05-13 DIAGNOSIS — Z51.81 ENCOUNTER FOR MONITORING IMMUNOMODULATING THERAPY: ICD-10-CM

## 2019-05-13 DIAGNOSIS — K50.00 CROHN'S DISEASE OF SMALL INTESTINE WITHOUT COMPLICATION (HCC): Primary | ICD-10-CM

## 2019-05-13 DIAGNOSIS — K21.9 GASTROESOPHAGEAL REFLUX DISEASE WITHOUT ESOPHAGITIS: ICD-10-CM

## 2019-05-13 DIAGNOSIS — Z79.899 ENCOUNTER FOR MONITORING IMMUNOMODULATING THERAPY: ICD-10-CM

## 2019-05-13 PROCEDURE — 99214 OFFICE O/P EST MOD 30 MIN: CPT | Performed by: INTERNAL MEDICINE

## 2019-05-13 NOTE — PROGRESS NOTES
[facility]    Chief Complaint   Patient presents with   • Crohn's Disease       Subjective     HPI    Vinicio Ring is a 68 y.o. male who presents with a chief complaint of Crohn's.    He was here in February when he had an episode of acute abdominal pain lasting overnight.  He presented to the hospital but he came to the office as he was starting to feel better.  We treated him Medrol Dosepak.  He felt better.  He states he had a slight episode and that was at the beginning of April.  He did take a Medrol Dosepak then.  And that he feels good.  He states he started exercising in the beginning of April and is done well since then.  He is put on about 3 pounds.  He is got a good appetite.  He has had no abdominal pain.  Has daily bowel months.  Denies blood in stools.  Continues on Imuran.  We did check his metabolite levels in February and they were within normal therapeutic range.      Past Medical History:   Diagnosis Date   • Antral cyst 12/14/2017   • Chronic rhinitis 12/14/2017   • Crohn's disease (CMS/HCC)    • Elevated PSA    • GERD (gastroesophageal reflux disease)        Past Surgical History:   Procedure Laterality Date   • CAPSULE ENDOSCOPY N/A 10/19/2016    Procedure: CAPSULE ENDOSCOPY AGILE;  Surgeon: Hector Mooney MD;  Location: [facility] ENDOSCOPY;  Service:    • CHOLECYSTECTOMY     • CHOLECYSTECTOMY     • COLONOSCOPY N/A 10/19/2016    Procedure: COLONOSCOPY WITH ANESTHESIA;  Surgeon: Hector Mooney MD;  Location: [facility] ENDOSCOPY;  Service:    • ENDOSCOPY N/A 10/19/2016    Procedure: ESOPHAGOGASTRODUODENOSCOPY WITH ANESTHESIA;  Surgeon: Hector Mooney MD;  Location: [facility] ENDOSCOPY;  Service:          Current Outpatient Medications:   •  azaTHIOprine (IMURAN) 50 MG tablet, Take 1 tablet by mouth Daily., Disp: 30 tablet, Rfl: 11  •  dicyclomine (BENTYL) 10 MG capsule, Take 1 capsule by mouth 3 (Three) Times a Day As Needed (for abdominal discomfort)., Disp: 90  capsule, Rfl: 6  •  Famotidine (PEPCID AC PO), Take  by mouth Every Night., Disp: , Rfl:   •  fluticasone (FLONASE) 50 MCG/ACT nasal spray, Instill 2 sprays in each nostril once daily, Disp: 16 g, Rfl: 5  •  hepatitis B vaccine, recombinant, (ENGERIX-B) 20 MCG/ML injection, Inject 1 mL into the appropriate muscle as directed by prescriber repeat after 1 month and 6 months, Disp: 3 mL, Rfl: 0  •  mesalamine (PENTASA) 500 MG CR capsule, Take 2 capsules by mouth 4 (Four) Times a Day., Disp: 720 capsule, Rfl: 3  •  methylPREDNISolone (MEDROL, MIKE,) 4 MG tablet, Take as directed on package instructions (Patient taking differently: As Needed. Take as directed on package instructions), Disp: 21 tablet, Rfl: 0  •  ondansetron (ZOFRAN) 8 MG tablet, Take 1 tablet by mouth Every 8 (Eight) Hours As Needed for Nausea or Vomiting., Disp: 10 tablet, Rfl: 1    No Known Allergies    Social History     Socioeconomic History   • Marital status:      Spouse name: Not on file   • Number of children: Not on file   • Years of education: Not on file   • Highest education level: Not on file   Tobacco Use   • Smoking status: Never Smoker   • Smokeless tobacco: Never Used   Substance and Sexual Activity   • Alcohol use: Yes     Alcohol/week: 8.4 oz     Types: 14 Glasses of wine per week     Comment: daily   • Drug use: No   • Sexual activity: Defer       Family History   Problem Relation Age of Onset   • No Known Problems Father    • No Known Problems Mother    • Colon cancer Maternal Aunt    • Colon polyps Paternal Uncle        Review of Systems  General no fever chills or sweats weight stable  Gastrointestinal: Not present-abdominal pain, constipation, diarrhea, dysphagia, hematemesis, melena, odynophagia, nausea, vomiting, pyrosis, regurgitation, hematochezia,    Objective     Vitals:    05/13/19 1433   BP: 124/76   Pulse: 101   Temp: 95.1 °F (35.1 °C)   SpO2: 99%       Physical Exam  No acute distress. Vital signs as documented.  Skin warm and dry and without overt rashes. EOMI, sclera anicteric.  Neck without JVD or masses. Lungs clear to auscultation bilaterally, no rales. Heart exam notable for regular rhythm, normal sounds and absence of loud murmurs, rubs or gallops. Abdomen is soft, nontender, non distended, normal bowel sounds and without evidence of organomegaly, masses, or abdominal aortic enlargement. Extremities nonedematous, no cyanosis. Neuro alert, moves extremities.        Assessment/Plan   Problem List Items Addressed This Visit        Digestive    Crohn's disease of small intestine without complication (CMS/HCC) - Primary    Overview     Diagnosed in 2016.  Symptoms manifest by intermittent intestinal obstruction.  Prometheus positive for IBD.         GERD (gastroesophageal reflux disease)       Other    Encounter for monitoring immunomodulating therapy    Overview     TPMT enzyme levels are intermediate which can possibly increase risk of toxicity with Imuran at high doses.    T Spot negative June 2017, 2/2019  Test x-ray ordered August 2018  Patient has received the Pneumovax, as well as the shingles vaccine.  He did receive the flu shot for the 2017/18 season.  He will need repeated next season  He sees dermatology, Dr. Taylor, routinely and was advised to continue and why.  Hepatitis A and B vaccinations ordered August 2018, as of 5/9/19 has had a/b vaccinations and is due for 6 month booster in 3 mos.                  He is doing well now.  We talked about the difference between fibrotic disease and inflammation.  He is asymptomatic.  He has a therapeutic level of Imuran.  We will continue with the same therapy.  I reviewed his labs.  He will need repeat labs in 3 months.  We will see him back in the office in 6 months sooner if needed.  I encouraged him to continue exercising.    Continue ongoing management by primary care provider and other specialists.     Patient's Body mass index is 24.82 kg/m². BMI is within normal  parameters. No follow-up required..        EMR Dragon/transcription disclaimer:  Much of this encounter note is electronic transcription/translation of spoken language to printed text.  The electronic translation of spoken language may be erroneous, or at times, nonsensical words or phrases may be inadvertently transcribed.  Although I have reviewed the note for such errors, some may still exist.    Hector Mooney MD  3:55 PM  05/13/19

## 2019-06-27 ENCOUNTER — OFFICE VISIT (OUTPATIENT)
Dept: INTERNAL MEDICINE | Facility: CLINIC | Age: 69
End: 2019-06-27

## 2019-06-27 VITALS
OXYGEN SATURATION: 98 % | HEART RATE: 66 BPM | WEIGHT: 179.38 LBS | RESPIRATION RATE: 12 BRPM | SYSTOLIC BLOOD PRESSURE: 158 MMHG | DIASTOLIC BLOOD PRESSURE: 98 MMHG | TEMPERATURE: 97.9 F | HEIGHT: 72 IN | BODY MASS INDEX: 24.3 KG/M2

## 2019-06-27 DIAGNOSIS — M99.04 SOMATIC DYSFUNCTION OF SPINE, SACRAL: ICD-10-CM

## 2019-06-27 DIAGNOSIS — M99.03 SOMATIC DYSFUNCTION OF SPINE, LUMBAR: ICD-10-CM

## 2019-06-27 DIAGNOSIS — M99.05 SOMATIC DYSFUNCTION OF PELVIC REGION: ICD-10-CM

## 2019-06-27 DIAGNOSIS — M99.06 SOMATIC DYSFUNCTION OF BOTH LOWER EXTREMITIES: ICD-10-CM

## 2019-06-27 DIAGNOSIS — M25.551 RIGHT HIP PAIN: Primary | ICD-10-CM

## 2019-06-27 PROCEDURE — 99203 OFFICE O/P NEW LOW 30 MIN: CPT | Performed by: FAMILY MEDICINE

## 2019-06-27 PROCEDURE — 98926 OSTEOPATH MANJ 3-4 REGIONS: CPT | Performed by: FAMILY MEDICINE

## 2019-06-27 NOTE — PROGRESS NOTES
CC:   Chief Complaint   Patient presents with   • Establish Care     Patient reports right hip pain for about a month after the car seat hit his hip and a long car ride   • Hip Pain       History:  Vinicio Ring is a 68 y.o. male who presents today for evaluation of the above problems.      Right hip pain for about 1 month duration after sitting in a long car ride in a car seat hit his hip. Pain currently along R SI joint worse with ambulation after sitting as well as forward bending and hip flexion. Radiates down R lateral leg to R lateral knee. No numbness or tingling. Pain now is 3-4/10.     Trauma Hx: none, Crohn's disease well controlled per his report, has been having chronic L foot pain for about a year without an injury that he can remember.     ROS:  Review of Systems   Musculoskeletal: Positive for arthralgias and myalgias.   All other systems reviewed and are negative.      No Known Allergies  Past Medical History:   Diagnosis Date   • Antral cyst 12/14/2017   • Chronic rhinitis 12/14/2017   • Crohn's disease (CMS/HCC)    • Elevated PSA    • GERD (gastroesophageal reflux disease)      Past Surgical History:   Procedure Laterality Date   • CAPSULE ENDOSCOPY N/A 10/19/2016    Procedure: CAPSULE ENDOSCOPY AGILE;  Surgeon: Hector Mooney MD;  Location: [facility] ENDOSCOPY;  Service:    • CHOLECYSTECTOMY     • CHOLECYSTECTOMY     • COLONOSCOPY N/A 10/19/2016    Procedure: COLONOSCOPY WITH ANESTHESIA;  Surgeon: Hector Mooney MD;  Location: [facility] ENDOSCOPY;  Service:    • ENDOSCOPY N/A 10/19/2016    Procedure: ESOPHAGOGASTRODUODENOSCOPY WITH ANESTHESIA;  Surgeon: Hector Mooney MD;  Location: [facility] ENDOSCOPY;  Service:      Family History   Problem Relation Age of Onset   • No Known Problems Father    • No Known Problems Mother    • Colon cancer Maternal Aunt    • Colon polyps Paternal Uncle    • Heart disease Maternal Grandfather    • Heart disease Paternal Grandfather       reports that he has  "never smoked. He has never used smokeless tobacco. He reports that he drinks about 8.4 oz of alcohol per week. He reports that he does not use drugs.      Current Outpatient Medications:   •  azaTHIOprine (IMURAN) 50 MG tablet, Take 1 tablet by mouth Daily., Disp: 30 tablet, Rfl: 11  •  Famotidine (PEPCID AC PO), Take  by mouth Every Night., Disp: , Rfl:   •  fluticasone (FLONASE) 50 MCG/ACT nasal spray, Instill 2 sprays in each nostril once daily, Disp: 16 g, Rfl: 5  •  mesalamine (PENTASA) 500 MG CR capsule, Take 2 capsules by mouth 4 (Four) Times a Day., Disp: 720 capsule, Rfl: 3  •  dicyclomine (BENTYL) 10 MG capsule, Take 1 capsule by mouth 3 (Three) Times a Day As Needed (for abdominal discomfort)., Disp: 90 capsule, Rfl: 6  •  ondansetron (ZOFRAN) 8 MG tablet, Take 1 tablet by mouth Every 8 (Eight) Hours As Needed for Nausea or Vomiting., Disp: 10 tablet, Rfl: 1    OBJECTIVE:  /98 (BP Location: Left arm, Patient Position: Sitting, Cuff Size: Adult)   Pulse 66   Temp 97.9 °F (36.6 °C) (Temporal)   Resp 12   Ht 182.9 cm (72\")   Wt 81.4 kg (179 lb 6 oz)   SpO2 98%   BMI 24.33 kg/m²    Physical Exam   Constitutional: He is oriented to person, place, and time. No distress.   HENT:   Head: Normocephalic and atraumatic.   Nose: Nose normal.   Eyes: Conjunctivae are normal. Right eye exhibits no discharge. Left eye exhibits no discharge. No scleral icterus.   Neck: No tracheal deviation present.   Pulmonary/Chest: Effort normal.   Musculoskeletal:   Posterior hip pain with external rotation  Normal flexion, FADIR test  Pain to palpation of left lateral cuboid without crepitus or edema   Neurological: He is alert and oriented to person, place, and time. He exhibits normal muscle tone.   L2-S1 intact sensation and normal strength bilaterally   Skin: Skin is warm and dry. He is not diaphoretic. No pallor.   Psychiatric: He has a normal mood and affect. His behavior is normal. Judgment and thought content " normal.   Nursing note and vitals reviewed.       Osteopathic Structural Exam  Procedure Note for Osteopathic Manipulative Treatment    Pre-procedure diagnoses: Somatic dysfunctions as listed below.  Consent: Oral consent given for Osteopathic Treatment  Post-procedure diagnoses: same  Complications of procedure: none, patient tolerated procedure well    The evaluation including the history, physical exam and the management decisions, indicate than an appropriate intervention on this date of service is osteopathic manipulative treatment. Oral permission for the osteopathic procedure was obtained. The following treatment methods and the responses for each body region are listed below.        Region Somatic Dysfunction Severity OMT technique Response      Lumbar R QL spasm Moderate Balanced ligamentous tension Improved      Sacral R on L backward torsion severe Balanced ligamentous tension Improved   Pelvic R anterior rotation Moderate Balanced ligamentous tension Improved      Lower Extremities  R psoas spasm  R internal hip rotation  R TFL spasm  L cuboid fascial compression  severe Balanced ligamentous tension Improved    '    Assessment/Plan     Diagnosis Plan   1. Right hip pain     2. Somatic dysfunction of spine, lumbar     3. Somatic dysfunction of spine, sacral     4. Somatic dysfunction of pelvic region     5. Somatic dysfunction of both lower extremities     Right posterior hip and SI joint pain with nonphysiologic somatic dysfunctions with intact neuro exam.  Doubt sciatica presentation or disc herniation at this point  -OMT to balance autonomic tone, improve fascial symmetry and respiratory/circulatory/lymphatic compliance  -f/u PRN  -consider PT, declines muscle relaxer    An After Visit Summary was printed and given to the patient at discharge.  Return if symptoms worsen or fail to improve.         Alexey Vasquez D.O.  [facility]  Osteopathic Neuromusculoskeletal Medicine  6/27/2019

## 2019-06-27 NOTE — PATIENT INSTRUCTIONS
What is Osteopathic Medicine  Osteopathic medicine provides all of the benefits of modern medicine including prescription drugs, surgery, and the use of technology to diagnose disease and evaluate injury. It also offers the added benefit of hands-on diagnosis and treatment through a system of therapy known as osteopathic manipulative medicine. Osteopathic medicine emphasizes helping each person achieve a high level of wellness by focusing on health promotion and disease prevention. ([email or web address])     What is a DO  Doctor's of Osteopathy (DO) are fully trained physicians, capable of practicing the entire scope of medicine. In addition to conventional medical practice, DO’s are trained to use their hands to palpate (feel) tissue function and dysfunction. Gentle manipulative techniques are applied, restoring optimal function (motion).     4 Principles define Osteopathic Medicine  • The body is a fully integrated being of body, mind and spirit  • The body is capable of self-regulation, self-healing, and health maintenance  • Structure and function are interrelated  • Rational treatment is based upon an understanding of the basic principles of body unity, self-regulation, and the relationship of structure and function     Osteopathic Manipulative Medicine (OMM)   OMM encompasses a wide range of techniques addressing problems in joints, ligaments, muscles, tendons, and fascia (tissue surrounding muscles and other organs) that may cause pain or interfere with the body’s function. When there are restrictions within the structure of the body it does not function properly, often times causing pain. Using different techniques (listed below) the restrictions in the body are relieved so the body can function at optimal health. Patients often experience a sense of deep relaxation, tingling, fluid flows and relief of pain. These changes may be experienced immediately as they occur or later after the treatment. OMM may be referred to  as Osteopathic Manipulative Treatment (OMT).     Common Treatment Modalities  Osteopathy in the Cranial Field- a system of treatment that utilizes the intrinsic motion of the cranial and neurological system to treat the whole body     Myofascial Release - used to treat restrictions of muscle and fascia     Counterstrain - focused on specific tender points on the body that are held in a position of comfort for 90 seconds, after which the tenderness is relieved     Muscle Energy - uses the relaxation after a muscle is contracted to stretch muscles and increase range of motion     Balanced Ligamentous Tension - ligaments or joints are placed into a state of balanced until the tension is relieved     Facilitated Positional Release - patient’s spine is placed at neutral position while the isolated segment for treatment is placed at ease. Compression or traction is then added to release the tension in the muscle, fascia, and/or joints     High Velocity Low Amplitude (HVLA)- use of fast, short thrusts through restricted joints; a technique with which most people are familiar (also known as the “cracking” or “popping” technique)     Who would benefit  Osteopathy treats the patient, not the disease. Our intention is to find and restore health as well as structural integrity and fluid continuity.      Some of the problems that typically respond to osteopathic treatment:  · SOMATIC PAIN  · Back, neck, and joint pain  · Sciatica  · Headaches/Migraines  · Temporal Mandibular Joint Dysfunction (TMJ)     · TRAUMATIC INJURIES  · Head trauma  · Post Concussion Syndrome  · Overuse Syndromes  · Whiplash Syndromes     · CHRONIC CONDITIONS UNRESPONSIVE TO CONVENTIAL TREATMENT  · Neurologic disorders  · Gastrointestinal disorders  · Genitourinary disorders  · Respiratory Disorders     · WOMEN DURING PREGNANCY can be made more comfortable, their labor and delivery eased considerably by providing freedom to the ligamentous support of the  uterus and pelvis.     ADDITIONAL RESOURCES  www.osteopathic.org  www.osteodoc.com  http://www.do-[employer].com/aboutosteopathy/research/ (Research articles)  Book: Dr. Allison’s Touch of Life by Torsten Allison DO     Information gathered from osteodoc.[employer],  aacom.org, A Brief Guide to Osteopathic Medicine.

## 2019-07-11 ENCOUNTER — OFFICE VISIT (OUTPATIENT)
Dept: INTERNAL MEDICINE | Facility: CLINIC | Age: 69
End: 2019-07-11

## 2019-07-11 VITALS
HEIGHT: 72 IN | TEMPERATURE: 97.8 F | WEIGHT: 181.44 LBS | DIASTOLIC BLOOD PRESSURE: 86 MMHG | SYSTOLIC BLOOD PRESSURE: 126 MMHG | RESPIRATION RATE: 12 BRPM | OXYGEN SATURATION: 98 % | BODY MASS INDEX: 24.58 KG/M2 | HEART RATE: 76 BPM

## 2019-07-11 DIAGNOSIS — M99.06 SOMATIC DYSFUNCTION OF BOTH LOWER EXTREMITIES: ICD-10-CM

## 2019-07-11 DIAGNOSIS — M25.551 RIGHT HIP PAIN: Primary | ICD-10-CM

## 2019-07-11 DIAGNOSIS — M99.05 SOMATIC DYSFUNCTION OF PELVIC REGION: ICD-10-CM

## 2019-07-11 DIAGNOSIS — M99.04 SOMATIC DYSFUNCTION OF SPINE, SACRAL: ICD-10-CM

## 2019-07-11 DIAGNOSIS — M79.18 RIGHT BUTTOCK PAIN: ICD-10-CM

## 2019-07-11 PROCEDURE — 98926 OSTEOPATH MANJ 3-4 REGIONS: CPT | Performed by: FAMILY MEDICINE

## 2019-07-11 PROCEDURE — 99214 OFFICE O/P EST MOD 30 MIN: CPT | Performed by: FAMILY MEDICINE

## 2019-07-11 RX ORDER — BACLOFEN 10 MG/1
10 TABLET ORAL NIGHTLY PRN
Qty: 20 TABLET | Refills: 1 | Status: SHIPPED | OUTPATIENT
Start: 2019-07-11 | End: 2020-09-01

## 2019-07-11 NOTE — PROGRESS NOTES
"CC:   Chief Complaint   Patient presents with   • Hip Pain   • Back Pain       History:  Vinicio Ring is a 68 y.o. male who presents today for follow-up for evaluation of the above:    Still having R buttock and SI joint pain about the same from last visit, also hurts hip. Worse with hip flexion with knee extension, worse with going upstairs. Sleeping at night is difficult due to positioning. Is able to walk, unsure if pain was better with OMT    ROS:  Review of Systems   Constitutional: Activity change: hard to tie R shoe.   Musculoskeletal: Positive for arthralgias.   Psychiatric/Behavioral: Positive for sleep disturbance.       Mr. Ring  reports that he has never smoked. He has never used smokeless tobacco. He reports that he drinks about 8.4 oz of alcohol per week. He reports that he does not use drugs.      Current Outpatient Medications:   •  azaTHIOprine (IMURAN) 50 MG tablet, Take 1 tablet by mouth Daily., Disp: 30 tablet, Rfl: 11  •  Famotidine (PEPCID AC PO), Take  by mouth Every Night., Disp: , Rfl:   •  fluticasone (FLONASE) 50 MCG/ACT nasal spray, Instill 2 sprays in each nostril once daily, Disp: 16 g, Rfl: 5  •  mesalamine (PENTASA) 500 MG CR capsule, Take 2 capsules by mouth 4 (Four) Times a Day., Disp: 720 capsule, Rfl: 3  •  baclofen (LIORESAL) 10 MG tablet, Take 1 tablet by mouth At Night As Needed for Muscle Spasms., Disp: 20 tablet, Rfl: 1  •  dicyclomine (BENTYL) 10 MG capsule, Take 1 capsule by mouth 3 (Three) Times a Day As Needed (for abdominal discomfort)., Disp: 90 capsule, Rfl: 6  •  ondansetron (ZOFRAN) 8 MG tablet, Take 1 tablet by mouth Every 8 (Eight) Hours As Needed for Nausea or Vomiting., Disp: 10 tablet, Rfl: 1    OBJECTIVE:  /86 (BP Location: Left arm, Patient Position: Sitting, Cuff Size: Adult)   Pulse 76   Temp 97.8 °F (36.6 °C) (Temporal)   Resp 12   Ht 182.9 cm (72\")   Wt 82.3 kg (181 lb 7 oz)   SpO2 98%   BMI 24.61 kg/m²    Physical Exam "   Constitutional: He is oriented to person, place, and time. No distress.   HENT:   Head: Normocephalic and atraumatic.   Nose: Nose normal.   Eyes: Conjunctivae are normal. Right eye exhibits no discharge. Left eye exhibits no discharge. No scleral icterus.   Neck: No tracheal deviation present.   Pulmonary/Chest: Effort normal.   Musculoskeletal:   Limited knee extension with hip flexed 90 degrees while sitting secondary to pain on the right   Neurological: He is alert and oriented to person, place, and time.   Skin: Skin is warm and dry. He is not diaphoretic. No pallor.   Psychiatric: He has a normal mood and affect. His behavior is normal. Judgment and thought content normal.   Nursing note and vitals reviewed.    Osteopathic Structural Exam  Procedure Note for Osteopathic Manipulative Treatment    Pre-procedure diagnoses: Somatic dysfunctions as listed below.  Consent: Oral consent given for Osteopathic Treatment  Post-procedure diagnoses: same  Complications of procedure: none, patient tolerated procedure well    The evaluation including the history, physical exam and the management decisions, indicate than an appropriate intervention on this date of service is osteopathic manipulative treatment. Oral permission for the osteopathic procedure was obtained. The following treatment methods and the responses for each body region are listed below.        Region Somatic Dysfunction Severity OMT technique Response      Sacral R unilateral flexion Moderate Balanced ligamentous tension Improved   Pelvic R anterior rotation Moderate Balanced ligamentous tension Improved      Lower Extremities  R posterior hip capsule fascial restriction  R femur externally rotated  R TFL spasm  R adductor spasm  severe Balanced ligamentous tension Improved          Assessment/Plan     Diagnosis Plan   1. Right hip pain  baclofen (LIORESAL) 10 MG tablet    Ambulatory Referral to Physical Therapy Evaluate and treat   2. Right buttock  pain     3. Somatic dysfunction of pelvic region     4. Somatic dysfunction of both lower extremities     5. Somatic dysfunction of spine, sacral     -Poor improvement from last visit however structural exam has less severity of dysfunction with shifting fascial pattern that is still nonphysiologic but less severe overall  -OMT to balance autonomic tone, improve fascial symmetry and respiratory/circulatory/lymphatic compliance  -PT referral  -Baclofen QHS PRN    An After Visit Summary was printed and given to the patient at discharge.  Return if symptoms worsen or fail to improve. Sooner if problems arise.         Alexey Vasquez D.O.  Family Medicine  Osteopathic Neuromusculoskeletal Medicine

## 2019-07-15 ENCOUNTER — HOSPITAL ENCOUNTER (OUTPATIENT)
Dept: PHYSICAL THERAPY | Facility: HOSPITAL | Age: 69
Setting detail: THERAPIES SERIES
Discharge: HOME | End: 2019-07-15

## 2019-07-15 DIAGNOSIS — M79.651 PAIN OF RIGHT LATERAL UPPER THIGH: ICD-10-CM

## 2019-07-15 DIAGNOSIS — M25.551 PAIN OF RIGHT HIP: Primary | ICD-10-CM

## 2019-07-15 DIAGNOSIS — M54.50 ACUTE RIGHT-SIDED LOW BACK PAIN WITHOUT SCIATICA: ICD-10-CM

## 2019-07-15 PROCEDURE — 97162 PT EVAL MOD COMPLEX 30 MIN: CPT | Performed by: PHYSICAL THERAPIST

## 2019-07-15 PROCEDURE — 97140 MANUAL THERAPY 1/> REGIONS: CPT | Performed by: PHYSICAL THERAPIST
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
## 2019-07-18 ENCOUNTER — HOSPITAL ENCOUNTER (OUTPATIENT)
Dept: PHYSICAL THERAPY | Facility: HOSPITAL | Age: 69
Setting detail: THERAPIES SERIES
Discharge: HOME | End: 2019-07-18

## 2019-07-18 DIAGNOSIS — M25.551 PAIN OF RIGHT HIP: Primary | ICD-10-CM

## 2019-07-18 DIAGNOSIS — M54.50 ACUTE RIGHT-SIDED LOW BACK PAIN WITHOUT SCIATICA: ICD-10-CM

## 2019-07-18 DIAGNOSIS — M79.651 PAIN OF RIGHT LATERAL UPPER THIGH: ICD-10-CM

## 2019-07-18 PROCEDURE — 97140 MANUAL THERAPY 1/> REGIONS: CPT | Performed by: PHYSICAL THERAPIST

## 2019-07-18 PROCEDURE — 97110 THERAPEUTIC EXERCISES: CPT | Performed by: PHYSICAL THERAPIST

## 2019-07-22 ENCOUNTER — HOSPITAL ENCOUNTER (OUTPATIENT)
Dept: PHYSICAL THERAPY | Facility: HOSPITAL | Age: 69
Setting detail: THERAPIES SERIES
Discharge: HOME | End: 2019-07-22

## 2019-07-22 DIAGNOSIS — M79.651 PAIN OF RIGHT LATERAL UPPER THIGH: ICD-10-CM

## 2019-07-22 DIAGNOSIS — M25.551 PAIN OF RIGHT HIP: Primary | ICD-10-CM

## 2019-07-22 DIAGNOSIS — M54.50 ACUTE RIGHT-SIDED LOW BACK PAIN WITHOUT SCIATICA: ICD-10-CM

## 2019-07-22 PROCEDURE — 97110 THERAPEUTIC EXERCISES: CPT | Performed by: PHYSICAL THERAPIST

## 2019-07-22 PROCEDURE — 97140 MANUAL THERAPY 1/> REGIONS: CPT | Performed by: PHYSICAL THERAPIST

## 2019-07-22 NOTE — THERAPY TREATMENT NOTE
Outpatient Physical Therapy Ortho Treatment Note  [facility]     Patient Name: Vinicio Ring  : 1950  MRN: [id number]  Today's Date: 2019      Visit Date: 2019    Visit Dx:    ICD-10-CM ICD-9-CM   1. Pain of right hip M25.551 719.45   2. Acute right-sided low back pain without sciatica M54.5 724.2   3. Pain of right lateral upper thigh M79.651 729.5       Patient Active Problem List   Diagnosis   • Enlarged prostate   • Frequency of urination   • Small bowel obstruction (CMS/HCC)   • BPH (benign prostatic hypertrophy)   • Crohn's disease of small intestine without complication (CMS/HCC)   • Nausea   • Acute superficial gastritis without hemorrhage   • Crohn's disease of small intestine with intestinal obstruction (CMS/HCC)   • Elevated PSA   • Hypogonadism, male   • Elevated prostate specific antigen (PSA)   • Antral cyst   • Chronic rhinitis   • Encounter for monitoring immunomodulating therapy   • Chronic maxillary sinusitis   • Generalized abdominal pain   • Nasal polyp, benign   • GERD (gastroesophageal reflux disease)        Past Medical History:   Diagnosis Date   • Antral cyst 2017   • Chronic rhinitis 2017   • Crohn's disease (CMS/HCC)    • Elevated PSA    • GERD (gastroesophageal reflux disease)         Past Surgical History:   Procedure Laterality Date   • CAPSULE ENDOSCOPY N/A 10/19/2016    Procedure: CAPSULE ENDOSCOPY AGILE;  Surgeon: Hector Mooney MD;  Location: [facility];  Service:    • CHOLECYSTECTOMY     • CHOLECYSTECTOMY     • COLONOSCOPY N/A 10/19/2016    Procedure: COLONOSCOPY WITH ANESTHESIA;  Surgeon: Hector Mooney MD;  Location: [facility] ENDOSCOPY;  Service:    • ENDOSCOPY N/A 10/19/2016    Procedure: ESOPHAGOGASTRODUODENOSCOPY WITH ANESTHESIA;  Surgeon: Hector Mooney MD;  Location: [facility] ENDOSCOPY;  Service:                        PT Assessment/Plan     Row Name 19 2443          PT Assessment    Assessment Comments  Mr. Ring  demonstrates good improvement in pain reduction, function and ability to maintain symmetrical ASIS/PSIS.  Tight gastroc soleus musculature was observed bilaterally during mini-squats and addressed through stretches.  -KR        PT Plan    PT Plan Comments  Will continue to advance core strengthening and address shortened musculature and fibrosis R IT band.  -KR       User Key  (r) = Recorded By, (t) = Taken By, (c) = Cosigned By    Initials Name Provider Type    Angeles Shelton, PT DPT Physical Therapist            Exercises     Row Name 07/22/19 1350 07/22/19 1300          Subjective Comments    Subjective Comments  Patient reports he did much beetter than expected on his golf trip.  He relates pain remains most prominent in R SI joint worse with prolonged sitting and standing. He denies low back pain and relates R lateral thigh remains tender and painful if he overextends the right leg.   -KR  --        Subjective Pain    Able to rate subjective pain?  yes  -KR  --     Pre-Treatment Pain Level  2  -KR  --     Post-Treatment Pain Level  1  -KR  --        Total Minutes    80447 - PT Therapeutic Exercise Minutes  25  -KR  --     50717 - PT Manual Therapy Minutes  15  -KR  --  -KR        Exercise 1    Exercise Name 1  Supine SLR w/TA activation  -KR  --     Cueing 1  Verbal;Tactile  -KR  --     Sets 1  1  -KR  --     Reps 1  10  -KR  --        Exercise 2    Exercise Name 2  Josh Hamstring stretch  -KR  --     Cueing 2  Verbal  -KR  --     Sets 2  1  -KR  --     Reps 2  3  -KR  --     Time 2  30 sec hold  -KR  --     Additional Comments  R -32 deg  -KR  --        Exercise 3    Exercise Name 3  L Plantar fascia stretch  -KR  --     Cueing 3  Verbal;Demo  -KR  --     Sets 3  1  -KR  --     Reps 3  3  -KR  --     Time 3  30 sec hold  -KR  --        Exercise 4    Exercise Name 4  Gastroc stretch  -KR  --     Cueing 4  Verbal;Tactile;Demo  -KR  --     Sets 4  1  -KR  --     Reps 4  3  -KR  --     Time 4  30 sec hold  -KR  --         Exercise 5    Exercise Name 5  Soleus stretch  -KR  --     Cueing 5  Verbal;Tactile;Demo  -KR  --     Sets 5  1  -KR  --     Reps 5  3  -KR  --     Time 5  30 sec hold  -KR  --        Exercise 6    Exercise Name 6  Mini-squats w/ TA activation  -KR  --       User Key  (r) = Recorded By, (t) = Taken By, (c) = Cosigned By    Initials Name Provider Type    Angeles Shelton, PT DPT Physical Therapist                      Manual Rx (last 36 hours)      Manual Treatments     Row Name 07/22/19 1350 07/22/19 1300          Total Minutes    71416 - PT Manual Therapy Minutes  15  -KR  --  -KR        Manual Rx 1    Manual Rx 1 Location  --  Right IT band and hamstrings  -KR     Manual Rx 1 Type  --  IASTM w/black roller  -KR     Manual Rx 1 Duration  --  15  -KR       User Key  (r) = Recorded By, (t) = Taken By, (c) = Cosigned By    Initials Name Provider Type    Angeles Shelton, PT DPT Physical Therapist          PT OP Goals     Row Name 07/22/19 1350          PT Short Term Goals    STG Date to Achieve  08/05/19  -KR     STG 1  Patient will begin a home exercise program to accelerate the recovery process  -KR     STG 1 Progress  Met  -KR     STG 2  Patient will be able to maintain correction of sacral torsion  -KR     STG 2 Progress  Met  -KR     STG 3  Patient will have an increase in hamstring length by 20 degrees on R  -KR     STG 3 Progress  Progressing  -KR     STG 3 Progress Comments  -32 deg today  -KR        Long Term Goals    LTG Date to Achieve  08/26/19  -KR     LTG 1  Patient will have resolution of low back, SI joint and lateral thigh pain  -KR     LTG 1 Progress  Progressing  -KR     LTG 2  Patient will be able to tie right shoe and perform all self-care without modification or exacerbation of symptoms  -KR     LTG 2 Progress  New  -KR     LTG 3  Patient will be able to perform TA abdominous activation while performing SLR in supine  -KR     LTG 3 Progress  New  -KR     LTG 4  Patient will be able to climb  stairs with stabilization of core, SI joint and hips  -     LTG 4 Progress  New  -KR     LTG 5  Patient will be instructed in long term exercise program to continue to make gains following discharge from therapy  -NELLA     LTG 5 Progress  New  -KR        Time Calculation    PT Goal Re-Cert Due Date  08/14/19  -       User Key  (r) = Recorded By, (t) = Taken By, (c) = Cosigned By    Initials Name Provider Type    Angeles Shelton, PT DPT Physical Therapist          Therapy Education  Education Details: Added plantar fascia, gastroc and soleus stretches; SLR w/TA activation  Given: HEP, Symptoms/condition management, Pain management  Program: Progressed  How Provided: Verbal, Demonstration, Written  Provided to: Patient  Level of Understanding: Verbalized, Demonstrated              Time Calculation:   Start Time: 1350  Stop Time: 1430  Time Calculation (min): 40 min  Total Timed Code Minutes- PT: 40 minute(s)  Therapy Charges for Today     Code Description Service Date Service Provider Modifiers Qty    53777862276  PT THER PROC EA 15 MIN 7/22/2019 Angeles Chin, PT DPT GP 2    [id number]  PT MANUAL THERAPY EA 15 MIN 7/22/2019 Angeles Chin, PT DPT GP 1                    Angeles Chin, PT DPT  7/22/2019

## 2019-07-29 ENCOUNTER — HOSPITAL ENCOUNTER (OUTPATIENT)
Dept: PHYSICAL THERAPY | Facility: HOSPITAL | Age: 69
Setting detail: THERAPIES SERIES
Discharge: HOME | End: 2019-07-29

## 2019-07-29 DIAGNOSIS — M79.651 PAIN OF RIGHT LATERAL UPPER THIGH: ICD-10-CM

## 2019-07-29 DIAGNOSIS — M54.50 ACUTE RIGHT-SIDED LOW BACK PAIN WITHOUT SCIATICA: ICD-10-CM

## 2019-07-29 DIAGNOSIS — M25.551 PAIN OF RIGHT HIP: Primary | ICD-10-CM

## 2019-07-29 PROCEDURE — 97110 THERAPEUTIC EXERCISES: CPT | Performed by: PHYSICAL THERAPIST

## 2019-07-29 PROCEDURE — 97140 MANUAL THERAPY 1/> REGIONS: CPT | Performed by: PHYSICAL THERAPIST

## 2019-07-29 NOTE — THERAPY TREATMENT NOTE
Outpatient Physical Therapy Ortho Treatment Note  [facility]     Patient Name: Vinicio Ring  : 1950  MRN: [id number]  Today's Date: 2019      Visit Date: 2019    Visit Dx:    ICD-10-CM ICD-9-CM   1. Pain of right hip M25.551 719.45   2. Acute right-sided low back pain without sciatica M54.5 724.2   3. Pain of right lateral upper thigh M79.651 729.5       Patient Active Problem List   Diagnosis   • Enlarged prostate   • Frequency of urination   • Small bowel obstruction (CMS/HCC)   • BPH (benign prostatic hypertrophy)   • Crohn's disease of small intestine without complication (CMS/HCC)   • Nausea   • Acute superficial gastritis without hemorrhage   • Crohn's disease of small intestine with intestinal obstruction (CMS/HCC)   • Elevated PSA   • Hypogonadism, male   • Elevated prostate specific antigen (PSA)   • Antral cyst   • Chronic rhinitis   • Encounter for monitoring immunomodulating therapy   • Chronic maxillary sinusitis   • Generalized abdominal pain   • Nasal polyp, benign   • GERD (gastroesophageal reflux disease)        Past Medical History:   Diagnosis Date   • Antral cyst 2017   • Chronic rhinitis 2017   • Crohn's disease (CMS/HCC)    • Elevated PSA    • GERD (gastroesophageal reflux disease)         Past Surgical History:   Procedure Laterality Date   • CAPSULE ENDOSCOPY N/A 10/19/2016    Procedure: CAPSULE ENDOSCOPY AGILE;  Surgeon: Hector Mooney MD;  Location: [facility];  Service:    • CHOLECYSTECTOMY     • CHOLECYSTECTOMY     • COLONOSCOPY N/A 10/19/2016    Procedure: COLONOSCOPY WITH ANESTHESIA;  Surgeon: Hector Mooney MD;  Location: [facility] ENDOSCOPY;  Service:    • ENDOSCOPY N/A 10/19/2016    Procedure: ESOPHAGOGASTRODUODENOSCOPY WITH ANESTHESIA;  Surgeon: Hector Mooney MD;  Location: [facility] ENDOSCOPY;  Service:                        PT Assessment/Plan     Row Name 19 9385          PT Assessment    Assessment Comments  Mr. Leslie  presents with R anterior/L posterior inomminate upon arrival today which was corrected with muscle energy techique.  He demonstrates a good understanding of principles taught and good body mechanics to avoid extreme assymetrical movement of LE's. He is eager to return to his previous exercise program and was instructed how he may do so safely.    -KR        PT Plan    PT Plan Comments  He will return to clinic in 2 weeks unless he has a problem or progression of pain that he does not know how to improve.  -KR       User Key  (r) = Recorded By, (t) = Taken By, (c) = Cosigned By    Initials Name Provider Type    Angelse Shelton, PT DPT Physical Therapist            Exercises     Row Name 07/29/19 1105             Subjective Comments    Subjective Comments  Patient reports he had minimal pain after PT visit on Monday until Saturday at which time pain increased to a 5-6/10 in right SI joint region and right lateral thigh.  He adds that he went out on a pontoon boot and tried to pull an anchor out of the mud.  -KR         Subjective Pain    Able to rate subjective pain?  yes  -KR      Pre-Treatment Pain Level  3  -KR      Post-Treatment Pain Level  1  -KR         Total Minutes    56895 - PT Therapeutic Exercise Minutes  15  -KR      43881 - PT Manual Therapy Minutes  25  -KR         Exercise 1    Exercise Name 1  Instructed in how to return to gym and walking program safely; review of previous HEP  -KR        User Key  (r) = Recorded By, (t) = Taken By, (c) = Cosigned By    Initials Name Provider Type    Angeles Shelton, PT DPT Physical Therapist                      Manual Rx (last 36 hours)      Manual Treatments     Row Name 07/29/19 1105             Total Minutes    55670 - PT Manual Therapy Minutes  25  -KR         Manual Rx 1    Manual Rx 1 Location  R low back, IT band and hamstrings  -KR      Manual Rx 1 Type  IASTM black roller with ridges  -KR      Manual Rx 1 Grade  Mod OP  -KR      Manual Rx 1 Duration  15   -KR         Manual Rx 2    Manual Rx 2 Location  Muscle energy technique  -KR      Manual Rx 2 Type  correction of R anterior/Left posterior inomminate  -KR      Manual Rx 2 Duration  10  -KR        User Key  (r) = Recorded By, (t) = Taken By, (c) = Cosigned By    Initials Name Provider Type    Angeles Shelton, PT DPT Physical Therapist          PT OP Goals     Row Name 07/29/19 1105          PT Short Term Goals    STG Date to Achieve  08/05/19  -KR     STG 1  Patient will begin a home exercise program to accelerate the recovery process  -KR     STG 1 Progress  Met  -KR     STG 2  Patient will be able to maintain correction of sacral torsion  -KR     STG 2 Progress  Met  -KR     STG 3  Patient will have an increase in hamstring length by 20 degrees on R  -KR     STG 3 Progress  Met  -KR        Long Term Goals    LTG Date to Achieve  08/26/19  -KR     LTG 1  Patient will have resolution of low back, SI joint and lateral thigh pain  -KR     LTG 1 Progress  Progressing  -KR     LTG 2  Patient will be able to tie right shoe and perform all self-care without modification or exacerbation of symptoms  -KR     LTG 2 Progress  Met  -KR     LTG 3  Patient will be able to perform TA abdominous activation while performing SLR in supine  -KR     LTG 3 Progress  Ongoing  -KR     LTG 4  Patient will be able to climb stairs with stabilization of core, SI joint and hips  -KR     LTG 4 Progress  Ongoing  -KR     LTG 5  Patient will be instructed in long term exercise program to continue to make gains following discharge from therapy  -KR     LTG 5 Progress  Ongoing  -KR        Time Calculation    PT Goal Re-Cert Due Date  08/14/19  -KR       User Key  (r) = Recorded By, (t) = Taken By, (c) = Cosigned By    Initials Name Provider Type    Angeles Shelton, PT DPT Physical Therapist          Therapy Education  Education Details: reviewed previous HEP; instructed in return to gym at [employer] in a safe manner and to begin a walking  program again.    Given: HEP, Symptoms/condition management, Pain management, Posture/body mechanics  Program: Reinforced, Progressed  How Provided: Verbal, Demonstration, Written  Provided to: Patient  Level of Understanding: Verbalized, Demonstrated              Time Calculation:   Start Time: 1105  Stop Time: 1145  Time Calculation (min): 40 min  Total Timed Code Minutes- PT: 40 minute(s)  Therapy Charges for Today     Code Description Service Date Service Provider Modifiers Qty    76415218647  PT THER PROC EA 15 MIN 7/29/2019 Angeles Chin, PT DPT GP 1    [id number]  PT MANUAL THERAPY EA 15 MIN 7/29/2019 Angeles Chin, PT DPT GP 2                    Angeles Chin, PT DPT  7/29/2019

## 2019-08-01 DIAGNOSIS — K50.00 CROHN'S DISEASE OF SMALL INTESTINE WITHOUT COMPLICATION (HCC): Primary | ICD-10-CM

## 2019-08-09 ENCOUNTER — LAB (OUTPATIENT)
Dept: LAB | Facility: HOSPITAL | Age: 69
End: 2019-08-09

## 2019-08-09 DIAGNOSIS — K50.00 CROHN'S DISEASE OF SMALL INTESTINE WITHOUT COMPLICATION (HCC): ICD-10-CM

## 2019-08-09 LAB
ALBUMIN SERPL-MCNC: 4 G/DL (ref 3.5–5)
ALBUMIN/GLOB SERPL: 1.4 G/DL (ref 1.1–2.5)
ALP SERPL-CCNC: 98 U/L (ref 24–120)
ALT SERPL W P-5'-P-CCNC: 28 U/L (ref 0–54)
ANION GAP SERPL CALCULATED.3IONS-SCNC: 7 MMOL/L (ref 4–13)
AST SERPL-CCNC: 22 U/L (ref 7–45)
BASOPHILS # BLD AUTO: 0.03 10*3/MM3 (ref 0–0.2)
BASOPHILS NFR BLD AUTO: 0.5 % (ref 0–1.5)
BILIRUB SERPL-MCNC: 0.5 MG/DL (ref 0.1–1)
BUN BLD-MCNC: 14 MG/DL (ref 5–21)
BUN/CREAT SERPL: 14.3 (ref 7–25)
CALCIUM SPEC-SCNC: 9.3 MG/DL (ref 8.4–10.4)
CHLORIDE SERPL-SCNC: 104 MMOL/L (ref 98–110)
CO2 SERPL-SCNC: 28 MMOL/L (ref 24–31)
CREAT BLD-MCNC: 0.98 MG/DL (ref 0.5–1.4)
DEPRECATED RDW RBC AUTO: 40.5 FL (ref 37–54)
EOSINOPHIL # BLD AUTO: 0.05 10*3/MM3 (ref 0–0.4)
EOSINOPHIL NFR BLD AUTO: 0.8 % (ref 0.3–6.2)
ERYTHROCYTE [DISTWIDTH] IN BLOOD BY AUTOMATED COUNT: 12.3 % (ref 12.3–15.4)
GFR SERPL CREATININE-BSD FRML MDRD: 77 ML/MIN/1.73
GLOBULIN UR ELPH-MCNC: 2.9 GM/DL
GLUCOSE BLD-MCNC: 101 MG/DL (ref 70–100)
HCT VFR BLD AUTO: 43.2 % (ref 37.5–51)
HGB BLD-MCNC: 15.3 G/DL (ref 13–17.7)
IMM GRANULOCYTES # BLD AUTO: 0.02 10*3/MM3 (ref 0–0.05)
IMM GRANULOCYTES NFR BLD AUTO: 0.3 % (ref 0–0.5)
LYMPHOCYTES # BLD AUTO: 0.79 10*3/MM3 (ref 0.7–3.1)
LYMPHOCYTES NFR BLD AUTO: 13.3 % (ref 19.6–45.3)
MCH RBC QN AUTO: 32.1 PG (ref 26.6–33)
MCHC RBC AUTO-ENTMCNC: 35.4 G/DL (ref 31.5–35.7)
MCV RBC AUTO: 90.8 FL (ref 79–97)
MONOCYTES # BLD AUTO: 0.47 10*3/MM3 (ref 0.1–0.9)
MONOCYTES NFR BLD AUTO: 7.9 % (ref 5–12)
NEUTROPHILS # BLD AUTO: 4.59 10*3/MM3 (ref 1.7–7)
NEUTROPHILS NFR BLD AUTO: 77.2 % (ref 42.7–76)
NRBC BLD AUTO-RTO: 0 /100 WBC (ref 0–0.2)
PLATELET # BLD AUTO: 184 10*3/MM3 (ref 140–450)
PMV BLD AUTO: 9.8 FL (ref 6–12)
POTASSIUM BLD-SCNC: 3.8 MMOL/L (ref 3.5–5.3)
PROT SERPL-MCNC: 6.9 G/DL (ref 6.3–8.7)
RBC # BLD AUTO: 4.76 10*6/MM3 (ref 4.14–5.8)
SODIUM BLD-SCNC: 139 MMOL/L (ref 135–145)
WBC NRBC COR # BLD: 5.95 10*3/MM3 (ref 3.4–10.8)

## 2019-08-09 PROCEDURE — 85025 COMPLETE CBC W/AUTO DIFF WBC: CPT | Performed by: INTERNAL MEDICINE

## 2019-08-09 PROCEDURE — 36415 COLL VENOUS BLD VENIPUNCTURE: CPT

## 2019-08-09 PROCEDURE — 80053 COMPREHEN METABOLIC PANEL: CPT | Performed by: INTERNAL MEDICINE

## 2019-08-12 ENCOUNTER — HOSPITAL ENCOUNTER (OUTPATIENT)
Dept: PHYSICAL THERAPY | Facility: HOSPITAL | Age: 69
Setting detail: THERAPIES SERIES
End: 2019-08-12

## 2019-08-26 ENCOUNTER — TRANSCRIBE ORDERS (OUTPATIENT)
Dept: ADMINISTRATIVE | Facility: HOSPITAL | Age: 69
End: 2019-08-26

## 2019-08-26 ENCOUNTER — APPOINTMENT (OUTPATIENT)
Dept: LAB | Facility: HOSPITAL | Age: 69
End: 2019-08-26

## 2019-08-26 DIAGNOSIS — D51.0 VITAMIN B12 DEFICIENCY ANEMIA DUE TO INTRINSIC FACTOR DEFICIENCY: ICD-10-CM

## 2019-08-26 DIAGNOSIS — Z00.00 ENCOUNTER FOR GENERAL ADULT MEDICAL EXAMINATION WITHOUT ABNORMAL FINDINGS: ICD-10-CM

## 2019-08-26 DIAGNOSIS — E53.8 DEFICIENCY OF OTHER SPECIFIED B GROUP VITAMINS: Primary | ICD-10-CM

## 2019-08-26 DIAGNOSIS — E29.1 TESTICULAR HYPOFUNCTION: ICD-10-CM

## 2019-08-26 DIAGNOSIS — Z12.5 ENCOUNTER FOR SCREENING FOR MALIGNANT NEOPLASM OF PROSTATE: ICD-10-CM

## 2019-08-26 LAB
ALBUMIN SERPL-MCNC: 4.1 G/DL (ref 3.5–5)
ALBUMIN/GLOB SERPL: 1.5 G/DL (ref 1.1–2.5)
ALP SERPL-CCNC: 93 U/L (ref 24–120)
ALT SERPL W P-5'-P-CCNC: 24 U/L (ref 0–54)
ANION GAP SERPL CALCULATED.3IONS-SCNC: 5 MMOL/L (ref 4–13)
ARTICHOKE IGE QN: 112 MG/DL (ref 0–99)
AST SERPL-CCNC: 22 U/L (ref 7–45)
BILIRUB SERPL-MCNC: 0.6 MG/DL (ref 0.1–1)
BILIRUB UR QL STRIP: NEGATIVE
BUN BLD-MCNC: 14 MG/DL (ref 5–21)
BUN/CREAT SERPL: 13.7 (ref 7–25)
CALCIUM SPEC-SCNC: 9.3 MG/DL (ref 8.4–10.4)
CHLORIDE SERPL-SCNC: 107 MMOL/L (ref 98–110)
CHOLEST SERPL-MCNC: 180 MG/DL (ref 130–200)
CLARITY UR: CLEAR
CO2 SERPL-SCNC: 28 MMOL/L (ref 24–31)
COLOR UR: YELLOW
CREAT BLD-MCNC: 1.02 MG/DL (ref 0.5–1.4)
DEPRECATED RDW RBC AUTO: 40.3 FL (ref 37–54)
ERYTHROCYTE [DISTWIDTH] IN BLOOD BY AUTOMATED COUNT: 12.2 % (ref 12.3–15.4)
GFR SERPL CREATININE-BSD FRML MDRD: 73 ML/MIN/1.73
GLOBULIN UR ELPH-MCNC: 2.8 GM/DL
GLUCOSE BLD-MCNC: 111 MG/DL (ref 70–100)
GLUCOSE UR STRIP-MCNC: NEGATIVE MG/DL
HCT VFR BLD AUTO: 45.3 % (ref 37.5–51)
HDLC SERPL-MCNC: 49 MG/DL
HGB BLD-MCNC: 16 G/DL (ref 13–17.7)
HGB UR QL STRIP.AUTO: NEGATIVE
KETONES UR QL STRIP: NEGATIVE
LDLC/HDLC SERPL: 2.1 {RATIO}
LEUKOCYTE ESTERASE UR QL STRIP.AUTO: NEGATIVE
MCH RBC QN AUTO: 32.1 PG (ref 26.6–33)
MCHC RBC AUTO-ENTMCNC: 35.3 G/DL (ref 31.5–35.7)
MCV RBC AUTO: 90.8 FL (ref 79–97)
NITRITE UR QL STRIP: NEGATIVE
PH UR STRIP.AUTO: 5.5 [PH] (ref 5–8)
PLATELET # BLD AUTO: 201 10*3/MM3 (ref 140–450)
PMV BLD AUTO: 9.6 FL (ref 6–12)
POTASSIUM BLD-SCNC: 4 MMOL/L (ref 3.5–5.3)
PROT SERPL-MCNC: 6.9 G/DL (ref 6.3–8.7)
PROT UR QL STRIP: NEGATIVE
PSA SERPL-MCNC: 4.11 NG/ML (ref 0–4)
RBC # BLD AUTO: 4.99 10*6/MM3 (ref 4.14–5.8)
SODIUM BLD-SCNC: 140 MMOL/L (ref 135–145)
SP GR UR STRIP: 1.01 (ref 1–1.03)
TRIGL SERPL-MCNC: 140 MG/DL (ref 0–149)
UROBILINOGEN UR QL STRIP: NORMAL
VIT B12 BLD-MCNC: 313 PG/ML (ref 239–931)
WBC NRBC COR # BLD: 4.46 10*3/MM3 (ref 3.4–10.8)

## 2019-08-26 PROCEDURE — 36415 COLL VENOUS BLD VENIPUNCTURE: CPT | Performed by: INTERNAL MEDICINE

## 2019-08-26 PROCEDURE — 80053 COMPREHEN METABOLIC PANEL: CPT | Performed by: INTERNAL MEDICINE

## 2019-08-26 PROCEDURE — 80061 LIPID PANEL: CPT | Performed by: INTERNAL MEDICINE

## 2019-08-26 PROCEDURE — 82607 VITAMIN B-12: CPT | Performed by: INTERNAL MEDICINE

## 2019-08-26 PROCEDURE — 84403 ASSAY OF TOTAL TESTOSTERONE: CPT | Performed by: INTERNAL MEDICINE

## 2019-08-26 PROCEDURE — 85027 COMPLETE CBC AUTOMATED: CPT | Performed by: INTERNAL MEDICINE

## 2019-08-26 PROCEDURE — 81003 URINALYSIS AUTO W/O SCOPE: CPT | Performed by: INTERNAL MEDICINE

## 2019-08-26 PROCEDURE — G0103 PSA SCREENING: HCPCS | Performed by: INTERNAL MEDICINE

## 2019-08-27 LAB — TESTOST SERPL-MCNC: 468 NG/DL (ref 264–916)

## 2019-08-29 ENCOUNTER — OFFICE VISIT (OUTPATIENT)
Dept: OTOLARYNGOLOGY | Facility: CLINIC | Age: 69
End: 2019-08-29

## 2019-08-29 VITALS
TEMPERATURE: 97.8 F | WEIGHT: 181 LBS | HEART RATE: 74 BPM | SYSTOLIC BLOOD PRESSURE: 123 MMHG | DIASTOLIC BLOOD PRESSURE: 80 MMHG | BODY MASS INDEX: 24.52 KG/M2 | HEIGHT: 72 IN | OXYGEN SATURATION: 96 %

## 2019-08-29 DIAGNOSIS — K21.9 GASTROESOPHAGEAL REFLUX DISEASE WITHOUT ESOPHAGITIS: ICD-10-CM

## 2019-08-29 DIAGNOSIS — J34.1: ICD-10-CM

## 2019-08-29 DIAGNOSIS — J31.0 CHRONIC RHINITIS: ICD-10-CM

## 2019-08-29 DIAGNOSIS — J32.0 CHRONIC MAXILLARY SINUSITIS: Primary | ICD-10-CM

## 2019-08-29 PROCEDURE — 99214 OFFICE O/P EST MOD 30 MIN: CPT | Performed by: PHYSICIAN ASSISTANT

## 2019-08-29 RX ORDER — FLUTICASONE PROPIONATE 50 MCG
2 SPRAY, SUSPENSION (ML) NASAL DAILY
Qty: 16 G | Refills: 11 | Status: SHIPPED | OUTPATIENT
Start: 2019-08-29 | End: 2021-07-19

## 2019-09-23 RX ORDER — MESALAMINE 500 MG/1
1000 CAPSULE, EXTENDED RELEASE ORAL 4 TIMES DAILY
Qty: 720 CAPSULE | Refills: 3 | Status: SHIPPED | OUTPATIENT
Start: 2019-09-23 | End: 2021-07-19

## 2019-11-08 DIAGNOSIS — K50.90 CROHN'S DISEASE OF INTESTINE WITHOUT COMPLICATION (HCC): Primary | ICD-10-CM

## 2019-11-12 ENCOUNTER — LAB (OUTPATIENT)
Dept: LAB | Facility: HOSPITAL | Age: 69
End: 2019-11-12

## 2019-11-12 ENCOUNTER — OFFICE VISIT (OUTPATIENT)
Dept: GASTROENTEROLOGY | Facility: CLINIC | Age: 69
End: 2019-11-12

## 2019-11-12 VITALS
SYSTOLIC BLOOD PRESSURE: 130 MMHG | TEMPERATURE: 96.5 F | WEIGHT: 182 LBS | HEART RATE: 79 BPM | OXYGEN SATURATION: 100 % | DIASTOLIC BLOOD PRESSURE: 84 MMHG | BODY MASS INDEX: 24.65 KG/M2 | HEIGHT: 72 IN

## 2019-11-12 DIAGNOSIS — Z79.899 ENCOUNTER FOR MONITORING IMMUNOMODULATING THERAPY: ICD-10-CM

## 2019-11-12 DIAGNOSIS — K50.00 CROHN'S DISEASE OF SMALL INTESTINE WITHOUT COMPLICATION (HCC): Primary | ICD-10-CM

## 2019-11-12 DIAGNOSIS — K50.90 CROHN'S DISEASE OF INTESTINE WITHOUT COMPLICATION (HCC): ICD-10-CM

## 2019-11-12 DIAGNOSIS — K21.9 GASTROESOPHAGEAL REFLUX DISEASE WITHOUT ESOPHAGITIS: ICD-10-CM

## 2019-11-12 DIAGNOSIS — Z51.81 ENCOUNTER FOR MONITORING IMMUNOMODULATING THERAPY: ICD-10-CM

## 2019-11-12 LAB
ALBUMIN SERPL-MCNC: 4.4 G/DL (ref 3.5–5.2)
ALBUMIN/GLOB SERPL: 1.8 G/DL
ALP SERPL-CCNC: 104 U/L (ref 39–117)
ALT SERPL W P-5'-P-CCNC: 13 U/L (ref 1–41)
ANION GAP SERPL CALCULATED.3IONS-SCNC: 6 MMOL/L (ref 5–15)
AST SERPL-CCNC: 14 U/L (ref 1–40)
BASOPHILS # BLD AUTO: 0.04 10*3/MM3 (ref 0–0.2)
BASOPHILS NFR BLD AUTO: 0.8 % (ref 0–1.5)
BILIRUB SERPL-MCNC: 0.5 MG/DL (ref 0.2–1.2)
BUN BLD-MCNC: 17 MG/DL (ref 8–23)
BUN/CREAT SERPL: 18.3 (ref 7–25)
CALCIUM SPEC-SCNC: 9.5 MG/DL (ref 8.6–10.5)
CHLORIDE SERPL-SCNC: 103 MMOL/L (ref 98–107)
CO2 SERPL-SCNC: 31 MMOL/L (ref 22–29)
CREAT BLD-MCNC: 0.93 MG/DL (ref 0.76–1.27)
DEPRECATED RDW RBC AUTO: 41.7 FL (ref 37–54)
EOSINOPHIL # BLD AUTO: 0.12 10*3/MM3 (ref 0–0.4)
EOSINOPHIL NFR BLD AUTO: 2.3 % (ref 0.3–6.2)
ERYTHROCYTE [DISTWIDTH] IN BLOOD BY AUTOMATED COUNT: 12.4 % (ref 12.3–15.4)
GFR SERPL CREATININE-BSD FRML MDRD: 81 ML/MIN/1.73
GLOBULIN UR ELPH-MCNC: 2.5 GM/DL
GLUCOSE BLD-MCNC: 89 MG/DL (ref 65–99)
HCT VFR BLD AUTO: 44.4 % (ref 37.5–51)
HGB BLD-MCNC: 15.6 G/DL (ref 13–17.7)
IMM GRANULOCYTES # BLD AUTO: 0.02 10*3/MM3 (ref 0–0.05)
IMM GRANULOCYTES NFR BLD AUTO: 0.4 % (ref 0–0.5)
LYMPHOCYTES # BLD AUTO: 0.64 10*3/MM3 (ref 0.7–3.1)
LYMPHOCYTES NFR BLD AUTO: 12.4 % (ref 19.6–45.3)
MCH RBC QN AUTO: 32.2 PG (ref 26.6–33)
MCHC RBC AUTO-ENTMCNC: 35.1 G/DL (ref 31.5–35.7)
MCV RBC AUTO: 91.5 FL (ref 79–97)
MONOCYTES # BLD AUTO: 0.52 10*3/MM3 (ref 0.1–0.9)
MONOCYTES NFR BLD AUTO: 10.1 % (ref 5–12)
NEUTROPHILS # BLD AUTO: 3.83 10*3/MM3 (ref 1.7–7)
NEUTROPHILS NFR BLD AUTO: 74 % (ref 42.7–76)
NRBC BLD AUTO-RTO: 0 /100 WBC (ref 0–0.2)
PLATELET # BLD AUTO: 182 10*3/MM3 (ref 140–450)
PMV BLD AUTO: 9.6 FL (ref 6–12)
POTASSIUM BLD-SCNC: 4.2 MMOL/L (ref 3.5–5.2)
PROT SERPL-MCNC: 6.9 G/DL (ref 6–8.5)
RBC # BLD AUTO: 4.85 10*6/MM3 (ref 4.14–5.8)
SODIUM BLD-SCNC: 140 MMOL/L (ref 136–145)
WBC NRBC COR # BLD: 5.17 10*3/MM3 (ref 3.4–10.8)

## 2019-11-12 PROCEDURE — 99214 OFFICE O/P EST MOD 30 MIN: CPT | Performed by: INTERNAL MEDICINE

## 2019-11-12 PROCEDURE — 36415 COLL VENOUS BLD VENIPUNCTURE: CPT

## 2019-11-12 PROCEDURE — 85025 COMPLETE CBC W/AUTO DIFF WBC: CPT | Performed by: INTERNAL MEDICINE

## 2019-11-12 PROCEDURE — 80053 COMPREHEN METABOLIC PANEL: CPT | Performed by: INTERNAL MEDICINE

## 2019-11-12 RX ORDER — METHYLPREDNISOLONE 4 MG/1
TABLET ORAL
Qty: 21 TABLET | Refills: 0 | Status: SHIPPED | OUTPATIENT
Start: 2019-11-12 | End: 2020-09-01

## 2019-11-12 NOTE — PROGRESS NOTES
[facility]    Chief Complaint   Patient presents with   • Crohn's Disease       Subjective     HPI    Vinicio Ring is a 69 y.o. male who presents with a chief complaint of Crohn's.    He was last here 6 months ago.  See HPI from that visit copy below.  He tells me he has had 3 episodes of attacks of abdominal discomfort.  The last one was about 2 weeks ago.  He started on a Thursday evening.  He rested but it persisted through Friday so he started on a Medrol Dosepak.  Once he started a Medrol Dosepak it subsided.  The 2 prior episodes he did not need to take the steroid pack.  The acute abdominal cramps lasted only a few hours and then subsided on their own.  He has tried taking the Bentyl in the past for these attacks but is had no change in his symptoms with the Bentyl use.    He currently is back to his normal self.  He denies any constipation diarrhea.  He is eating what he wants to.  His weight is stable.  He had lab work today to include a CBC and CMP and I reviewed this it was unremarkable.  His labs in May included a Prometheus TP metabolite level and is within therapeutic range for azathioprine.    He continues to see a dermatologist.  He states he has had some excessive skin peeling on his face that his dermatologist diagnosed as rosacea.  He has a cream to take but has not started it yet.  He has no other complaints or nothing else to report he states.      ========================= May 2019 HPI=============================  HPI     Vinicio Ring is a 68 y.o. male who presents with a chief complaint of Crohn's.     He was here in February when he had an episode of acute abdominal pain lasting overnight.  He presented to the hospital but he came to the office as he was starting to feel better.  We treated him Medrol Dosepak.  He felt better.  He states he had a slight episode and that was at the beginning of April.  He did take a Medrol Dosepak then.  And that he feels  good.  He states he started exercising in the beginning of April and is done well since then.  He is put on about 3 pounds.  He is got a good appetite.  He has had no abdominal pain.  Has daily bowel months.  Denies blood in stools.  Continues on Imuran.  We did check his metabolite levels in February and they were within normal therapeutic range.    Past Medical History:   Diagnosis Date   • Antral cyst 12/14/2017   • Chronic rhinitis 12/14/2017   • Elevated PSA    • GERD (gastroesophageal reflux disease)    • Rosacea, unspecified        Past Surgical History:   Procedure Laterality Date   • CAPSULE ENDOSCOPY N/A 10/19/2016    Procedure: CAPSULE ENDOSCOPY AGILE;  Surgeon: Hector Mooney MD;  Location:  [facility] ENDOSCOPY;  Service:    • CHOLECYSTECTOMY     • CHOLECYSTECTOMY     • COLONOSCOPY N/A 10/19/2016    Procedure: COLONOSCOPY WITH ANESTHESIA;  Surgeon: Hector Mooney MD;  Location:  [facility] ENDOSCOPY;  Service:    • ENDOSCOPY N/A 10/19/2016    Procedure: ESOPHAGOGASTRODUODENOSCOPY WITH ANESTHESIA;  Surgeon: Hector Mooney MD;  Location:  [facility] ENDOSCOPY;  Service:          Current Outpatient Medications:   •  azaTHIOprine (IMURAN) 50 MG tablet, Take 1 tablet by mouth Daily., Disp: 30 tablet, Rfl: 11  •  baclofen (LIORESAL) 10 MG tablet, Take 1 tablet by mouth At Night As Needed for Muscle Spasms., Disp: 20 tablet, Rfl: 1  •  dicyclomine (BENTYL) 10 MG capsule, Take 1 capsule by mouth 3 (Three) Times a Day As Needed (for abdominal discomfort)., Disp: 90 capsule, Rfl: 6  •  Famotidine (PEPCID AC PO), Take  by mouth Every Night., Disp: , Rfl:   •  fluticasone (FLONASE) 50 MCG/ACT nasal spray, Use 2 sprays in each nostril Daily., Disp: 16 g, Rfl: 11  •  mesalamine (PENTASA) 500 MG CR capsule, Take 2 capsules by mouth 4 (Four) Times a Day., Disp: 720 capsule, Rfl: 3  •  ondansetron (ZOFRAN) 8 MG tablet, Take 1 tablet by mouth Every 8 (Eight) Hours As Needed for Nausea or Vomiting., Disp: 10 tablet, Rfl: 1  •   fluorouracil (EFUDEX) 5 % cream, Apply to the left forehead and left cheek area -apply up to 2 weeks or until red, crusty, and raw, Disp: 40 g, Rfl: 2  •  methylPREDNISolone (MEDROL, MIKE,) 4 MG tablet, Take as directed on package instructions., Disp: 21 tablet, Rfl: 0  •  metroNIDAZOLE (METROGEL) 0.75 % gel, Apply  topically to the appropriate area as directed., Disp: 45 g, Rfl: 3  •  metroNIDAZOLE (METROGEL) 1 % gel, Apply to the right and left temple area as directed, Disp: 60 g, Rfl: 2  •  Sod Picosulfate-Mag Ox-Cit Acd (CLENPIQ) 10-3.5-12 MG-GM -GM/160ML solution, Take 160 mL by mouth Take As Directed. Take 160 ml the night prior to colonoscopy and another 160 ml 4 hours prior, Disp: 2 bottle, Rfl: 0    No Known Allergies    Social History     Socioeconomic History   • Marital status:      Spouse name: Not on file   • Number of children: Not on file   • Years of education: Not on file   • Highest education level: Not on file   Tobacco Use   • Smoking status: Never Smoker   • Smokeless tobacco: Never Used   Substance and Sexual Activity   • Alcohol use: Yes     Alcohol/week: 8.4 oz     Types: 14 Glasses of wine per week     Comment: daily   • Drug use: No   • Sexual activity: Defer       Family History   Problem Relation Age of Onset   • No Known Problems Father    • No Known Problems Mother    • Colon cancer Maternal Aunt    • Colon polyps Paternal Uncle    • Heart disease Maternal Grandfather    • Heart disease Paternal Grandfather        Review of Systems  General no fever chills or sweats weight stable  Gastrointestinal: Not present-abdominal pain, constipation, diarrhea, dysphagia, hematemesis, melena, odynophagia, nausea, vomiting, pyrosis, regurgitation, hematochezia,    Objective     Vitals:    11/12/19 1338   BP: 130/84   Pulse: 79   Temp: 96.5 °F (35.8 °C)   SpO2: 100%       Physical Exam  No acute distress. Vital signs as documented. Skin warm and dry and without overt rashes. EOMI, sclera  anicteric.  Neck without JVD or masses. Lungs clear to auscultation bilaterally, no rales. Heart exam notable for regular rhythm, normal sounds and absence of loud murmurs, rubs or gallops. Abdomen is soft, nontender, non distended, normal bowel sounds and without evidence of organomegaly, masses, or abdominal aortic enlargement. Extremities nonedematous, no cyanosis. Neuro alert, moves extremities.        Assessment/Plan   Problem List Items Addressed This Visit        Digestive    Crohn's disease of small intestine without complication (CMS/HCC) - Primary    Overview     Diagnosed in 2016.  Symptoms manifest by intermittent intestinal obstruction.  Prometheus positive for IBD.         Relevant Orders    Case Request (Completed)    GERD (gastroesophageal reflux disease)       Other    Encounter for monitoring immunomodulating therapy    Overview     TPMT enzyme levels are intermediate which can possibly increase risk of toxicity with Imuran at high doses.    T Spot negative June 2017, 2/2019  Test x-ray ordered August 2018  Patient has received the Pneumovax, as well as the shingles vaccine.  He did receive the flu shot for the 2017/18 season.  He will need repeated next season  He sees dermatology, Dr. Taylor, routinely and was advised to continue and why.  Hepatitis A and B vaccinations ordered August 2018, as of 5/9/19 has had a/b vaccinations and is due for 6 month booster in 3 mos.                  Regarding his Crohn's he continues to have intermittent attacks of abdominal discomfort.  They usually last less than 24 hours.  He is had to use 2 Medrol Dosepaks this year.  I discussed with him that he could continue to have active smoldering Crohn's disease.  I therefore think we need to pursue an investigation to determine if this is the case.  I suggested a colonoscopy as it has been 3 years since his last colonoscopy which did show some small ulcerations scattered throughout his terminal ileum.  I did not  suggest an M2A capsule due to risk of it becoming stuck and he does not want to pursue them today.  He does agree to the colonoscopy.    If it is unremarkable we may consider if the current regimen.  If it does show active disease, I talked to him about the option of switching the Imuran over to a biologic such as Humira.  We discussed the option of continuing both dual therapy for several months to allow a response then stop the Imuran.  We talked about the side effects of doing this with immunosuppression.  We talked about the side effects of Biologics compared to azathioprine in detail.  I once again explained immunosuppression to him to include increased risk of infections, increased risk of skin cancers and rare cancer such as lymphomas etc.  Questions were answered.    We will plan to see him back in the office in 3 months and set up the colonoscopy in the next month or 2 when his schedule allows.  I did refill his prescription for a Medrol Dosepak to have on standby.    Continue ongoing management by primary care provider and other specialists.     Patient's Body mass index is 24.68 kg/m². BMI is within normal parameters. No follow-up required..        EMR Dragon/transcription disclaimer:  Much of this encounter note is electronic transcription/translation of spoken language to printed text.  The electronic translation of spoken language may be erroneous, or at times, nonsensical words or phrases may be inadvertently transcribed.  Although I have reviewed the note for such errors, some may still exist.    Hector Mooney MD  3:14 PM  11/12/19

## 2019-12-16 ENCOUNTER — DOCUMENTATION (OUTPATIENT)
Dept: PHYSICAL THERAPY | Facility: CLINIC | Age: 69
End: 2019-12-16

## 2019-12-16 DIAGNOSIS — M79.651 PAIN OF RIGHT LATERAL UPPER THIGH: ICD-10-CM

## 2019-12-16 DIAGNOSIS — M25.551 PAIN OF RIGHT HIP: ICD-10-CM

## 2019-12-16 DIAGNOSIS — M54.50 ACUTE RIGHT-SIDED LOW BACK PAIN WITHOUT SCIATICA: Primary | ICD-10-CM

## 2019-12-16 PROCEDURE — PTNOCHG PR CUSTOM PT NO CHARGE VISIT: Performed by: PHYSICAL THERAPIST

## 2019-12-16 NOTE — PROGRESS NOTES
Outpatient Physical Therapy Discharge Summary         Patient Name: Vinicio Ring  : 1950  MRN: [id number]    Today's Date: 2019    Visit Dx:    ICD-10-CM ICD-9-CM   1. Acute right-sided low back pain without sciatica M54.5 724.2   2. Pain of right hip M25.551 719.45   3. Pain of right lateral upper thigh M79.651 729.5       PT OP Goals     Row Name 19 0921          PT Short Term Goals    STG Date to Achieve  19  -KR     STG 1  Patient will begin a home exercise program to accelerate the recovery process  -KR     STG 1 Progress  Met  -KR     STG 2  Patient will be able to maintain correction of sacral torsion  -KR     STG 2 Progress  Met  -KR     STG 3  Patient will have an increase in hamstring length by 20 degrees on R  -KR     STG 3 Progress  Met  -KR        Long Term Goals    LTG Date to Achieve  19  -KR     LTG 1  Patient will have resolution of low back, SI joint and lateral thigh pain  -KR     LTG 1 Progress  Met  -KR     LTG 2  Patient will be able to tie right shoe and perform all self-care without modification or exacerbation of symptoms  -KR     LTG 2 Progress  Met  -KR     LTG 3  Patient will be able to perform TA abdominous activation while performing SLR in supine  -KR     LTG 3 Progress  Met  -KR     LTG 4  Patient will be able to climb stairs with stabilization of core, SI joint and hips  -KR     LTG 4 Progress  Met  -KR     LTG 5  Patient will be instructed in long term exercise program to continue to make gains following discharge from therapy  -KR     LTG 5 Progress  Met  -KR       User Key  (r) = Recorded By, (t) = Taken By, (c) = Cosigned By    Initials Name Provider Type    Angeles Shelton, PT DPT Physical Therapist          OP PT Discharge Summary  Date of Discharge: 19  Reason for Discharge: All goals achieved  Outcomes Achieved: Able to achieve all goals within established timeline  Discharge Destination: Home with home program  Discharge  Instructions/Additional Comments: Independent in HEP and management of condition.      Time Calculation:                    Angeles Chin, PT DPT  12/16/2019

## 2019-12-17 ENCOUNTER — HOSPITAL ENCOUNTER (OUTPATIENT)
Facility: HOSPITAL | Age: 69
Setting detail: HOSPITAL OUTPATIENT SURGERY
Discharge: HOME | End: 2019-12-17
Attending: INTERNAL MEDICINE | Admitting: INTERNAL MEDICINE

## 2019-12-17 ENCOUNTER — ANESTHESIA (OUTPATIENT)
Dept: GASTROENTEROLOGY | Facility: HOSPITAL | Age: 69
End: 2019-12-17

## 2019-12-17 ENCOUNTER — ANESTHESIA EVENT (OUTPATIENT)
Dept: GASTROENTEROLOGY | Facility: HOSPITAL | Age: 69
End: 2019-12-17

## 2019-12-17 VITALS
TEMPERATURE: 98.3 F | SYSTOLIC BLOOD PRESSURE: 120 MMHG | DIASTOLIC BLOOD PRESSURE: 83 MMHG | HEART RATE: 78 BPM | RESPIRATION RATE: 12 BRPM | OXYGEN SATURATION: 95 % | WEIGHT: 181 LBS | HEIGHT: 72 IN | BODY MASS INDEX: 24.52 KG/M2

## 2019-12-17 DIAGNOSIS — K50.00 CROHN'S DISEASE OF SMALL INTESTINE WITHOUT COMPLICATION (HCC): ICD-10-CM

## 2019-12-17 PROCEDURE — 45380 COLONOSCOPY AND BIOPSY: CPT | Performed by: INTERNAL MEDICINE

## 2019-12-17 PROCEDURE — 25010000002 PROPOFOL 10 MG/ML EMULSION: Performed by: NURSE ANESTHETIST, CERTIFIED REGISTERED

## 2019-12-17 PROCEDURE — 88305 TISSUE EXAM BY PATHOLOGIST: CPT | Performed by: INTERNAL MEDICINE

## 2019-12-17 RX ORDER — PROPOFOL 10 MG/ML
VIAL (ML) INTRAVENOUS AS NEEDED
Status: DISCONTINUED | OUTPATIENT
Start: 2019-12-17 | End: 2019-12-17 | Stop reason: SURG

## 2019-12-17 RX ORDER — ONDANSETRON 2 MG/ML
4 INJECTION INTRAMUSCULAR; INTRAVENOUS ONCE AS NEEDED
Status: DISCONTINUED | OUTPATIENT
Start: 2019-12-17 | End: 2019-12-17 | Stop reason: HOSPADM

## 2019-12-17 RX ORDER — SODIUM CHLORIDE 9 MG/ML
500 INJECTION, SOLUTION INTRAVENOUS CONTINUOUS PRN
Status: DISCONTINUED | OUTPATIENT
Start: 2019-12-17 | End: 2019-12-17 | Stop reason: HOSPADM

## 2019-12-17 RX ORDER — SODIUM CHLORIDE 0.9 % (FLUSH) 0.9 %
10 SYRINGE (ML) INJECTION AS NEEDED
Status: DISCONTINUED | OUTPATIENT
Start: 2019-12-17 | End: 2019-12-17 | Stop reason: HOSPADM

## 2019-12-17 RX ADMIN — PROPOFOL 50 MG: 10 INJECTION, EMULSION INTRAVENOUS at 08:15

## 2019-12-17 RX ADMIN — PROPOFOL 50 MG: 10 INJECTION, EMULSION INTRAVENOUS at 08:12

## 2019-12-17 RX ADMIN — PROPOFOL 100 MG: 10 INJECTION, EMULSION INTRAVENOUS at 08:21

## 2019-12-17 RX ADMIN — SODIUM CHLORIDE 500 ML: 0.9 INJECTION, SOLUTION INTRAVENOUS at 07:44

## 2019-12-17 RX ADMIN — PROPOFOL 90 MG: 10 INJECTION, EMULSION INTRAVENOUS at 08:10

## 2019-12-17 RX ADMIN — PROPOFOL 50 MG: 10 INJECTION, EMULSION INTRAVENOUS at 08:13

## 2019-12-17 RX ADMIN — LIDOCAINE HYDROCHLORIDE 100 MG: 20 INJECTION, SOLUTION INTRAVENOUS at 08:10

## 2019-12-17 RX ADMIN — PROPOFOL 60 MG: 10 INJECTION, EMULSION INTRAVENOUS at 08:16

## 2019-12-17 NOTE — H&P
[facility] Gastroenterology  Pre Procedure History & Physical    Chief Complaint:   Crohn's    Subjective     HPI:   He is feeling well.  He has had no further attacks abdominal pain since he was in the office in early November.  He presents today for colonoscopy    Past Medical History:   Past Medical History:   Diagnosis Date   • Antral cyst 12/14/2017   • Chronic rhinitis 12/14/2017   • Elevated PSA    • GERD (gastroesophageal reflux disease)    • Rosacea, unspecified        Past Surgical History:  Past Surgical History:   Procedure Laterality Date   • CAPSULE ENDOSCOPY N/A 10/19/2016    Procedure: CAPSULE ENDOSCOPY AGILE;  Surgeon: Hector Mooney MD;  Location: [facility] ENDOSCOPY;  Service:    • CHOLECYSTECTOMY     • CHOLECYSTECTOMY     • COLONOSCOPY N/A 10/19/2016    Procedure: COLONOSCOPY WITH ANESTHESIA;  Surgeon: Hector Mooney MD;  Location:  [facility] ENDOSCOPY;  Service:    • ENDOSCOPY N/A 10/19/2016    Procedure: ESOPHAGOGASTRODUODENOSCOPY WITH ANESTHESIA;  Surgeon: Hector Mooney MD;  Location:  [facility] ENDOSCOPY;  Service:         Family History:  Family History   Problem Relation Age of Onset   • No Known Problems Father    • No Known Problems Mother    • Colon cancer Maternal Aunt    • Colon polyps Paternal Uncle    • Heart disease Maternal Grandfather    • Heart disease Paternal Grandfather        Social History:   reports that he has never smoked. He has never used smokeless tobacco. He reports that he drinks about 14.0 standard drinks of alcohol per week. He reports that he does not use drugs.    Medications:   Prior to Admission medications    Medication Sig Start Date End Date Taking? Authorizing Provider   Famotidine (PEPCID AC PO) Take  by mouth Every Night.   Yes Provider, MD Wilver   Sod Picosulfate-Mag Ox-Cit Acd (CLENPIQ) 10-3.5-12 MG-GM -GM/160ML solution Take 160 mL by mouth Take As Directed. Take 160 ml the night prior to colonoscopy and another 160 ml 4 hours prior 11/12/19   "Yes Hector Mooney MD   azaTHIOprine (IMURAN) 50 MG tablet Take 1 tablet by mouth Daily. 2/20/19   Hector Mooney MD   baclofen (LIORESAL) 10 MG tablet Take 1 tablet by mouth At Night As Needed for Muscle Spasms. 7/11/19   Alexey Vasquez DO   dicyclomine (BENTYL) 10 MG capsule Take 1 capsule by mouth 3 (Three) Times a Day As Needed (for abdominal discomfort). 8/13/18   Hector Mooney MD   fluorouracil (EFUDEX) 5 % cream Apply to the left forehead and left cheek area -apply up to 2 weeks or until red, crusty, and raw 9/24/19      fluticasone (FLONASE) 50 MCG/ACT nasal spray Use 2 sprays in each nostril Daily. 8/29/19   Rober Trinidad PA   fluticasone (FLONASE) 50 MCG/ACT nasal spray Use 2 sprays in the nostrils daily 11/25/19      mesalamine (PENTASA) 500 MG CR capsule Take 2 capsules by mouth 4 (Four) Times a Day. 9/23/19   Hector Mooney MD   methylPREDNISolone (MEDROL, MIKE,) 4 MG tablet Take as directed on package instructions. 11/12/19   Hector Mooney MD   metroNIDAZOLE (METROGEL) 0.75 % gel Apply  topically to the appropriate area as directed. 9/27/19      metroNIDAZOLE (METROGEL) 1 % gel Apply to the right and left temple area as directed 9/24/19      ondansetron (ZOFRAN) 8 MG tablet Take 1 tablet by mouth Every 8 (Eight) Hours As Needed for Nausea or Vomiting. 2/7/19   Hector Mooney MD   Sod Picosulfate-Mag Ox-Cit Acd 10-3.5-12 MG-GM -GM/160ML solution Take as directed on package instructions 12/13/19   Hector Mooney MD       Allergies:  Patient has no known allergies.    ROS:    General: Weight stable  Resp: No SOA  Cardiovascular: No CP    Objective     Blood pressure 134/90, pulse 80, temperature 98.3 °F (36.8 °C), temperature source Tympanic, resp. rate 18, height 182.9 cm (72\"), weight 82.1 kg (181 lb), SpO2 94 %.    Physical Exam   Constitutional: Pt is oriented to person, place, and in no distress.   HENT: Mouth/Throat: Oropharynx is clear.   Cardiovascular: " Normal rate, regular rhythm.    Pulmonary/Chest: Effort normal. No respiratory distress. No  wheezes.   Abdominal: Soft. Non-distended.  Skin: Skin is warm and dry.   Psychiatric: Mood, memory, affect and judgment appear normal.     Assessment/Plan     Diagnosis:  Crohn's    Anticipated Surgical Procedure:  Colonoscopy    The risks, benefits, and alternatives of this procedure have been discussed with the patient or the responsible party- the patient understands and agrees to proceed.    EMR Dragon/transcription disclaimer:  Much of this encounter note is electronic transcription/translation of spoken language to printed text.  The electronic translation of spoken language may be erroneous, or at times, nonsensical words or phrases may be inadvertently transcribed.  Although I have reviewed the note for such errors, some may still exist.

## 2019-12-17 NOTE — ANESTHESIA PREPROCEDURE EVALUATION
Anesthesia Evaluation     Patient summary reviewed   no history of anesthetic complications:  NPO Solid Status: > 8 hours             Airway   Mallampati: II  TM distance: >3 FB  Neck ROM: full  Dental      Pulmonary - negative pulmonary ROS   Cardiovascular - negative cardio ROS  Exercise tolerance: excellent (>7 METS)        Neuro/Psych- negative ROS  GI/Hepatic/Renal/Endo    (+)  GERD,      ROS Comment: crohns    Musculoskeletal     Abdominal    Substance History      OB/GYN          Other                        Anesthesia Plan    ASA 2     MAC       Anesthetic plan, all risks, benefits, and alternatives have been provided, discussed and informed consent has been obtained with: patient.

## 2019-12-17 NOTE — ANESTHESIA POSTPROCEDURE EVALUATION
Patient: Vinicio Ring    Procedure Summary     Date:  12/17/19 Room / Location:  [facility] ENDOSCOPY 4 / [facility] ENDOSCOPY    Anesthesia Start:  0806 Anesthesia Stop:  0832    Procedure:  COLONOSCOPY (N/A ) Diagnosis:       Crohn's disease of small intestine without complication (CMS/HCC)      (Crohn's disease of small intestine without complication (CMS/HCC) [K50.00])    Surgeon:  Hector Mooney MD Provider:  James Crooks CRNA    Anesthesia Type:  MAC ASA Status:  2          Anesthesia Type: MAC    Vitals  No vitals data found for the desired time range.          Post Anesthesia Care and Evaluation    Patient location during evaluation: PHASE II  Patient participation: complete - patient participated  Level of consciousness: awake  Pain score: 0  Pain management: adequate  Airway patency: patent  Anesthetic complications: No anesthetic complications  PONV Status: none  Cardiovascular status: acceptable  Respiratory status: acceptable  Hydration status: acceptable

## 2019-12-18 LAB
LAB AP CASE REPORT: NORMAL
PATH REPORT.FINAL DX SPEC: NORMAL
PATH REPORT.GROSS SPEC: NORMAL

## 2020-01-20 DIAGNOSIS — K50.90 CROHN'S DISEASE OF INTESTINE WITHOUT COMPLICATION (HCC): Primary | ICD-10-CM

## 2020-02-13 ENCOUNTER — LAB (OUTPATIENT)
Dept: LAB | Facility: HOSPITAL | Age: 70
End: 2020-02-13

## 2020-02-13 ENCOUNTER — OFFICE VISIT (OUTPATIENT)
Dept: GASTROENTEROLOGY | Facility: CLINIC | Age: 70
End: 2020-02-13

## 2020-02-13 VITALS
HEIGHT: 72 IN | DIASTOLIC BLOOD PRESSURE: 82 MMHG | TEMPERATURE: 96.3 F | SYSTOLIC BLOOD PRESSURE: 128 MMHG | HEART RATE: 94 BPM | OXYGEN SATURATION: 99 % | WEIGHT: 184 LBS | BODY MASS INDEX: 24.92 KG/M2

## 2020-02-13 DIAGNOSIS — K50.00 CROHN'S DISEASE OF SMALL INTESTINE WITHOUT COMPLICATION (HCC): Primary | ICD-10-CM

## 2020-02-13 DIAGNOSIS — K50.90 CROHN'S DISEASE OF INTESTINE WITHOUT COMPLICATION (HCC): ICD-10-CM

## 2020-02-13 DIAGNOSIS — Z79.899 ENCOUNTER FOR MONITORING IMMUNOMODULATING THERAPY: ICD-10-CM

## 2020-02-13 DIAGNOSIS — Z51.81 ENCOUNTER FOR MONITORING IMMUNOMODULATING THERAPY: ICD-10-CM

## 2020-02-13 LAB
ALBUMIN SERPL-MCNC: 4.5 G/DL (ref 3.5–5.2)
ALBUMIN/GLOB SERPL: 2 G/DL
ALP SERPL-CCNC: 111 U/L (ref 39–117)
ALT SERPL W P-5'-P-CCNC: 18 U/L (ref 1–41)
ANION GAP SERPL CALCULATED.3IONS-SCNC: 10 MMOL/L (ref 5–15)
AST SERPL-CCNC: 21 U/L (ref 1–40)
BASOPHILS # BLD AUTO: 0.05 10*3/MM3 (ref 0–0.2)
BASOPHILS NFR BLD AUTO: 0.9 % (ref 0–1.5)
BILIRUB SERPL-MCNC: 0.4 MG/DL (ref 0.2–1.2)
BUN BLD-MCNC: 16 MG/DL (ref 8–23)
BUN/CREAT SERPL: 17.4 (ref 7–25)
CALCIUM SPEC-SCNC: 9.5 MG/DL (ref 8.6–10.5)
CHLORIDE SERPL-SCNC: 101 MMOL/L (ref 98–107)
CO2 SERPL-SCNC: 27 MMOL/L (ref 22–29)
CREAT BLD-MCNC: 0.92 MG/DL (ref 0.76–1.27)
DEPRECATED RDW RBC AUTO: 42.3 FL (ref 37–54)
EOSINOPHIL # BLD AUTO: 0.11 10*3/MM3 (ref 0–0.4)
EOSINOPHIL NFR BLD AUTO: 2 % (ref 0.3–6.2)
ERYTHROCYTE [DISTWIDTH] IN BLOOD BY AUTOMATED COUNT: 12.6 % (ref 12.3–15.4)
GFR SERPL CREATININE-BSD FRML MDRD: 82 ML/MIN/1.73
GLOBULIN UR ELPH-MCNC: 2.3 GM/DL
GLUCOSE BLD-MCNC: 97 MG/DL (ref 65–99)
HCT VFR BLD AUTO: 46.3 % (ref 37.5–51)
HGB BLD-MCNC: 16.3 G/DL (ref 13–17.7)
IMM GRANULOCYTES # BLD AUTO: 0.04 10*3/MM3 (ref 0–0.05)
IMM GRANULOCYTES NFR BLD AUTO: 0.7 % (ref 0–0.5)
LYMPHOCYTES # BLD AUTO: 0.65 10*3/MM3 (ref 0.7–3.1)
LYMPHOCYTES NFR BLD AUTO: 11.9 % (ref 19.6–45.3)
MCH RBC QN AUTO: 32.3 PG (ref 26.6–33)
MCHC RBC AUTO-ENTMCNC: 35.2 G/DL (ref 31.5–35.7)
MCV RBC AUTO: 91.7 FL (ref 79–97)
MONOCYTES # BLD AUTO: 0.46 10*3/MM3 (ref 0.1–0.9)
MONOCYTES NFR BLD AUTO: 8.4 % (ref 5–12)
NEUTROPHILS # BLD AUTO: 4.16 10*3/MM3 (ref 1.7–7)
NEUTROPHILS NFR BLD AUTO: 76.1 % (ref 42.7–76)
NRBC BLD AUTO-RTO: 0 /100 WBC (ref 0–0.2)
PLATELET # BLD AUTO: 209 10*3/MM3 (ref 140–450)
PMV BLD AUTO: 9.6 FL (ref 6–12)
POTASSIUM BLD-SCNC: 4.4 MMOL/L (ref 3.5–5.2)
PROT SERPL-MCNC: 6.8 G/DL (ref 6–8.5)
RBC # BLD AUTO: 5.05 10*6/MM3 (ref 4.14–5.8)
SODIUM BLD-SCNC: 138 MMOL/L (ref 136–145)
WBC NRBC COR # BLD: 5.47 10*3/MM3 (ref 3.4–10.8)

## 2020-02-13 PROCEDURE — 36415 COLL VENOUS BLD VENIPUNCTURE: CPT | Performed by: INTERNAL MEDICINE

## 2020-02-13 PROCEDURE — 99214 OFFICE O/P EST MOD 30 MIN: CPT | Performed by: INTERNAL MEDICINE

## 2020-02-13 PROCEDURE — 80053 COMPREHEN METABOLIC PANEL: CPT | Performed by: INTERNAL MEDICINE

## 2020-02-13 PROCEDURE — 86140 C-REACTIVE PROTEIN: CPT | Performed by: INTERNAL MEDICINE

## 2020-02-13 PROCEDURE — 85025 COMPLETE CBC W/AUTO DIFF WBC: CPT | Performed by: INTERNAL MEDICINE

## 2020-02-13 RX ORDER — BUDESONIDE 3 MG/1
9 CAPSULE, COATED PELLETS ORAL DAILY
Qty: 90 CAPSULE | Refills: 1 | Status: SHIPPED | OUTPATIENT
Start: 2020-02-13 | End: 2020-04-16

## 2020-02-13 RX ORDER — METHYLPREDNISOLONE 4 MG/1
TABLET ORAL
Qty: 21 EACH | Refills: 0 | Status: SHIPPED | OUTPATIENT
Start: 2020-02-13 | End: 2020-09-01

## 2020-02-13 NOTE — PROGRESS NOTES
[facility] Gastroenterology    Chief Complaint   Patient presents with   • Crohn's Disease       Subjective     HPI    Vinicio Ring is a 69 y.o. male who presents with a chief complaint of Crohn's      Colonoscopy December 2019 revealed 1 small ulcer in the terminal ileum at the 20 cm parul that appeared to be in a healing phase.  He was scheduled to follow-up today to discuss the option of continuing Imuran or transition to biologic such as Humira.  Since the colonoscopy tells me he had one episode of discomfort at 1 January.  He had abdominal discomfort that lasted for hours then he started a Medrol Dosepak and that relieved his discomfort as it has in the past.  He is tried Bentyl in the past with no improvement in his pain.  He states the only thing that is worked over the last couple years is the Medrol Dosepak.  He states since he took the Medrol Dosepak 1 January is not had any discomfort.  He feels well.  His weight is stable.  He has no complaints.    ==================== November 12, 2019 HPI=================================  HPI     Vinicio Ring is a 69 y.o. male who presents with a chief complaint of Crohn's.     He was last here 6 months ago.  See HPI from that visit copy below.  He tells me he has had 3 episodes of attacks of abdominal discomfort.  The last one was about 2 weeks ago.  He started on a Thursday evening.  He rested but it persisted through Friday so he started on a Medrol Dosepak.  Once he started a Medrol Dosepak it subsided.  The 2 prior episodes he did not need to take the steroid pack.  The acute abdominal cramps lasted only a few hours and then subsided on their own.  He has tried taking the Bentyl in the past for these attacks but is had no change in his symptoms with the Bentyl use.     He currently is back to his normal self.  He denies any constipation diarrhea.  He is eating what he wants to.  His weight is stable.  He had lab work today to include a CBC and CMP  and I reviewed this it was unremarkable.  His labs in May included a Prometheus TP metabolite level and is within therapeutic range for azathioprine.     He continues to see a dermatologist.  He states he has had some excessive skin peeling on his face that his dermatologist diagnosed as rosacea.  He has a cream to take but has not started it yet.  He has no other complaints or nothing else to report he states.        ========================= May 2019 HPI=============================  HPI     Vinicio Ring is a 68 y.o. male who presents with a chief complaint of Crohn's.     He was here in February when he had an episode of acute abdominal pain lasting overnight.  He presented to the hospital but he came to the office as he was starting to feel better.  We treated him Medrol Dosepak.  He felt better.  He states he had a slight episode and that was at the beginning of April.  He did take a Medrol Dosepak then.  And that he feels good.  He states he started exercising in the beginning of April and is done well since then.  He is put on about 3 pounds.  He is got a good appetite.  He has had no abdominal pain.  Has daily bowel months.  Denies blood in stools.  Continues on Imuran.  We did check his metabolite levels in February and they were within normal therapeutic range.    Past Medical History:   Diagnosis Date   • Antral cyst 12/14/2017   • Chronic rhinitis 12/14/2017   • Elevated PSA    • GERD (gastroesophageal reflux disease)    • Rosacea, unspecified        Past Surgical History:   Procedure Laterality Date   • CAPSULE ENDOSCOPY N/A 10/19/2016    Procedure: CAPSULE ENDOSCOPY AGILE;  Surgeon: Hector Mooney MD;  Location: [facility] ENDOSCOPY;  Service:    • CHOLECYSTECTOMY     • CHOLECYSTECTOMY     • COLONOSCOPY N/A 10/19/2016    Procedure: COLONOSCOPY WITH ANESTHESIA;  Surgeon: Hector Mooney MD;  Location: [facility] ENDOSCOPY;  Service:    • COLONOSCOPY N/A 12/17/2019    Procedure: COLONOSCOPY;  Surgeon:  Hector Mooney MD;  Location: [facility] ENDOSCOPY;  Service: Gastroenterology   • ENDOSCOPY N/A 10/19/2016    Procedure: ESOPHAGOGASTRODUODENOSCOPY WITH ANESTHESIA;  Surgeon: Hector Mooney MD;  Location: [facility] ENDOSCOPY;  Service:          Current Outpatient Medications:   •  azaTHIOprine (IMURAN) 50 MG tablet, Take 1 tablet by mouth Daily., Disp: 30 tablet, Rfl: 11  •  baclofen (LIORESAL) 10 MG tablet, Take 1 tablet by mouth At Night As Needed for Muscle Spasms., Disp: 20 tablet, Rfl: 1  •  dicyclomine (BENTYL) 10 MG capsule, Take 1 capsule by mouth 3 (Three) Times a Day As Needed (for abdominal discomfort)., Disp: 90 capsule, Rfl: 6  •  Famotidine (PEPCID AC PO), Take  by mouth Every Night., Disp: , Rfl:   •  fluticasone (FLONASE) 50 MCG/ACT nasal spray, Use 2 sprays in each nostril Daily., Disp: 16 g, Rfl: 11  •  mesalamine (PENTASA) 500 MG CR capsule, Take 2 capsules by mouth 4 (Four) Times a Day., Disp: 720 capsule, Rfl: 3  •  ondansetron (ZOFRAN) 8 MG tablet, Take 1 tablet by mouth Every 8 (Eight) Hours As Needed for Nausea or Vomiting., Disp: 10 tablet, Rfl: 1  •  Budesonide (ENTOCORT EC) 3 MG 24 hr capsule, Take 3 capsules by mouth Daily for 30 days. Then 2 pills daily for 1 month then 1 daily for 1 month then off., Disp: 90 capsule, Rfl: 1  •  fluorouracil (EFUDEX) 5 % cream, Apply to the left forehead and left cheek area -apply up to 2 weeks or until red, crusty, and raw, Disp: 40 g, Rfl: 2  •  fluticasone (FLONASE) 50 MCG/ACT nasal spray, Use 2 sprays in the nostrils daily, Disp: 48 g, Rfl: 0  •  methylPREDNISolone (MEDROL, MIKE,) 4 MG tablet, Take as directed on package instructions., Disp: 21 tablet, Rfl: 0  •  methylPREDNISolone (MEDROL, MIKE,) 4 MG tablet, Take as directed on package instructions., Disp: 21 each, Rfl: 0  •  metroNIDAZOLE (METROGEL) 0.75 % gel, Apply  topically to the appropriate area as directed., Disp: 45 g, Rfl: 3  •  metroNIDAZOLE (METROGEL) 1 % gel, Apply to the right and left  temple area as directed, Disp: 60 g, Rfl: 2  •  Sod Picosulfate-Mag Ox-Cit Acd (CLENPIQ) 10-3.5-12 MG-GM -GM/160ML solution, Take 160 mL by mouth Take As Directed. Take 160 ml the night prior to colonoscopy and another 160 ml 4 hours prior, Disp: 2 bottle, Rfl: 0  •  Sod Picosulfate-Mag Ox-Cit Acd 10-3.5-12 MG-GM -GM/160ML solution, Take as directed on package instructions, Disp: 320 mL, Rfl: 0    No Known Allergies    Social History     Socioeconomic History   • Marital status:      Spouse name: Not on file   • Number of children: Not on file   • Years of education: Not on file   • Highest education level: Not on file   Tobacco Use   • Smoking status: Never Smoker   • Smokeless tobacco: Never Used   Substance and Sexual Activity   • Alcohol use: Yes     Alcohol/week: 14.0 standard drinks     Types: 14 Glasses of wine per week     Comment: daily   • Drug use: No   • Sexual activity: Defer       Family History   Problem Relation Age of Onset   • No Known Problems Father    • No Known Problems Mother    • Colon cancer Maternal Aunt    • Colon polyps Paternal Uncle    • Heart disease Maternal Grandfather    • Heart disease Paternal Grandfather        Review of Systems  General no fever chills or sweats weight stable  Gastrointestinal: Not present-abdominal pain, constipation, diarrhea, dysphagia, hematemesis, melena, odynophagia, nausea, vomiting, pyrosis, regurgitation, hematochezia,    Objective     Vitals:    02/13/20 1459   BP: 128/82   Pulse: 94   Temp: 96.3 °F (35.7 °C)   SpO2: 99%       Physical Exam  No acute distress. Vital signs as documented. Skin warm and dry and without overt rashes. EOMI, sclera anicteric.  Neck without JVD or masses. Lungs clear to auscultation bilaterally, no rales. Heart exam notable for regular rhythm, normal sounds and absence of loud murmurs, rubs or gallops. Abdomen is soft, nontender, non distended, normal bowel sounds and without evidence of organomegaly, masses, or  abdominal aortic enlargement. Extremities nonedematous, no cyanosis. Neuro alert, moves extremities.        Assessment/Plan   Problem List Items Addressed This Visit        Digestive    Crohn's disease of small intestine without complication (CMS/HCC) - Primary    Overview     Diagnosed in 2016.  Symptoms manifest by intermittent intestinal obstruction.  Prometheus positive for IBD.  Colonoscopy December 2019 revealed 1 healing ulcer in the terminal ileum 20 cm from the IC valve         Relevant Orders    C-reactive Protein       Other    Encounter for monitoring immunomodulating therapy    Overview     TPMT enzyme levels are intermediate which can possibly increase risk of toxicity with Imuran at high doses.    T Spot negative June 2017, 2/2019  Test x-ray ordered August 2018  Patient has received the Pneumovax, as well as the shingles vaccine.  He did receive the flu shot for the 2017/18 season.  He will need repeated next season  He sees dermatology, Dr. Taylor, routinely and was advised to continue and why.  Hepatitis A and B vaccinations ordered August 2018, as of 5/9/19 has had a/b vaccinations and is due for 6 month booster in 3 mos.          Relevant Orders    C-reactive Protein            We had a long discussion.  He still had one ulcer on colonoscopy exam.  He has some symptoms which could be from active disease higher up in the small bowel that we cannot identify or possibly even fibrotic tissue.  He does respond to Medrol Dosepak which makes me think it is active disease.  I told him I do not favor treating periodically with a Medrol Dosepak I think once in a blue moon it may be reasonable but not routinely.  I therefore favor changing therapy.  I discussed options.  One we could continue on the course we are doing which I do not strongly favor.  We could transition from azathioprine to a biologic such as Humira.  The third option discussed was continued azathioprine but add a 3-month tapering course of  Entocort.    We had a long discussion about the pros and cons of each option.  We talked about side effects of the medications and benefits of the medicines.  We talked about efficacy of the drugs.  I went into detail with him.  We have had these conversations in the past.  He informed me he still not ready to pursue the Biologics.  He does not want commit to that route.  He wishes to pursue option 3 with continue azathioprine but a tapering course of Entocort.  We did discuss the side effects of Entocort with it being a steroid but a high first-pass metabolism and what that means for side effects.    I will see him back in the office in 3 months.  He is going take Entocort 9 mg daily for 1 month then 6 mg daily for 1 month then 3 mg daily.  We talked about stopping Pentasa.  He wishes to stop the Pentasa and he will do so when this prescription runs out.  I did refill his prescription for Medrol Dosepak to have on standby as this has helped keep him out of the emergency room in the past.    Continue ongoing management by primary care provider and other specialists.     Patient's Body mass index is 24.95 kg/m². BMI is within normal parameters. No follow-up required..        EMR Dragon/transcription disclaimer:  Much of this encounter note is electronic transcription/translation of spoken language to printed text.  The electronic translation of spoken language may be erroneous, or at times, nonsensical words or phrases may be inadvertently transcribed.  Although I have reviewed the note for such errors, some may still exist.    Hector Mooney MD  4:52 PM  02/13/20

## 2020-02-14 LAB — CRP SERPL-MCNC: 0.07 MG/DL (ref 0–0.5)

## 2020-04-16 RX ORDER — BUDESONIDE 3 MG/1
9 CAPSULE, COATED PELLETS ORAL DAILY
Qty: 90 CAPSULE | Refills: 1 | Status: SHIPPED | OUTPATIENT
Start: 2020-04-16 | End: 2020-06-17

## 2020-04-28 DIAGNOSIS — K50.00 CROHN'S DISEASE OF SMALL INTESTINE WITHOUT COMPLICATION (HCC): Primary | ICD-10-CM

## 2020-04-28 DIAGNOSIS — Z79.899 ENCOUNTER FOR MONITORING IMMUNOMODULATING THERAPY: ICD-10-CM

## 2020-04-28 DIAGNOSIS — Z51.81 ENCOUNTER FOR MONITORING IMMUNOMODULATING THERAPY: ICD-10-CM

## 2020-04-28 NOTE — PROGRESS NOTES
Pt is over due for 1 yr TSPOT repeat.   Pt is due for his 3 mth labs CBC & Diff and CMP.   Next visit is 5/12/20 w/Dr. Mooney.

## 2020-05-11 ENCOUNTER — OFFICE VISIT (OUTPATIENT)
Dept: GASTROENTEROLOGY | Facility: CLINIC | Age: 70
End: 2020-05-11

## 2020-05-11 ENCOUNTER — LAB (OUTPATIENT)
Dept: LAB | Facility: HOSPITAL | Age: 70
End: 2020-05-11

## 2020-05-11 VITALS
BODY MASS INDEX: 25.06 KG/M2 | DIASTOLIC BLOOD PRESSURE: 90 MMHG | HEIGHT: 72 IN | TEMPERATURE: 95.8 F | HEART RATE: 83 BPM | SYSTOLIC BLOOD PRESSURE: 134 MMHG | OXYGEN SATURATION: 98 % | WEIGHT: 185 LBS

## 2020-05-11 DIAGNOSIS — K50.00 CROHN'S DISEASE OF SMALL INTESTINE WITHOUT COMPLICATION (HCC): Primary | ICD-10-CM

## 2020-05-11 DIAGNOSIS — Z51.81 ENCOUNTER FOR MONITORING IMMUNOMODULATING THERAPY: ICD-10-CM

## 2020-05-11 DIAGNOSIS — K50.00 CROHN'S DISEASE OF SMALL INTESTINE WITHOUT COMPLICATION (HCC): ICD-10-CM

## 2020-05-11 DIAGNOSIS — Z79.899 ENCOUNTER FOR MONITORING IMMUNOMODULATING THERAPY: ICD-10-CM

## 2020-05-11 LAB
BASOPHILS # BLD AUTO: 0.04 10*3/MM3 (ref 0–0.2)
BASOPHILS NFR BLD AUTO: 0.7 % (ref 0–1.5)
CRP SERPL-MCNC: 0.07 MG/DL (ref 0–0.5)
DEPRECATED RDW RBC AUTO: 45.1 FL (ref 37–54)
EOSINOPHIL # BLD AUTO: 0.09 10*3/MM3 (ref 0–0.4)
EOSINOPHIL NFR BLD AUTO: 1.6 % (ref 0.3–6.2)
ERYTHROCYTE [DISTWIDTH] IN BLOOD BY AUTOMATED COUNT: 13.2 % (ref 12.3–15.4)
HCT VFR BLD AUTO: 43.5 % (ref 37.5–51)
HGB BLD-MCNC: 15.2 G/DL (ref 13–17.7)
IMM GRANULOCYTES # BLD AUTO: 0.04 10*3/MM3 (ref 0–0.05)
IMM GRANULOCYTES NFR BLD AUTO: 0.7 % (ref 0–0.5)
LYMPHOCYTES # BLD AUTO: 0.73 10*3/MM3 (ref 0.7–3.1)
LYMPHOCYTES NFR BLD AUTO: 13.3 % (ref 19.6–45.3)
MCH RBC QN AUTO: 32.8 PG (ref 26.6–33)
MCHC RBC AUTO-ENTMCNC: 34.9 G/DL (ref 31.5–35.7)
MCV RBC AUTO: 94 FL (ref 79–97)
MONOCYTES # BLD AUTO: 0.48 10*3/MM3 (ref 0.1–0.9)
MONOCYTES NFR BLD AUTO: 8.7 % (ref 5–12)
NEUTROPHILS # BLD AUTO: 4.11 10*3/MM3 (ref 1.7–7)
NEUTROPHILS NFR BLD AUTO: 75 % (ref 42.7–76)
NRBC BLD AUTO-RTO: 0 /100 WBC (ref 0–0.2)
PLATELET # BLD AUTO: 186 10*3/MM3 (ref 140–450)
PMV BLD AUTO: 10.7 FL (ref 6–12)
RBC # BLD AUTO: 4.63 10*6/MM3 (ref 4.14–5.8)
WBC NRBC COR # BLD: 5.49 10*3/MM3 (ref 3.4–10.8)

## 2020-05-11 PROCEDURE — 36415 COLL VENOUS BLD VENIPUNCTURE: CPT | Performed by: INTERNAL MEDICINE

## 2020-05-11 PROCEDURE — 99214 OFFICE O/P EST MOD 30 MIN: CPT | Performed by: INTERNAL MEDICINE

## 2020-05-11 PROCEDURE — 85025 COMPLETE CBC W/AUTO DIFF WBC: CPT | Performed by: INTERNAL MEDICINE

## 2020-05-11 PROCEDURE — 86140 C-REACTIVE PROTEIN: CPT | Performed by: INTERNAL MEDICINE

## 2020-05-11 NOTE — PROGRESS NOTES
[facility]    Chief Complaint   Patient presents with   • Crohn's Disease       Subjective     HPI    Vinicio Ring is a 69 y.o. male who presents with a chief complaint of Crohn's    When he was here in February we discussed the options of step up therapy to Humira and Imuran.  We discussed other options.  He chose not to pursue a biologic but to add Entocort to the Imuran.  See a copy of the assessment plan from that visit below.    He tells me he did well with the Entocort.  He had one episode of abdominal discomfort that was mild and lasted about 3 hours.  This started a couple days after he decreased the dose from 2 tablets daily 1 tablet daily.  That is the only time he had any symptoms.  Otherwise he has been doing well.  He feels like he is gained a little bit of weight and is up 1 pound from February.  He is up about 4 pounds since December.    He denies any fever chills or sweats.  He has had no diarrhea.  He has had no nausea vomiting or than one little episode.  He has 11 days ago on the once a day Entocort.  He tells me he still working.  He is able to do most of his work from home at the [place] [employer].  He has been self isolating for the most part.      ============================== February 13, 2020 assessment plan ========================================        We had a long discussion.  He still had one ulcer on colonoscopy exam.  He has some symptoms which could be from active disease higher up in the small bowel that we cannot identify or possibly even fibrotic tissue.  He does respond to Medrol Dosepak which makes me think it is active disease.  I told him I do not favor treating periodically with a Medrol Dosepak I think once in a blue moon it may be reasonable but not routinely.  I therefore favor changing therapy.  I discussed options.  One we could continue on the course we are doing which I do not strongly favor.  We could transition from azathioprine to a biologic  such as Humira.  The third option discussed was continued azathioprine but add a 3-month tapering course of Entocort.     We had a long discussion about the pros and cons of each option.  We talked about side effects of the medications and benefits of the medicines.  We talked about efficacy of the drugs.  I went into detail with him.  We have had these conversations in the past.  He informed me he still not ready to pursue the Biologics.  He does not want commit to that route.  He wishes to pursue option 3 with continue azathioprine but a tapering course of Entocort.  We did discuss the side effects of Entocort with it being a steroid but a high first-pass metabolism and what that means for side effects.     I will see him back in the office in 3 months.  He is going take Entocort 9 mg daily for 1 month then 6 mg daily for 1 month then 3 mg daily.  We talked about stopping Pentasa.  He wishes to stop the Pentasa and he will do so when this prescription runs out.  I did refill his prescription for Medrol Dosepak to have on standby as this has helped keep him out of the emergency room in the past.     Continue ongoing management by primary care provider and other specialists.     Past Medical History:   Diagnosis Date   • Antral cyst 12/14/2017   • Chronic rhinitis 12/14/2017   • Elevated PSA    • GERD (gastroesophageal reflux disease)    • Rosacea, unspecified        Past Surgical History:   Procedure Laterality Date   • CAPSULE ENDOSCOPY N/A 10/19/2016    Procedure: CAPSULE ENDOSCOPY AGILE;  Surgeon: Hector Mooney MD;  Location: [facility] ENDOSCOPY;  Service:    • CHOLECYSTECTOMY     • CHOLECYSTECTOMY     • COLONOSCOPY N/A 10/19/2016    Procedure: COLONOSCOPY WITH ANESTHESIA;  Surgeon: Hector Mooney MD;  Location: [facility] ENDOSCOPY;  Service:    • COLONOSCOPY N/A 12/17/2019    Procedure: COLONOSCOPY;  Surgeon: Hector Mooney MD;  Location: [facility] ENDOSCOPY;  Service: Gastroenterology   • ENDOSCOPY N/A  10/19/2016    Procedure: ESOPHAGOGASTRODUODENOSCOPY WITH ANESTHESIA;  Surgeon: Hector Mooney MD;  Location: [facility] ENDOSCOPY;  Service:          Current Outpatient Medications:   •  azaTHIOprine (IMURAN) 50 MG tablet, Take 1 tablet by mouth Daily., Disp: 30 tablet, Rfl: 11  •  Budesonide (ENTOCORT EC) 3 MG 24 hr capsule, Take 3 capsules by mouth Daily for 30 days. Then 2 pills daily for 1 month then 1 daily for 1 month then off., Disp: 90 capsule, Rfl: 1  •  Famotidine (PEPCID AC PO), Take  by mouth Every Night., Disp: , Rfl:   •  fluticasone (FLONASE) 50 MCG/ACT nasal spray, Use 2 sprays in each nostril Daily., Disp: 16 g, Rfl: 11  •  fluticasone (FLONASE) 50 MCG/ACT nasal spray, Use 2 sprays in the nostrils daily, Disp: 48 g, Rfl: 0  •  mesalamine (PENTASA) 500 MG CR capsule, Take 2 capsules by mouth 4 (Four) Times a Day., Disp: 720 capsule, Rfl: 3  •  methylPREDNISolone (MEDROL, MIKE,) 4 MG tablet, Take as directed on package instructions. (Patient taking differently: As Needed. Take as directed on package instructions.), Disp: 21 tablet, Rfl: 0  •  ondansetron (ZOFRAN) 8 MG tablet, Take 1 tablet by mouth Every 8 (Eight) Hours As Needed for Nausea or Vomiting., Disp: 10 tablet, Rfl: 1  •  baclofen (LIORESAL) 10 MG tablet, Take 1 tablet by mouth At Night As Needed for Muscle Spasms., Disp: 20 tablet, Rfl: 1  •  dicyclomine (BENTYL) 10 MG capsule, Take 1 capsule by mouth 3 (Three) Times a Day As Needed (for abdominal discomfort)., Disp: 90 capsule, Rfl: 6  •  fluorouracil (EFUDEX) 5 % cream, Apply to the left forehead and left cheek area -apply up to 2 weeks or until red, crusty, and raw, Disp: 40 g, Rfl: 2  •  methylPREDNISolone (MEDROL, MIKE,) 4 MG tablet, Take as directed on package instructions., Disp: 21 each, Rfl: 0  •  metroNIDAZOLE (METROGEL) 0.75 % gel, Apply  topically to the appropriate area as directed., Disp: 45 g, Rfl: 3  •  metroNIDAZOLE (METROGEL) 1 % gel, Apply to the right and left temple area  as directed, Disp: 60 g, Rfl: 2  •  Sod Picosulfate-Mag Ox-Cit Acd (CLENPIQ) 10-3.5-12 MG-GM -GM/160ML solution, Take 160 mL by mouth Take As Directed. Take 160 ml the night prior to colonoscopy and another 160 ml 4 hours prior, Disp: 2 bottle, Rfl: 0  •  Sod Picosulfate-Mag Ox-Cit Acd 10-3.5-12 MG-GM -GM/160ML solution, Take as directed on package instructions, Disp: 320 mL, Rfl: 0    No Known Allergies    Social History     Socioeconomic History   • Marital status:      Spouse name: Not on file   • Number of children: Not on file   • Years of education: Not on file   • Highest education level: Not on file   Tobacco Use   • Smoking status: Never Smoker   • Smokeless tobacco: Never Used   Substance and Sexual Activity   • Alcohol use: Yes     Alcohol/week: 14.0 standard drinks     Types: 14 Glasses of wine per week     Comment: daily   • Drug use: No   • Sexual activity: Defer       Family History   Problem Relation Age of Onset   • No Known Problems Father    • No Known Problems Mother    • Colon cancer Maternal Aunt    • Colon polyps Paternal Uncle    • Heart disease Maternal Grandfather    • Heart disease Paternal Grandfather        Review of Systems  General no fever chills or sweats weight stable  Gastrointestinal: Not present-abdominal pain, constipation, diarrhea, dysphagia, hematemesis, melena, odynophagia, nausea, vomiting, pyrosis, regurgitation, hematochezia,    Objective     Vitals:    05/11/20 1039   BP: 134/90   Pulse: 83   Temp: 95.8 °F (35.4 °C)   SpO2: 98%       Physical Exam  No acute distress. Vital signs as documented. Skin warm and dry and without overt rashes. EOMI, sclera anicteric.  Neck without JVD or masses. Lungs clear to auscultation bilaterally, no rales. Heart exam notable for regular rhythm, normal sounds and absence of loud murmurs, rubs or gallops. Abdomen is soft, nontender, non distended, normal bowel sounds and without evidence of organomegaly, masses, or abdominal aortic  enlargement. Extremities nonedematous, no cyanosis. Neuro alert, moves extremities.        Assessment/Plan   Problem List Items Addressed This Visit        Digestive    Crohn's disease of small intestine without complication (CMS/HCC) - Primary    Overview     Diagnosed in 2016.  Symptoms manifest by intermittent intestinal obstruction.  Prometheus positive for IBD.  Colonoscopy December 2019 revealed 1 healing ulcer in the terminal ileum 20 cm from the IC valve         Relevant Orders    C-reactive Protein       Other    Encounter for monitoring immunomodulating therapy    Overview     TPMT enzyme levels are intermediate which can possibly increase risk of toxicity with Imuran at high doses.    T Spot negative June 2017, 2/2019  Test x-ray ordered August 2018  Patient has received the Pneumovax, as well as the shingles vaccine.  He did receive the flu shot for the 2017/18 season.  He will need repeated next season  He sees dermatology, Dr. Taylor, routinely and was advised to continue and why.  Hepatitis A and B vaccinations ordered August 2018, as of 5/9/19 has had a/b vaccinations and is due for 6 month booster in 3 mos.                  Clinically stable.  We once again talked about the options.  We talked about going on Humira and stepping up therapy.  He has had a response to Entocort along with Imuran.  He would like to see how this plays out.  I think that is reasonable.  I did advise him to count out 7 tablets of Entocort and the last 7 tablets I want him to take every other day until they are gone.  He expressed understanding is going to do this.  He will continue his Imuran.  He is going come back and see us in early September for follow-up.  He will contact us sooner if he has any troubles.  He is due for labs and he will get a CBC, CMP and T spot today while is here at the hospital.    Of note we did talk about the side effects of Entocort and steroids.  We talked about the moon face is gaining weight,  thinning bones immunosuppression etc.  We talked about side effects of Humira.  Talked about the pluses and minuses of each option of therapy again.    Continue ongoing management by primary care provider and other specialists.     Patient's Body mass index is 25.09 kg/m². BMI is within normal parameters. No follow-up required..  I did talk to him about exercising and getting out walking and eating healthy.        EMR Dragon/transcription disclaimer:  Much of this encounter note is electronic transcription/translation of spoken language to printed text.  The electronic translation of spoken language may be erroneous, or at times, nonsensical words or phrases may be inadvertently transcribed.  Although I have reviewed the note for such errors, some may still exist.    Hector Mooney MD  11:42  05/11/20

## 2020-05-13 ENCOUNTER — TELEPHONE (OUTPATIENT)
Dept: GASTROENTEROLOGY | Facility: CLINIC | Age: 70
End: 2020-05-13

## 2020-05-13 NOTE — TELEPHONE ENCOUNTER
He had a T spot and a CMP that was also in the computer to be done.  He had the CBC done.  I have that result as well as a C-reactive protein.  Can you contact the lab and find out where the CMP and T spot are

## 2020-05-18 RX ORDER — AZATHIOPRINE 50 MG/1
50 TABLET ORAL DAILY
Qty: 30 TABLET | Refills: 11 | Status: SHIPPED | OUTPATIENT
Start: 2020-05-18 | End: 2021-05-13

## 2020-05-19 ENCOUNTER — LAB (OUTPATIENT)
Dept: LAB | Facility: HOSPITAL | Age: 70
End: 2020-05-19

## 2020-05-19 DIAGNOSIS — Z51.81 ENCOUNTER FOR MONITORING IMMUNOMODULATING THERAPY: ICD-10-CM

## 2020-05-19 DIAGNOSIS — Z79.899 ENCOUNTER FOR MONITORING IMMUNOMODULATING THERAPY: ICD-10-CM

## 2020-05-19 DIAGNOSIS — K50.00 CROHN'S DISEASE OF SMALL INTESTINE WITHOUT COMPLICATION (HCC): ICD-10-CM

## 2020-05-19 LAB
ALBUMIN SERPL-MCNC: 4.3 G/DL (ref 3.5–5.2)
ALBUMIN/GLOB SERPL: 1.8 G/DL
ALP SERPL-CCNC: 106 U/L (ref 39–117)
ALT SERPL W P-5'-P-CCNC: 14 U/L (ref 1–41)
ANION GAP SERPL CALCULATED.3IONS-SCNC: 10 MMOL/L (ref 5–15)
AST SERPL-CCNC: 16 U/L (ref 1–40)
BILIRUB SERPL-MCNC: 0.5 MG/DL (ref 0.2–1.2)
BUN BLD-MCNC: 11 MG/DL (ref 8–23)
BUN/CREAT SERPL: 12.6 (ref 7–25)
CALCIUM SPEC-SCNC: 9.3 MG/DL (ref 8.6–10.5)
CHLORIDE SERPL-SCNC: 102 MMOL/L (ref 98–107)
CO2 SERPL-SCNC: 30 MMOL/L (ref 22–29)
CREAT BLD-MCNC: 0.87 MG/DL (ref 0.76–1.27)
GFR SERPL CREATININE-BSD FRML MDRD: 88 ML/MIN/1.73
GLOBULIN UR ELPH-MCNC: 2.4 GM/DL
GLUCOSE BLD-MCNC: 91 MG/DL (ref 65–99)
POTASSIUM BLD-SCNC: 3.9 MMOL/L (ref 3.5–5.2)
PROT SERPL-MCNC: 6.7 G/DL (ref 6–8.5)
SODIUM BLD-SCNC: 142 MMOL/L (ref 136–145)

## 2020-05-19 PROCEDURE — 36415 COLL VENOUS BLD VENIPUNCTURE: CPT

## 2020-05-19 PROCEDURE — 80053 COMPREHEN METABOLIC PANEL: CPT | Performed by: INTERNAL MEDICINE

## 2020-05-19 PROCEDURE — 86481 TB AG RESPONSE T-CELL SUSP: CPT | Performed by: INTERNAL MEDICINE

## 2020-05-21 LAB
TSPOT INTERPRETATION: NEGATIVE
TSPOT NIL CONTROL INTERPRETATION: NORMAL
TSPOT PANEL A: 0
TSPOT PANEL B: 1
TSPOT POS CONTROL INTERPRETATION: NORMAL

## 2020-06-11 NOTE — PATIENT INSTRUCTIONS
Fluticasone nasal spray  Call or return for problems  Follow-up in 6-8 weeks with CT scan paranasal sinuses without contrast     Instructions: This plan will send the code FBSE to the PM system.  DO NOT or CHANGE the price. Price (Do Not Change): 0.00 Detail Level: Simple No

## 2020-06-17 RX ORDER — BUDESONIDE 3 MG/1
9 CAPSULE, COATED PELLETS ORAL DAILY
Qty: 90 CAPSULE | Refills: 1 | Status: SHIPPED | OUTPATIENT
Start: 2020-06-17 | End: 2020-07-31

## 2020-08-12 DIAGNOSIS — K50.00 CROHN'S DISEASE OF SMALL INTESTINE WITHOUT COMPLICATION (HCC): Primary | ICD-10-CM

## 2020-08-25 ENCOUNTER — TRANSCRIBE ORDERS (OUTPATIENT)
Dept: ADMINISTRATIVE | Facility: HOSPITAL | Age: 70
End: 2020-08-25

## 2020-08-25 ENCOUNTER — TRANSCRIBE ORDERS (OUTPATIENT)
Dept: OTOLARYNGOLOGY | Facility: CLINIC | Age: 70
End: 2020-08-25

## 2020-08-25 DIAGNOSIS — Z01.818 PREOP TESTING: Primary | ICD-10-CM

## 2020-08-26 ENCOUNTER — LAB (OUTPATIENT)
Dept: LAB | Facility: HOSPITAL | Age: 70
End: 2020-08-26

## 2020-08-26 ENCOUNTER — TRANSCRIBE ORDERS (OUTPATIENT)
Dept: ADMINISTRATIVE | Facility: HOSPITAL | Age: 70
End: 2020-08-26

## 2020-08-26 DIAGNOSIS — E29.1 3-OXO-5 ALPHA-STEROID DELTA 4-DEHYDROGENASE DEFICIENCY: ICD-10-CM

## 2020-08-26 DIAGNOSIS — Z00.00 ROUTINE GENERAL MEDICAL EXAMINATION AT A HEALTH CARE FACILITY: Primary | ICD-10-CM

## 2020-08-26 DIAGNOSIS — K50.00 CROHN'S DISEASE OF SMALL INTESTINE WITHOUT COMPLICATION (HCC): ICD-10-CM

## 2020-08-26 DIAGNOSIS — E53.8 BIOTIN-(PROPIONYL-COA-CARBOXYLASE) LIGASE DEFICIENCY: ICD-10-CM

## 2020-08-26 DIAGNOSIS — D51.0 PERNICIOUS ANEMIA: ICD-10-CM

## 2020-08-26 DIAGNOSIS — Z12.5 SPECIAL SCREENING FOR MALIGNANT NEOPLASM OF PROSTATE: ICD-10-CM

## 2020-08-26 DIAGNOSIS — Z00.00 ROUTINE GENERAL MEDICAL EXAMINATION AT A HEALTH CARE FACILITY: ICD-10-CM

## 2020-08-26 LAB
ALBUMIN SERPL-MCNC: 4.3 G/DL (ref 3.5–5.2)
ALBUMIN/GLOB SERPL: 2 G/DL
ALP SERPL-CCNC: 93 U/L (ref 39–117)
ALT SERPL W P-5'-P-CCNC: 13 U/L (ref 1–41)
ANION GAP SERPL CALCULATED.3IONS-SCNC: 6.7 MMOL/L (ref 5–15)
AST SERPL-CCNC: 17 U/L (ref 1–40)
BASOPHILS # BLD AUTO: 0.03 10*3/MM3 (ref 0–0.2)
BASOPHILS NFR BLD AUTO: 0.7 % (ref 0–1.5)
BILIRUB SERPL-MCNC: 0.6 MG/DL (ref 0–1.2)
BILIRUB UR QL STRIP: NEGATIVE
BUN SERPL-MCNC: 11 MG/DL (ref 8–23)
BUN/CREAT SERPL: 10.9 (ref 7–25)
CALCIUM SPEC-SCNC: 9.1 MG/DL (ref 8.6–10.5)
CHLORIDE SERPL-SCNC: 107 MMOL/L (ref 98–107)
CHOLEST SERPL-MCNC: 173 MG/DL (ref 0–200)
CLARITY UR: CLEAR
CO2 SERPL-SCNC: 26.3 MMOL/L (ref 22–29)
COLOR UR: YELLOW
CREAT SERPL-MCNC: 1.01 MG/DL (ref 0.76–1.27)
DEPRECATED RDW RBC AUTO: 42.1 FL (ref 37–54)
EOSINOPHIL # BLD AUTO: 0.09 10*3/MM3 (ref 0–0.4)
EOSINOPHIL NFR BLD AUTO: 2.2 % (ref 0.3–6.2)
ERYTHROCYTE [DISTWIDTH] IN BLOOD BY AUTOMATED COUNT: 12.1 % (ref 12.3–15.4)
GFR SERPL CREATININE-BSD FRML MDRD: 74 ML/MIN/1.73
GLOBULIN UR ELPH-MCNC: 2.2 GM/DL
GLUCOSE SERPL-MCNC: 101 MG/DL (ref 65–99)
GLUCOSE UR STRIP-MCNC: NEGATIVE MG/DL
HCT VFR BLD AUTO: 48.4 % (ref 37.5–51)
HDLC SERPL-MCNC: 49 MG/DL (ref 40–60)
HGB BLD-MCNC: 16.8 G/DL (ref 13–17.7)
HGB UR QL STRIP.AUTO: NEGATIVE
IMM GRANULOCYTES # BLD AUTO: 0.01 10*3/MM3 (ref 0–0.05)
IMM GRANULOCYTES NFR BLD AUTO: 0.2 % (ref 0–0.5)
KETONES UR QL STRIP: NEGATIVE
LDLC SERPL CALC-MCNC: 106 MG/DL (ref 0–100)
LDLC/HDLC SERPL: 2.16 {RATIO}
LEUKOCYTE ESTERASE UR QL STRIP.AUTO: NEGATIVE
LYMPHOCYTES # BLD AUTO: 0.58 10*3/MM3 (ref 0.7–3.1)
LYMPHOCYTES NFR BLD AUTO: 14.1 % (ref 19.6–45.3)
MCH RBC QN AUTO: 32.5 PG (ref 26.6–33)
MCHC RBC AUTO-ENTMCNC: 34.7 G/DL (ref 31.5–35.7)
MCV RBC AUTO: 93.6 FL (ref 79–97)
MONOCYTES # BLD AUTO: 0.34 10*3/MM3 (ref 0.1–0.9)
MONOCYTES NFR BLD AUTO: 8.3 % (ref 5–12)
NEUTROPHILS NFR BLD AUTO: 3.06 10*3/MM3 (ref 1.7–7)
NEUTROPHILS NFR BLD AUTO: 74.5 % (ref 42.7–76)
NITRITE UR QL STRIP: NEGATIVE
NRBC BLD AUTO-RTO: 0 /100 WBC (ref 0–0.2)
PH UR STRIP.AUTO: 7.5 [PH] (ref 5–8)
PLATELET # BLD AUTO: 188 10*3/MM3 (ref 140–450)
PMV BLD AUTO: 10.1 FL (ref 6–12)
POTASSIUM SERPL-SCNC: 4.1 MMOL/L (ref 3.5–5.2)
PROT SERPL-MCNC: 6.5 G/DL (ref 6–8.5)
PROT UR QL STRIP: NEGATIVE
PSA SERPL-MCNC: 5.31 NG/ML (ref 0–4)
RBC # BLD AUTO: 5.17 10*6/MM3 (ref 4.14–5.8)
SODIUM SERPL-SCNC: 140 MMOL/L (ref 136–145)
SP GR UR STRIP: 1.01 (ref 1–1.03)
TESTOST SERPL-MCNC: 469 NG/DL (ref 193–740)
TRIGL SERPL-MCNC: 90 MG/DL (ref 0–150)
UROBILINOGEN UR QL STRIP: NORMAL
VIT B12 BLD-MCNC: 320 PG/ML (ref 211–946)
VLDLC SERPL-MCNC: 18 MG/DL (ref 5–40)
WBC # BLD AUTO: 4.11 10*3/MM3 (ref 3.4–10.8)

## 2020-08-26 PROCEDURE — 80061 LIPID PANEL: CPT | Performed by: INTERNAL MEDICINE

## 2020-08-26 PROCEDURE — 80053 COMPREHEN METABOLIC PANEL: CPT | Performed by: INTERNAL MEDICINE

## 2020-08-26 PROCEDURE — 36415 COLL VENOUS BLD VENIPUNCTURE: CPT

## 2020-08-26 PROCEDURE — 84153 ASSAY OF PSA TOTAL: CPT | Performed by: INTERNAL MEDICINE

## 2020-08-26 PROCEDURE — 84403 ASSAY OF TOTAL TESTOSTERONE: CPT | Performed by: INTERNAL MEDICINE

## 2020-08-26 PROCEDURE — 81003 URINALYSIS AUTO W/O SCOPE: CPT | Performed by: INTERNAL MEDICINE

## 2020-08-26 PROCEDURE — 85025 COMPLETE CBC W/AUTO DIFF WBC: CPT | Performed by: INTERNAL MEDICINE

## 2020-08-26 PROCEDURE — 82607 VITAMIN B-12: CPT | Performed by: INTERNAL MEDICINE

## 2020-08-28 ENCOUNTER — LAB (OUTPATIENT)
Dept: LAB | Facility: HOSPITAL | Age: 70
End: 2020-08-28

## 2020-08-28 PROCEDURE — U0003 INFECTIOUS AGENT DETECTION BY NUCLEIC ACID (DNA OR RNA); SEVERE ACUTE RESPIRATORY SYNDROME CORONAVIRUS 2 (SARS-COV-2) (CORONAVIRUS DISEASE [COVID-19]), AMPLIFIED PROBE TECHNIQUE, MAKING USE OF HIGH THROUGHPUT TECHNOLOGIES AS DESCRIBED BY CMS-2020-01-R: HCPCS | Performed by: OTOLARYNGOLOGY

## 2020-08-28 PROCEDURE — C9803 HOPD COVID-19 SPEC COLLECT: HCPCS | Performed by: OTOLARYNGOLOGY

## 2020-08-29 LAB
COVID LABCORP PRIORITY: NORMAL
SARS-COV-2 RNA RESP QL NAA+PROBE: NOT DETECTED

## 2020-09-01 ENCOUNTER — OFFICE VISIT (OUTPATIENT)
Dept: OTOLARYNGOLOGY | Facility: CLINIC | Age: 70
End: 2020-09-01

## 2020-09-01 VITALS
WEIGHT: 185.9 LBS | BODY MASS INDEX: 25.18 KG/M2 | TEMPERATURE: 98.2 F | SYSTOLIC BLOOD PRESSURE: 134 MMHG | HEIGHT: 72 IN | DIASTOLIC BLOOD PRESSURE: 91 MMHG | HEART RATE: 77 BPM

## 2020-09-01 DIAGNOSIS — J33.0 NASAL POLYP, BENIGN: Primary | ICD-10-CM

## 2020-09-01 DIAGNOSIS — J34.1: ICD-10-CM

## 2020-09-01 DIAGNOSIS — J34.2 NASAL SEPTAL DEVIATION: ICD-10-CM

## 2020-09-01 PROCEDURE — 99214 OFFICE O/P EST MOD 30 MIN: CPT | Performed by: PHYSICIAN ASSISTANT

## 2020-09-01 NOTE — PATIENT INSTRUCTIONS
Symptoms have been stable for the last 3-4 years, no changes noted on exam or CT's. Follow-up as needed.

## 2020-09-03 ENCOUNTER — TRANSCRIBE ORDERS (OUTPATIENT)
Dept: ADMINISTRATIVE | Facility: HOSPITAL | Age: 70
End: 2020-09-03

## 2020-09-03 ENCOUNTER — HOSPITAL ENCOUNTER (OUTPATIENT)
Dept: GENERAL RADIOLOGY | Facility: HOSPITAL | Age: 70
Discharge: HOME | End: 2020-09-03
Admitting: INTERNAL MEDICINE

## 2020-09-03 DIAGNOSIS — M25.559 PAIN IN HIP: ICD-10-CM

## 2020-09-03 DIAGNOSIS — M25.559 PAIN IN HIP: Primary | ICD-10-CM

## 2020-09-03 PROCEDURE — 73502 X-RAY EXAM HIP UNI 2-3 VIEWS: CPT

## 2020-09-03 PROCEDURE — 72110 X-RAY EXAM L-2 SPINE 4/>VWS: CPT

## 2020-09-21 ENCOUNTER — OFFICE VISIT (OUTPATIENT)
Dept: GASTROENTEROLOGY | Facility: CLINIC | Age: 70
End: 2020-09-21

## 2020-09-21 VITALS
HEART RATE: 81 BPM | DIASTOLIC BLOOD PRESSURE: 90 MMHG | TEMPERATURE: 97.3 F | OXYGEN SATURATION: 98 % | SYSTOLIC BLOOD PRESSURE: 144 MMHG | WEIGHT: 186 LBS | BODY MASS INDEX: 25.19 KG/M2 | HEIGHT: 72 IN

## 2020-09-21 DIAGNOSIS — Z51.81 ENCOUNTER FOR MONITORING IMMUNOMODULATING THERAPY: ICD-10-CM

## 2020-09-21 DIAGNOSIS — Z79.899 ENCOUNTER FOR MONITORING IMMUNOMODULATING THERAPY: ICD-10-CM

## 2020-09-21 DIAGNOSIS — K50.00 CROHN'S DISEASE OF SMALL INTESTINE WITHOUT COMPLICATION (HCC): Primary | ICD-10-CM

## 2020-09-21 PROCEDURE — 99214 OFFICE O/P EST MOD 30 MIN: CPT | Performed by: INTERNAL MEDICINE

## 2020-09-21 NOTE — PROGRESS NOTES
[facility]astroenter[facility]    Chief Complaint   Patient presents with   • Crohn's Disease       Subjective     HPI    Vinicio Ring is a 69 y.o. male who presents with a chief complaint of Crohn's    When he was here in May was having improvement after tapering off Entocort.  I talked to him about going to every other day with the Entocort for 14 days then off.  We have talked about step up therapy.  We talked about the option of Humira.  He wished to see how things went.  He presents now for follow-up evaluation.    He tells me he is doing well.  He is not had use any steroids since he being on Entocort.  He finished Entocort in May.  He states he may get a little twinge of abdominal discomfort but nothing that he can handle.  Nothing that he has to take any medications for.  Occasionally has a little bit of right flank pain.  Denies any blood in his urine.  Denies diarrhea or blood in stools.  Has had no fever chills sweats.  He is gained 1 or 2 pounds.  He overall feels well.    During this coronavirus, he has been self isolating.  He still working but he is able to do most of his work from home.  He is wearing a mask when he is out.    =================== May 11, 2020 HPI===================================================  HPI     Vinicio Ring is a 69 y.o. male who presents with a chief complaint of Crohn's     When he was here in February we discussed the options of step up therapy to Humira and Imuran.  We discussed other options.  He chose not to pursue a biologic but to add Entocort to the Imuran.  See a copy of the assessment plan from that visit below.     He tells me he did well with the Entocort.  He had one episode of abdominal discomfort that was mild and lasted about 3 hours.  This started a couple days after he decreased the dose from 2 tablets daily 1 tablet daily.  That is the only time he had any symptoms.  Otherwise he has been doing well.  He feels like he is gained a little bit  of weight and is up 1 pound from February.  He is up about 4 pounds since December.     He denies any fever chills or sweats.  He has had no diarrhea.  He has had no nausea vomiting or than one little episode.  He has 11 days ago on the once a day Entocort.  He tells me he still working.  He is able to do most of his work from [place] the [place] [employer].  He has been self isolating for the most part.        ============================== February 13, 2020 assessment plan ========================================        We had a long discussion.  He still had one ulcer on colonoscopy exam.  He has some symptoms which could be from active disease higher up in the small bowel that we cannot identify or possibly even fibrotic tissue.  He does respond to Medrol Dosepak which makes me think it is active disease.  I told him I do not favor treating periodically with a Medrol Dosepak I think once in a blue moon it may be reasonable but not routinely.  I therefore favor changing therapy.  I discussed options.  One we could continue on the course we are doing which I do not strongly favor.  We could transition from azathioprine to a biologic such as Humira.  The third option discussed was continued azathioprine but add a 3-month tapering course of Entocort.     We had a long discussion about the pros and cons of each option.  We talked about side effects of the medications and benefits of the medicines.  We talked about efficacy of the drugs.  I went into detail with him.  We have had these conversations in the past.  He informed me he still not ready to pursue the Biologics.  He does not want commit to that route.  He wishes to pursue option 3 with continue azathioprine but a tapering course of Entocort.  We did discuss the side effects of Entocort with it being a steroid but a high first-pass metabolism and what that means for side effects.     I will see him back in the office in 3 months.  He is going take Entocort 9 mg daily  for 1 month then 6 mg daily for 1 month then 3 mg daily.  We talked about stopping Pentasa.  He wishes to stop the Pentasa and he will do so when this prescription runs out.  I did refill his prescription for Medrol Dosepak to have on standby as this has helped keep him out of the emergency room in the past.     Continue ongoing management by primary care provider and other specialists.   Past Medical History:   Diagnosis Date   • Antral cyst 12/14/2017   • Arthritis    • Chronic rhinitis 12/14/2017   • Elevated PSA    • GERD (gastroesophageal reflux disease)    • Rosacea, unspecified        Past Surgical History:   Procedure Laterality Date   • CAPSULE ENDOSCOPY N/A 10/19/2016    Procedure: CAPSULE ENDOSCOPY AGILE;  Surgeon: Hector Mooney MD;  Location:  [facility] ENDOSCOPY;  Service:    • CHOLECYSTECTOMY     • CHOLECYSTECTOMY     • COLONOSCOPY N/A 10/19/2016    Procedure: COLONOSCOPY WITH ANESTHESIA;  Surgeon: Hector Mooney MD;  Location:  [facility] ENDOSCOPY;  Service:    • COLONOSCOPY N/A 12/17/2019    Procedure: COLONOSCOPY;  Surgeon: Hector Mooney MD;  Location:  [facility] ENDOSCOPY;  Service: Gastroenterology   • ENDOSCOPY N/A 10/19/2016    Procedure: ESOPHAGOGASTRODUODENOSCOPY WITH ANESTHESIA;  Surgeon: Hector Mooney MD;  Location:  [facility] ENDOSCOPY;  Service:          Current Outpatient Medications:   •  azaTHIOprine (IMURAN) 50 MG tablet, Take 1 tablet by mouth Daily., Disp: 30 tablet, Rfl: 11  •  Famotidine (PEPCID AC PO), Take  by mouth Every Night., Disp: , Rfl:   •  fluticasone (FLONASE) 50 MCG/ACT nasal spray, Use 2 sprays in each nostril Daily., Disp: 16 g, Rfl: 11  •  ondansetron (ZOFRAN) 8 MG tablet, Take 1 tablet by mouth Every 8 (Eight) Hours As Needed for Nausea or Vomiting., Disp: 10 tablet, Rfl: 1  •  fluorouracil (EFUDEX) 5 % cream, Apply to the left forehead and left cheek area -apply up to 2 weeks or until red, crusty, and raw, Disp: 40 g, Rfl: 2  •  fluticasone (FLONASE) 50 MCG/ACT  nasal spray, Use 2 sprays in the nostrils daily, Disp: 48 g, Rfl: 0  •  mesalamine (PENTASA) 500 MG CR capsule, Take 2 capsules by mouth 4 (Four) Times a Day., Disp: 720 capsule, Rfl: 3    No Known Allergies    Social History     Socioeconomic History   • Marital status:      Spouse name: Not on file   • Number of children: Not on file   • Years of education: Not on file   • Highest education level: Not on file   Tobacco Use   • Smoking status: Never Smoker   • Smokeless tobacco: Never Used   Substance and Sexual Activity   • Alcohol use: Yes     Alcohol/week: 14.0 standard drinks     Types: 14 Glasses of wine per week     Comment: daily   • Drug use: No   • Sexual activity: Defer       Family History   Problem Relation Age of Onset   • No Known Problems Father    • No Known Problems Mother    • Colon cancer Maternal Aunt    • Colon polyps Paternal Uncle    • Heart disease Maternal Grandfather    • Heart disease Paternal Grandfather        Review of Systems  General no fever chills or sweats weight stable  Gastrointestinal: Not present-abdominal pain, constipation, diarrhea, dysphagia, hematemesis, melena, odynophagia, nausea, vomiting, pyrosis, regurgitation, hematochezia,    Objective     Vitals:    09/21/20 1503   BP: 144/90   Pulse: 81   Temp: 97.3 °F (36.3 °C)   SpO2: 98%       Physical Exam  No acute distress. Vital signs as documented. Skin warm and dry and without overt rashes. EOMI, sclera anicteric.  Neck without JVD or masses. Lungs clear to auscultation bilaterally, no rales. Heart exam notable for regular rhythm, normal sounds and absence of loud murmurs, rubs or gallops. Abdomen is soft, nontender, non distended, normal bowel sounds and without evidence of organomegaly, masses, or abdominal aortic enlargement. Extremities nonedematous, no cyanosis. Neuro alert, moves extremities.        Assessment/Plan   Problem List Items Addressed This Visit        Digestive    Crohn's disease of small  intestine without complication (CMS/HCC) - Primary    Overview     Diagnosed in 2016.  Symptoms manifest by intermittent intestinal obstruction.  Prometheus positive for IBD.  Colonoscopy December 2019 revealed 1 healing ulcer in the terminal ileum 20 cm from the IC valve            Other    Encounter for monitoring immunomodulating therapy    Overview     TPMT enzyme levels are intermediate which can possibly increase risk of toxicity with Imuran at high doses.    T Spot negative June 2017, 2/2019, 5/2020  Test x-ray ordered August 2018  Patient has received the Pneumovax, as well as the shingles vaccine.  He did receive the flu shot for the 2017/18 season.  He will need repeated next season  He sees dermatology, Dr. Taylor, routinely and was advised to continue and why.  Hepatitis A and B vaccinations ordered August 2018, as of 5/9/19 has had a/b vaccinations and is due for 6 month booster in 3 mos.              I did review his labs that he had last month.    Clinically stable.  We had a long discussion again about the option of step up therapy to Humira.  We talked about how we did use Humira along with the Imuran and then once he was on the Humira and stable we would wean him off the Imuran.  We talked about the side effects of dual therapy with immunosuppression.  Talked about the pluses of it.  Talked about side effect of steroid use again.    At this time he states he is in a happy place.  He does not want to change his therapy.  He is also thinking that he does not want to be on dual immunosuppression therapy during a coronavirus pandemic.  I think is reasonable since he is relatively asymptomatic.  I did caution that if he continues to need steroids in the future then we need to reevaluate switching over to a biologic as an option.    We did talk about coronavirus and immunosuppression therapy.  At this time, we do know that immunosuppression therapy can increase risk of infection. But we do not know how  immunosuppression therapy absolutely affects one's ability to handle Covid 19.  It is not clear if the coronoavirus infection outcome may be worse on immunosupression therapy, but 1 may assume it may be .  Data does suggest that those who are medically compromised (such as with active IBD) are at increased risk for more severe consequences with coronavirus. We do know from past experience that individuals with active IBD tend to be weaker, more mal-nurished, and in a more frail condition that puts them at increased risk for poorer outcomes if they develop other illnesses.  Thus, theoretically it is possible one is at more at risk for a bad outcome if they develop coronavirus if they are off immunosuppressive therapy and develop active inflammatory bowel disease.      At this time, the odds of developing detrimental active inflammatory bowel disease off immunosuppression therapy are probably greater than the odds of contacting the coronavirus.  Although, things can rapidly change in the current environment.  Currently, the GI Societies are recommentding that patients stay on their immunosuppressive therapy to avoid becoming sick with active IBD and thus more vulnerable for a bad outcome. Therefore, I think it is reasonable to continue on immunosuppression therapy.      By continuing the therapy, the patient is living healthier trying to avoid the coronavirus versus living ill trying to avoid the coronavirus.    Final decision is up to the patient on which path they want to pursue.    I did go over good sanitary practices and how this is of utmost importance.      I will see him back in the office in January approximately.  Sooner if he has troubles.  He will be due for labs in November around Thanksgiving I told him.  We will plan to order them then     Continue ongoing management by primary care provider and other specialists.     Patient's Body mass index is 25.23 kg/m². BMI is within normal parameters. No  follow-up required..        EMR Dragon/transcription disclaimer:  Much of this encounter note is electronic transcription/translation of spoken language to printed text.  The electronic translation of spoken language may be erroneous, or at times, nonsensical words or phrases may be inadvertently transcribed.  Although I have reviewed the note for such errors, some may still exist.    Hector Mooney MD  17:03 CDT  09/21/20

## 2020-11-18 DIAGNOSIS — K50.00 CROHN'S DISEASE OF SMALL INTESTINE WITHOUT COMPLICATION (HCC): Primary | ICD-10-CM

## 2020-12-16 ENCOUNTER — LAB (OUTPATIENT)
Dept: LAB | Facility: HOSPITAL | Age: 70
End: 2020-12-16

## 2020-12-16 DIAGNOSIS — K50.00 CROHN'S DISEASE OF SMALL INTESTINE WITHOUT COMPLICATION (HCC): ICD-10-CM

## 2020-12-16 LAB
ALBUMIN SERPL-MCNC: 4.3 G/DL (ref 3.5–5.2)
ALBUMIN/GLOB SERPL: 1.7 G/DL
ALP SERPL-CCNC: 114 U/L (ref 39–117)
ALT SERPL W P-5'-P-CCNC: 13 U/L (ref 1–41)
ANION GAP SERPL CALCULATED.3IONS-SCNC: 4 MMOL/L (ref 5–15)
AST SERPL-CCNC: 15 U/L (ref 1–40)
BASOPHILS # BLD AUTO: 0.03 10*3/MM3 (ref 0–0.2)
BASOPHILS NFR BLD AUTO: 0.7 % (ref 0–1.5)
BILIRUB SERPL-MCNC: 0.5 MG/DL (ref 0–1.2)
BUN SERPL-MCNC: 13 MG/DL (ref 8–23)
BUN/CREAT SERPL: 13 (ref 7–25)
CALCIUM SPEC-SCNC: 9.3 MG/DL (ref 8.6–10.5)
CHLORIDE SERPL-SCNC: 103 MMOL/L (ref 98–107)
CO2 SERPL-SCNC: 33 MMOL/L (ref 22–29)
CREAT SERPL-MCNC: 1 MG/DL (ref 0.76–1.27)
DEPRECATED RDW RBC AUTO: 41.3 FL (ref 37–54)
EOSINOPHIL # BLD AUTO: 0.08 10*3/MM3 (ref 0–0.4)
EOSINOPHIL NFR BLD AUTO: 1.7 % (ref 0.3–6.2)
ERYTHROCYTE [DISTWIDTH] IN BLOOD BY AUTOMATED COUNT: 12.3 % (ref 12.3–15.4)
GFR SERPL CREATININE-BSD FRML MDRD: 74 ML/MIN/1.73
GLOBULIN UR ELPH-MCNC: 2.5 GM/DL
GLUCOSE SERPL-MCNC: 82 MG/DL (ref 65–99)
HCT VFR BLD AUTO: 45.6 % (ref 37.5–51)
HGB BLD-MCNC: 16 G/DL (ref 13–17.7)
IMM GRANULOCYTES # BLD AUTO: 0.03 10*3/MM3 (ref 0–0.05)
IMM GRANULOCYTES NFR BLD AUTO: 0.7 % (ref 0–0.5)
LYMPHOCYTES # BLD AUTO: 0.63 10*3/MM3 (ref 0.7–3.1)
LYMPHOCYTES NFR BLD AUTO: 13.7 % (ref 19.6–45.3)
MCH RBC QN AUTO: 32.3 PG (ref 26.6–33)
MCHC RBC AUTO-ENTMCNC: 35.1 G/DL (ref 31.5–35.7)
MCV RBC AUTO: 91.9 FL (ref 79–97)
MONOCYTES # BLD AUTO: 0.33 10*3/MM3 (ref 0.1–0.9)
MONOCYTES NFR BLD AUTO: 7.2 % (ref 5–12)
NEUTROPHILS NFR BLD AUTO: 3.51 10*3/MM3 (ref 1.7–7)
NEUTROPHILS NFR BLD AUTO: 76 % (ref 42.7–76)
NRBC BLD AUTO-RTO: 0 /100 WBC (ref 0–0.2)
PLATELET # BLD AUTO: 191 10*3/MM3 (ref 140–450)
PMV BLD AUTO: 9.9 FL (ref 6–12)
POTASSIUM SERPL-SCNC: 3.9 MMOL/L (ref 3.5–5.2)
PROT SERPL-MCNC: 6.8 G/DL (ref 6–8.5)
RBC # BLD AUTO: 4.96 10*6/MM3 (ref 4.14–5.8)
SODIUM SERPL-SCNC: 140 MMOL/L (ref 136–145)
WBC # BLD AUTO: 4.61 10*3/MM3 (ref 3.4–10.8)

## 2020-12-16 PROCEDURE — 36415 COLL VENOUS BLD VENIPUNCTURE: CPT

## 2020-12-16 PROCEDURE — 80053 COMPREHEN METABOLIC PANEL: CPT

## 2020-12-16 PROCEDURE — 85025 COMPLETE CBC W/AUTO DIFF WBC: CPT

## 2021-01-15 ENCOUNTER — IMMUNIZATION (OUTPATIENT)
Dept: VACCINE CLINIC | Facility: HOSPITAL | Age: 71
End: 2021-01-15

## 2021-01-15 PROCEDURE — 0011A: CPT | Performed by: OBSTETRICS & GYNECOLOGY

## 2021-01-15 PROCEDURE — 91301 HC SARSCO02 VAC 100MCG/0.5ML IM: CPT | Performed by: OBSTETRICS & GYNECOLOGY

## 2021-01-15 PROCEDURE — 0012A: CPT | Performed by: OBSTETRICS & GYNECOLOGY

## 2021-01-18 ENCOUNTER — OFFICE VISIT (OUTPATIENT)
Dept: GASTROENTEROLOGY | Facility: CLINIC | Age: 71
End: 2021-01-18

## 2021-01-18 VITALS
HEIGHT: 72 IN | SYSTOLIC BLOOD PRESSURE: 138 MMHG | WEIGHT: 182 LBS | BODY MASS INDEX: 24.65 KG/M2 | OXYGEN SATURATION: 98 % | HEART RATE: 86 BPM | DIASTOLIC BLOOD PRESSURE: 86 MMHG | TEMPERATURE: 96.9 F

## 2021-01-18 DIAGNOSIS — K50.00 CROHN'S DISEASE OF SMALL INTESTINE WITHOUT COMPLICATION (HCC): Primary | ICD-10-CM

## 2021-01-18 DIAGNOSIS — Z51.81 ENCOUNTER FOR MONITORING IMMUNOMODULATING THERAPY: ICD-10-CM

## 2021-01-18 DIAGNOSIS — Z79.899 ENCOUNTER FOR MONITORING IMMUNOMODULATING THERAPY: ICD-10-CM

## 2021-01-18 PROCEDURE — 99214 OFFICE O/P EST MOD 30 MIN: CPT | Performed by: INTERNAL MEDICINE

## 2021-01-19 NOTE — PROGRESS NOTES
[facility]    Chief Complaint   Patient presents with   • Crohn's Disease       Subjective     HPI    Vinicio Ring is a 70 y.o. male who presents with a chief complaint of Crohn's.    He comes in for 4-month checkup.  He states he is doing well.  States he may have had 2 episodes of minor discomfort in his abdomen since then.  He has not had take any steroids for a year.  His weights been stable.  He has good appetite.  Denies any diarrhea.  He has formed stools.  No fever chills sweats.  No arthralgias.  Everything is going well with him.    When he was here in September we had discussed the option of step up therapy with adding Humira to his Imuran and then after several months of dual therapy with drawl the Imuran.  We talked about the pros and cons of that then and we discussed this again today.    ================== September 21, 2020 HPI===================================  HPI     Vinicio Ring is a 69 y.o. male who presents with a chief complaint of Crohn's     When he was here in May was having improvement after tapering off Entocort.  I talked to him about going to every other day with the Entocort for 14 days then off.  We have talked about step up therapy.  We talked about the option of Humira.  He wished to see how things went.  He presents now for follow-up evaluation.     He tells me he is doing well.  He is not had use any steroids since he being on Entocort.  He finished Entocort in May.  He states he may get a little twinge of abdominal discomfort but nothing that he can handle.  Nothing that he has to take any medications for.  Occasionally has a little bit of right flank pain.  Denies any blood in his urine.  Denies diarrhea or blood in stools.  Has had no fever chills sweats.  He is gained 1 or 2 pounds.  He overall feels well.     During this coronavirus, he has been self isolating.  He still working but he is able to do most of his work from home.  He is wearing  a mask when he is out.     =================== May 11, 2020 HPI===================================================  HPI     Vinicio Ring is a 69 y.o. male who presents with a chief complaint of Crohn's     When he was here in February we discussed the options of step up therapy to Humira and Imuran.  We discussed other options.  He chose not to pursue a biologic but to add Entocort to the Imuran.  See a copy of the assessment plan from that visit below.     He tells me he did well with the Entocort.  He had one episode of abdominal discomfort that was mild and lasted about 3 hours.  This started a couple days after he decreased the dose from 2 tablets daily 1 tablet daily.  That is the only time he had any symptoms.  Otherwise he has been doing well.  He feels like he is gained a little bit of weight and is up 1 pound from February.  He is up about 4 pounds since December.     He denies any fever chills or sweats.  He has had no diarrhea.  He has had no nausea vomiting or than one little episode.  He has 11 days ago on the once a day Entocort.  He tells me he still working.  He is able to do most of his work from home at the [facility].  He has been self isolating for the most part.        ============================== February 13, 2020 assessment plan ========================================        We had a long discussion.  He still had one ulcer on colonoscopy exam.  He has some symptoms which could be from active disease higher up in the small bowel that we cannot identify or possibly even fibrotic tissue.  He does respond to Medrol Dosepak which makes me think it is active disease.  I told him I do not favor treating periodically with a Medrol Dosepak I think once in a blue moon it may be reasonable but not routinely.  I therefore favor changing therapy.  I discussed options.  One we could continue on the course we are doing which I do not strongly favor.  We could transition from azathioprine to a  biologic such as Humira.  The third option discussed was continued azathioprine but add a 3-month tapering course of Entocort.     We had a long discussion about the pros and cons of each option.  We talked about side effects of the medications and benefits of the medicines.  We talked about efficacy of the drugs.  I went into detail with him.  We have had these conversations in the past.  He informed me he still not ready to pursue the Biologics.  He does not want commit to that route.  He wishes to pursue option 3 with continue azathioprine but a tapering course of Entocort.  We did discuss the side effects of Entocort with it being a steroid but a high first-pass metabolism and what that means for side effects.     I will see him back in the office in 3 months.  He is going take Entocort 9 mg daily for 1 month then 6 mg daily for 1 month then 3 mg daily.  We talked about stopping Pentasa.  He wishes to stop the Pentasa and he will do so when this prescription runs out.  I did refill his prescription for Medrol Dosepak to have on standby as this has helped keep him out of the emergency room in the past.  Past Medical History:   Diagnosis Date   • Antral cyst 12/14/2017   • Arthritis    • Chronic rhinitis 12/14/2017   • Elevated PSA    • GERD (gastroesophageal reflux disease)    • Rosacea, unspecified        Past Surgical History:   Procedure Laterality Date   • CAPSULE ENDOSCOPY N/A 10/19/2016    Procedure: CAPSULE ENDOSCOPY AGILE;  Surgeon: Hector Mooney MD;  Location: [facility] ENDOSCOPY;  Service:    • CHOLECYSTECTOMY     • CHOLECYSTECTOMY     • COLONOSCOPY N/A 10/19/2016    Procedure: COLONOSCOPY WITH ANESTHESIA;  Surgeon: Hector Mooney MD;  Location: [facility] ENDOSCOPY;  Service:    • COLONOSCOPY N/A 12/17/2019    Procedure: COLONOSCOPY;  Surgeon: Hector Mooney MD;  Location: [facility] ENDOSCOPY;  Service: Gastroenterology   • ENDOSCOPY N/A 10/19/2016    Procedure: ESOPHAGOGASTRODUODENOSCOPY WITH  ANESTHESIA;  Surgeon: Hector Mooney MD;  Location: [facility] ENDOSCOPY;  Service:          Current Outpatient Medications:   •  azaTHIOprine (IMURAN) 50 MG tablet, Take 1 tablet by mouth Daily., Disp: 30 tablet, Rfl: 11  •  Famotidine (PEPCID AC PO), Take  by mouth Every Night., Disp: , Rfl:   •  ondansetron (ZOFRAN) 8 MG tablet, Take 1 tablet by mouth Every 8 (Eight) Hours As Needed for Nausea or Vomiting., Disp: 10 tablet, Rfl: 1  •  fluorouracil (EFUDEX) 5 % cream, Apply to the left forehead and left cheek area -apply up to 2 weeks or until red, crusty, and raw, Disp: 40 g, Rfl: 2  •  fluticasone (FLONASE) 50 MCG/ACT nasal spray, Use 2 sprays in each nostril Daily., Disp: 16 g, Rfl: 11  •  fluticasone (FLONASE) 50 MCG/ACT nasal spray, Use 2 sprays in the nostrils daily, Disp: 48 g, Rfl: 0  •  mesalamine (PENTASA) 500 MG CR capsule, Take 2 capsules by mouth 4 (Four) Times a Day., Disp: 720 capsule, Rfl: 3    No Known Allergies    Social History     Socioeconomic History   • Marital status:      Spouse name: Not on file   • Number of children: Not on file   • Years of education: Not on file   • Highest education level: Not on file   Tobacco Use   • Smoking status: Never Smoker   • Smokeless tobacco: Never Used   Substance and Sexual Activity   • Alcohol use: Yes     Alcohol/week: 14.0 standard drinks     Types: 14 Glasses of wine per week     Comment: daily   • Drug use: No   • Sexual activity: Defer       Family History   Problem Relation Age of Onset   • No Known Problems Father    • No Known Problems Mother    • Colon cancer Maternal Aunt    • Colon polyps Paternal Uncle    • Heart disease Maternal Grandfather    • Heart disease Paternal Grandfather        Review of Systems  General no fever chills or sweats weight stable  Gastrointestinal: Not present-abdominal pain, constipation, diarrhea, dysphagia, hematemesis, melena, odynophagia, nausea, vomiting, pyrosis, regurgitation, hematochezia,    Objective      Vitals:    01/18/21 1521   BP: 138/86   Pulse: 86   Temp: 96.9 °F (36.1 °C)   SpO2: 98%       Physical Exam  No acute distress. Vital signs as documented.  Sclera anicteric.  Neck without noticeable JVD. Lungs clear to auscultation. Heart exam notable for regular rhythm, normal sounds. Abdomen is soft, nontender, non distended, normal bowel sounds and without evidence of organomegaly, masses.  Neuro alert, moves extremities.        Assessment/Plan   Problem List Items Addressed This Visit        Other    Crohn's disease of small intestine without complication (CMS/HCC) - Primary    Overview     Diagnosed in 2016.  Symptoms manifest by intermittent intestinal obstruction.  Prometheus positive for IBD.  Colonoscopy December 2019 revealed 1 healing ulcer in the terminal ileum 20 cm from the IC valve         Encounter for monitoring immunomodulating therapy    Overview     TPMT enzyme levels are intermediate which can possibly increase risk of toxicity with Imuran at high doses.    T Spot negative June 2017, 2/2019, 5/2020  Test x-ray ordered August 2018  Patient has received the Pneumovax, as well as the shingles vaccine.  He did receive the flu shot for the 2017/18 season.  He will need repeated next season  He sees dermatology, Dr. Taylor, routinely and was advised to continue and why.  Hepatitis A and B vaccinations ordered August 2018, as of 5/9/19 has had a/b vaccinations and is due for 6 month booster in 3 mos.                  Currently he remains relatively asymptomatic.  We talked about Crohn's disease at length again.  We talked about the option of step up therapy versus staying where he is.  We talked about Humira versus Imuran therapy.  We talked about dual therapy.  Questions were answered.  At this time he is relatively asymptomatic.  He does not want to pursue dual therapy and then transition to Humira and I think that is reasonable.  I think is reasonable to continue with the Imuran therapy since he  is asymptomatic.  If he develops any symptoms in the future then we can add Humira or substitute.  If he develops any symptoms he is going to contact me.  We will go and see him back in office in 6 months.  He will need to continue to get routine labs checked and I reviewed his latest labs with him.    Of note, he did get the coronavirus vaccination, the first dose.  He continues to wear his mask and social distance.  He states he is going on the ultimate social distance vacation.  He states he and his wife in April going to rent an RV and the 2 of them are going camping together.  He hopes to go down to a beach in [place]    Continue ongoing management by primary care provider and other specialists.     Patient's Body mass index is 24.68 kg/m². BMI is within normal parameters. No follow-up required..        EMR Dragon/transcription disclaimer:  Much of this encounter note is electronic transcription/translation of spoken language to printed text.  The electronic translation of spoken language may be erroneous, or at times, nonsensical words or phrases may be inadvertently transcribed.  Although I have reviewed the note for such errors, some may still exist.    Hector Mooney MD  18:03 CST  01/18/21

## 2021-02-12 ENCOUNTER — IMMUNIZATION (OUTPATIENT)
Dept: VACCINE CLINIC | Facility: HOSPITAL | Age: 71
End: 2021-02-12

## 2021-02-12 PROCEDURE — 0012A: CPT | Performed by: OBSTETRICS & GYNECOLOGY

## 2021-02-12 PROCEDURE — 91301 HC SARSCO02 VAC 100MCG/0.5ML IM: CPT | Performed by: OBSTETRICS & GYNECOLOGY

## 2021-03-11 DIAGNOSIS — K50.00 CROHN'S DISEASE OF SMALL INTESTINE WITHOUT COMPLICATION (HCC): Primary | ICD-10-CM

## 2021-05-13 RX ORDER — AZATHIOPRINE 50 MG/1
50 TABLET ORAL DAILY
Qty: 30 TABLET | Refills: 7 | Status: SHIPPED | OUTPATIENT
Start: 2021-05-13 | End: 2021-07-19 | Stop reason: SDUPTHER

## 2021-06-16 ENCOUNTER — LAB (OUTPATIENT)
Dept: LAB | Facility: HOSPITAL | Age: 71
End: 2021-06-16

## 2021-06-16 DIAGNOSIS — K50.00 CROHN'S DISEASE OF SMALL INTESTINE WITHOUT COMPLICATION (HCC): ICD-10-CM

## 2021-06-16 LAB
ALBUMIN SERPL-MCNC: 4.2 G/DL (ref 3.5–5.2)
ALBUMIN/GLOB SERPL: 1.8 G/DL
ALP SERPL-CCNC: 118 U/L (ref 39–117)
ALT SERPL W P-5'-P-CCNC: 12 U/L (ref 1–41)
ANION GAP SERPL CALCULATED.3IONS-SCNC: 7 MMOL/L (ref 5–15)
AST SERPL-CCNC: 16 U/L (ref 1–40)
BASOPHILS # BLD AUTO: 0.04 10*3/MM3 (ref 0–0.2)
BASOPHILS NFR BLD AUTO: 0.7 % (ref 0–1.5)
BILIRUB SERPL-MCNC: 0.4 MG/DL (ref 0–1.2)
BUN SERPL-MCNC: 14 MG/DL (ref 8–23)
BUN/CREAT SERPL: 13.5 (ref 7–25)
CALCIUM SPEC-SCNC: 9.3 MG/DL (ref 8.6–10.5)
CHLORIDE SERPL-SCNC: 106 MMOL/L (ref 98–107)
CO2 SERPL-SCNC: 30 MMOL/L (ref 22–29)
CREAT SERPL-MCNC: 1.04 MG/DL (ref 0.76–1.27)
DEPRECATED RDW RBC AUTO: 40.4 FL (ref 37–54)
EOSINOPHIL # BLD AUTO: 0.08 10*3/MM3 (ref 0–0.4)
EOSINOPHIL NFR BLD AUTO: 1.4 % (ref 0.3–6.2)
ERYTHROCYTE [DISTWIDTH] IN BLOOD BY AUTOMATED COUNT: 12.1 % (ref 12.3–15.4)
GFR SERPL CREATININE-BSD FRML MDRD: 71 ML/MIN/1.73
GLOBULIN UR ELPH-MCNC: 2.4 GM/DL
GLUCOSE SERPL-MCNC: 105 MG/DL (ref 65–99)
HCT VFR BLD AUTO: 45.5 % (ref 37.5–51)
HGB BLD-MCNC: 16.1 G/DL (ref 13–17.7)
IMM GRANULOCYTES # BLD AUTO: 0.04 10*3/MM3 (ref 0–0.05)
IMM GRANULOCYTES NFR BLD AUTO: 0.7 % (ref 0–0.5)
LYMPHOCYTES # BLD AUTO: 0.91 10*3/MM3 (ref 0.7–3.1)
LYMPHOCYTES NFR BLD AUTO: 16.1 % (ref 19.6–45.3)
MCH RBC QN AUTO: 32.3 PG (ref 26.6–33)
MCHC RBC AUTO-ENTMCNC: 35.4 G/DL (ref 31.5–35.7)
MCV RBC AUTO: 91.2 FL (ref 79–97)
MONOCYTES # BLD AUTO: 0.42 10*3/MM3 (ref 0.1–0.9)
MONOCYTES NFR BLD AUTO: 7.4 % (ref 5–12)
NEUTROPHILS NFR BLD AUTO: 4.16 10*3/MM3 (ref 1.7–7)
NEUTROPHILS NFR BLD AUTO: 73.7 % (ref 42.7–76)
NRBC BLD AUTO-RTO: 0 /100 WBC (ref 0–0.2)
PLATELET # BLD AUTO: 178 10*3/MM3 (ref 140–450)
PMV BLD AUTO: 9.8 FL (ref 6–12)
POTASSIUM SERPL-SCNC: 4.2 MMOL/L (ref 3.5–5.2)
PROT SERPL-MCNC: 6.6 G/DL (ref 6–8.5)
RBC # BLD AUTO: 4.99 10*6/MM3 (ref 4.14–5.8)
SODIUM SERPL-SCNC: 143 MMOL/L (ref 136–145)
WBC # BLD AUTO: 5.65 10*3/MM3 (ref 3.4–10.8)

## 2021-06-16 PROCEDURE — 36415 COLL VENOUS BLD VENIPUNCTURE: CPT

## 2021-06-16 PROCEDURE — 85025 COMPLETE CBC W/AUTO DIFF WBC: CPT

## 2021-06-16 PROCEDURE — 80053 COMPREHEN METABOLIC PANEL: CPT

## 2021-07-19 ENCOUNTER — OFFICE VISIT (OUTPATIENT)
Dept: GASTROENTEROLOGY | Facility: CLINIC | Age: 71
End: 2021-07-19

## 2021-07-19 VITALS
TEMPERATURE: 97.8 F | BODY MASS INDEX: 23.84 KG/M2 | DIASTOLIC BLOOD PRESSURE: 78 MMHG | WEIGHT: 176 LBS | SYSTOLIC BLOOD PRESSURE: 122 MMHG | HEART RATE: 80 BPM | OXYGEN SATURATION: 98 % | HEIGHT: 72 IN

## 2021-07-19 DIAGNOSIS — Z51.81 ENCOUNTER FOR MONITORING IMMUNOMODULATING THERAPY: ICD-10-CM

## 2021-07-19 DIAGNOSIS — Z79.899 ENCOUNTER FOR MONITORING IMMUNOMODULATING THERAPY: ICD-10-CM

## 2021-07-19 DIAGNOSIS — K50.00 CROHN'S DISEASE OF SMALL INTESTINE WITHOUT COMPLICATION (HCC): Primary | ICD-10-CM

## 2021-07-19 PROCEDURE — 99213 OFFICE O/P EST LOW 20 MIN: CPT | Performed by: INTERNAL MEDICINE

## 2021-07-19 RX ORDER — AZATHIOPRINE 50 MG/1
50 TABLET ORAL DAILY
Qty: 30 TABLET | Refills: 11 | Status: SHIPPED | OUTPATIENT
Start: 2021-07-19 | End: 2022-07-07 | Stop reason: SDUPTHER

## 2021-07-19 RX ORDER — ONDANSETRON HYDROCHLORIDE 8 MG/1
8 TABLET, FILM COATED ORAL EVERY 8 HOURS PRN
Qty: 10 TABLET | Refills: 1 | Status: SHIPPED | OUTPATIENT
Start: 2021-07-19

## 2021-07-19 NOTE — PROGRESS NOTES
"[facility] Gastroenterology    Chief Complaint   Patient presents with   • Follow-up     Pt presents today for Crohn's follow up; Pt states he is feeling good but needs a refill on his zofran \"just in case\" he needs it       Subjective     HPI    Vinicio Ring is a 70 y.o. male who presents with a chief complaint of Crohn's.    He tells me he is doing well.  He has had no flares in the 6 months since he was here in the office.  He states he and several other people came down with food poisoning month or 2 ago.  That was short-lived.  He has had no abdominal cramps, diarrhea or any of his prior GI symptoms.  He has not had to use a Medrol Dosepak in over a year.  He is happy with things stands.    When he was here in January talked about transitioning from Imuran therapy to Humira.  Since he was asymptomatic and doing well he chose to stay with Imuran therapy which was quite reasonable.  As a reminder, he last had a colonoscopy November 2019 noting 1 small ulcer in his terminal ileum.    ========== January 2021 HPI======================================  HPI     Vinicio Ring is a 70 y.o. male who presents with a chief complaint of Crohn's.     He comes in for 4-month checkup.  He states he is doing well.  States he may have had 2 episodes of minor discomfort in his abdomen since then.  He has not had take any steroids for a year.  His weights been stable.  He has good appetite.  Denies any diarrhea.  He has formed stools.  No fever chills sweats.  No arthralgias.  Everything is going well with him.     When he was here in September we had discussed the option of step up therapy with adding Humira to his Imuran and then after several months of dual therapy with drawl the Imuran.  We talked about the pros and cons of that then and we discussed this again today.     ================== September 21, 2020 HPI===================================  HPI     Vinicio Ring is a 69 y.o. male who presents with a " chief complaint of Crohn's     When he was here in May was having improvement after tapering off Entocort.  I talked to him about going to every other day with the Entocort for 14 days then off.  We have talked about step up therapy.  We talked about the option of Humira.  He wished to see how things went.  He presents now for follow-up evaluation.     He tells me he is doing well.  He is not had use any steroids since he being on Entocort.  He finished Entocort in May.  He states he may get a little twinge of abdominal discomfort but nothing that he can handle.  Nothing that he has to take any medications for.  Occasionally has a little bit of right flank pain.  Denies any blood in his urine.  Denies diarrhea or blood in stools.  Has had no fever chills sweats.  He is gained 1 or 2 pounds.  He overall feels well.     During this coronavirus, he has been self isolating.  He still working but he is able to do most of his work from home.  He is wearing a mask when he is out.     =================== May 11, 2020 HPI===================================================  HPI     Vinicio Ring is a 69 y.o. male who presents with a chief complaint of Crohn's     When he was here in February we discussed the options of step up therapy to Humira and Imuran.  We discussed other options.  He chose not to pursue a biologic but to add Entocort to the Imuran.  See a copy of the assessment plan from that visit below.     He tells me he did well with the Entocort.  He had one episode of abdominal discomfort that was mild and lasted about 3 hours.  This started a couple days after he decreased the dose from 2 tablets daily 1 tablet daily.  That is the only time he had any symptoms.  Otherwise he has been doing well.  He feels like he is gained a little bit of weight and is up 1 pound from February.  He is up about 4 pounds since December.     He denies any fever chills or sweats.  He has had no diarrhea.  He has had no nausea  vomiting or than one little episode.  He has 11 days ago on the once a day Entocort.  He tells me he still working.  He is able to do most of his work from [place] at the [place] [employer].  He has been self isolating for the most part.        ============================== February 13, 2020 assessment plan ========================================        We had a long discussion.  He still had one ulcer on colonoscopy exam.  He has some symptoms which could be from active disease higher up in the small bowel that we cannot identify or possibly even fibrotic tissue.  He does respond to Medrol Dosepak which makes me think it is active disease.  I told him I do not favor treating periodically with a Medrol Dosepak I think once in a blue moon it may be reasonable but not routinely.  I therefore favor changing therapy.  I discussed options.  One we could continue on the course we are doing which I do not strongly favor.  We could transition from azathioprine to a biologic such as Humira.  The third option discussed was continued azathioprine but add a 3-month tapering course of Entocort.     We had a long discussion about the pros and cons of each option.  We talked about side effects of the medications and benefits of the medicines.  We talked about efficacy of the drugs.  I went into detail with him.  We have had these conversations in the past.  He informed me he still not ready to pursue the Biologics.  He does not want commit to that route.  He wishes to pursue option 3 with continue azathioprine but a tapering course of Entocort.  We did discuss the side effects of Entocort with it being a steroid but a high first-pass metabolism and what that means for side effects.     I will see him back in the office in 3 months.  He is going take Entocort 9 mg daily for 1 month then 6 mg daily for 1 month then 3 mg daily.  We talked about stopping Pentasa.  He wishes to stop the Pentasa and he will do so when this prescription runs out.   I did refill his prescription for Medrol Dosepak to have on standby as this has helped keep him out of the emergency room in the past.    Past Medical History:   Diagnosis Date   • Antral cyst 12/14/2017   • Arthritis    • Chronic rhinitis 12/14/2017   • Elevated PSA    • GERD (gastroesophageal reflux disease)    • Rosacea, unspecified        Past Surgical History:   Procedure Laterality Date   • CAPSULE ENDOSCOPY N/A 10/19/2016    Procedure: CAPSULE ENDOSCOPY AGILE;  Surgeon: Hector Mooney MD;  Location: [facility] ENDOSCOPY;  Service:    • CHOLECYSTECTOMY     • CHOLECYSTECTOMY     • COLONOSCOPY N/A 10/19/2016    Procedure: COLONOSCOPY WITH ANESTHESIA;  Surgeon: Hector Mooney MD;  Location:  [facility] ENDOSCOPY;  Service:    • COLONOSCOPY N/A 12/17/2019    Procedure: COLONOSCOPY;  Surgeon: Hector Mooney MD;  Location: [facility] ENDOSCOPY;  Service: Gastroenterology   • ENDOSCOPY N/A 10/19/2016    Procedure: ESOPHAGOGASTRODUODENOSCOPY WITH ANESTHESIA;  Surgeon: Hector Mooney MD;  Location: [facility] ENDOSCOPY;  Service:          Current Outpatient Medications:   •  azaTHIOprine (IMURAN) 50 MG tablet, Take 1 tablet by mouth Daily., Disp: 30 tablet, Rfl: 11  •  famotidine (Pepcid AC) 10 MG tablet, Take 1 tablet by mouth Every Night., Disp: , Rfl:   •  ondansetron (ZOFRAN) 8 MG tablet, Take 1 tablet by mouth Every 8 (Eight) Hours As Needed for Nausea or Vomiting., Disp: 10 tablet, Rfl: 1    No Known Allergies    Social History     Socioeconomic History   • Marital status:      Spouse name: Not on file   • Number of children: Not on file   • Years of education: Not on file   • Highest education level: Not on file   Tobacco Use   • Smoking status: Never Smoker   • Smokeless tobacco: Never Used   Vaping Use   • Vaping Use: Never used   Substance and Sexual Activity   • Alcohol use: Yes     Alcohol/week: 14.0 standard drinks     Types: 14 Glasses of wine per week     Comment: Daily    • Drug use: No   • Sexual  activity: Defer       Family History   Problem Relation Age of Onset   • No Known Problems Father    • No Known Problems Mother    • Colon cancer Maternal Aunt    • Colon polyps Paternal Uncle    • Heart disease Maternal Grandfather    • Heart disease Paternal Grandfather        Review of Systems  No abdominal discomfort, no diarrhea, no bloating    Objective     Vitals:    07/19/21 1522   BP: 122/78   Pulse: 80   Temp: 97.8 °F (36.6 °C)   SpO2: 98%       Physical Exam  No acute distress. Vital signs as documented.  Sclera anicteric.  Neck without noticeable JVD. Lungs clear to auscultation. Heart exam notable for regular rhythm, normal sounds. Abdomen is soft, nontender, non distended, normal bowel sounds and without evidence of organomegaly, masses.  Neuro alert, moves extremities.        Assessment/Plan   Problem List Items Addressed This Visit        Gastrointestinal Abdominal     Crohn's disease of small intestine without complication (CMS/HCC) - Primary    Overview     Diagnosed in 2016.  Symptoms manifest by intermittent intestinal obstruction.  Prometheus positive for IBD.  Colonoscopy December 2019 revealed 1 healing ulcer in the terminal ileum 20 cm from the IC valve            Health Encounters    Encounter for monitoring immunomodulating therapy    Overview     TPMT enzyme levels are intermediate which can possibly increase risk of toxicity with Imuran at high doses.    T Spot negative June 2017, 2/2019, 5/2020  Test x-ray ordered August 2018  Patient has received the Pneumovax, as well as the shingles vaccine.  He did receive the flu shot for the 2017/18 season.  He will need repeated next season  He sees dermatology, Dr. Taylor, routinely and was advised to continue and why.  Hepatitis A and B vaccinations ordered August 2018, as of 5/9/19 has had a/b vaccinations and is due for 6 month booster in 3 mos.  Vaccinated against coronavirus January 2021                  Clinically stable.  I reviewed his CBC  and CMP with him that he had in June.  These were unremarkable.  Recommend we repeat CBC and CMP in approximately September.  Plan to continue immunosuppressant therapy with Imuran.  He agrees with this plan.  We will see him back in the office in 6 months.    Continue ongoing management by primary care provider and other specialists.     Body mass index is 23.87 kg/m².  Normal BMI no GI intervention recommended      EMR Dragon/transcription disclaimer:  Much of this encounter note is electronic transcription/translation of spoken language to printed text.  The electronic translation of spoken language may be erroneous, or at times, nonsensical words or phrases may be inadvertently transcribed.  Although I have reviewed the note for such errors, some may still exist.    Hector Mooney MD  16:21 CDT  07/19/21

## 2021-08-04 ENCOUNTER — TELEPHONE (OUTPATIENT)
Dept: GASTROENTEROLOGY | Facility: CLINIC | Age: 71
End: 2021-08-04

## 2021-08-04 NOTE — TELEPHONE ENCOUNTER
PT IS DUE FOR A REPEAT COLONOSCOPY. HE WAS HERE FOR AN OV ON 7/19/21. DOES HE NEED TO HAVE ANOTHER OV BEFORE YOU CAN SCHEDULE HIS SCOPE?

## 2021-09-02 ENCOUNTER — LAB (OUTPATIENT)
Dept: LAB | Facility: HOSPITAL | Age: 71
End: 2021-09-02

## 2021-09-02 ENCOUNTER — TRANSCRIBE ORDERS (OUTPATIENT)
Dept: ADMINISTRATIVE | Facility: HOSPITAL | Age: 71
End: 2021-09-02

## 2021-09-02 DIAGNOSIS — D51.0 VITAMIN B12 DEFICIENCY ANEMIA DUE TO INTRINSIC FACTOR DEFICIENCY: ICD-10-CM

## 2021-09-02 DIAGNOSIS — K50.90 CROHN'S DISEASE WITHOUT COMPLICATION, UNSPECIFIED GASTROINTESTINAL TRACT LOCATION (HCC): ICD-10-CM

## 2021-09-02 DIAGNOSIS — E29.1 TESTICULAR HYPOFUNCTION: Primary | ICD-10-CM

## 2021-09-02 DIAGNOSIS — E29.1 TESTICULAR HYPOFUNCTION: ICD-10-CM

## 2021-09-02 DIAGNOSIS — Z00.00 ROUTINE GENERAL MEDICAL EXAMINATION AT A HEALTH CARE FACILITY: ICD-10-CM

## 2021-09-02 DIAGNOSIS — Z00.00 ROUTINE GENERAL MEDICAL EXAMINATION AT A HEALTH CARE FACILITY: Primary | ICD-10-CM

## 2021-09-02 DIAGNOSIS — K21.9 GASTROESOPHAGEAL REFLUX DISEASE WITHOUT ESOPHAGITIS: ICD-10-CM

## 2021-09-02 DIAGNOSIS — Z12.5 ENCOUNTER FOR SCREENING FOR MALIGNANT NEOPLASM OF PROSTATE: ICD-10-CM

## 2021-09-02 LAB
ALBUMIN SERPL-MCNC: 4.4 G/DL (ref 3.5–5.2)
ALBUMIN/GLOB SERPL: 1.9 G/DL
ALP SERPL-CCNC: 101 U/L (ref 39–117)
ALT SERPL W P-5'-P-CCNC: 10 U/L (ref 1–41)
ANION GAP SERPL CALCULATED.3IONS-SCNC: 5.5 MMOL/L (ref 5–15)
AST SERPL-CCNC: 15 U/L (ref 1–40)
BACTERIA UR QL AUTO: ABNORMAL /HPF
BASOPHILS # BLD AUTO: 0.05 10*3/MM3 (ref 0–0.2)
BASOPHILS NFR BLD AUTO: 1.1 % (ref 0–1.5)
BILIRUB SERPL-MCNC: 0.6 MG/DL (ref 0–1.2)
BILIRUB UR QL STRIP: NEGATIVE
BUN SERPL-MCNC: 15 MG/DL (ref 8–23)
BUN/CREAT SERPL: 14 (ref 7–25)
CALCIUM SPEC-SCNC: 9 MG/DL (ref 8.6–10.5)
CHLORIDE SERPL-SCNC: 105 MMOL/L (ref 98–107)
CHOLEST SERPL-MCNC: 175 MG/DL (ref 0–200)
CLARITY UR: CLEAR
CO2 SERPL-SCNC: 26.5 MMOL/L (ref 22–29)
COLOR UR: YELLOW
CREAT SERPL-MCNC: 1.07 MG/DL (ref 0.76–1.27)
DEPRECATED RDW RBC AUTO: 45.6 FL (ref 37–54)
EOSINOPHIL # BLD AUTO: 0.07 10*3/MM3 (ref 0–0.4)
EOSINOPHIL NFR BLD AUTO: 1.5 % (ref 0.3–6.2)
ERYTHROCYTE [DISTWIDTH] IN BLOOD BY AUTOMATED COUNT: 12.9 % (ref 12.3–15.4)
GFR SERPL CREATININE-BSD FRML MDRD: 69 ML/MIN/1.73
GLOBULIN UR ELPH-MCNC: 2.3 GM/DL
GLUCOSE SERPL-MCNC: 100 MG/DL (ref 65–99)
GLUCOSE UR STRIP-MCNC: NEGATIVE MG/DL
HCT VFR BLD AUTO: 49.2 % (ref 37.5–51)
HDLC SERPL-MCNC: 53 MG/DL (ref 40–60)
HGB BLD-MCNC: 16.2 G/DL (ref 13–17.7)
HGB UR QL STRIP.AUTO: NEGATIVE
HYALINE CASTS UR QL AUTO: ABNORMAL /LPF
IMM GRANULOCYTES # BLD AUTO: 0.02 10*3/MM3 (ref 0–0.05)
IMM GRANULOCYTES NFR BLD AUTO: 0.4 % (ref 0–0.5)
KETONES UR QL STRIP: NEGATIVE
LDLC SERPL CALC-MCNC: 111 MG/DL (ref 0–100)
LDLC/HDLC SERPL: 2.08 {RATIO}
LEUKOCYTE ESTERASE UR QL STRIP.AUTO: ABNORMAL
LYMPHOCYTES # BLD AUTO: 0.68 10*3/MM3 (ref 0.7–3.1)
LYMPHOCYTES NFR BLD AUTO: 14.8 % (ref 19.6–45.3)
MCH RBC QN AUTO: 31.3 PG (ref 26.6–33)
MCHC RBC AUTO-ENTMCNC: 32.9 G/DL (ref 31.5–35.7)
MCV RBC AUTO: 95.2 FL (ref 79–97)
MONOCYTES # BLD AUTO: 0.36 10*3/MM3 (ref 0.1–0.9)
MONOCYTES NFR BLD AUTO: 7.8 % (ref 5–12)
NEUTROPHILS NFR BLD AUTO: 3.42 10*3/MM3 (ref 1.7–7)
NEUTROPHILS NFR BLD AUTO: 74.4 % (ref 42.7–76)
NITRITE UR QL STRIP: NEGATIVE
NRBC BLD AUTO-RTO: 0 /100 WBC (ref 0–0.2)
PH UR STRIP.AUTO: 7 [PH] (ref 5–8)
PLATELET # BLD AUTO: 199 10*3/MM3 (ref 140–450)
PMV BLD AUTO: 10.3 FL (ref 6–12)
POTASSIUM SERPL-SCNC: 4.4 MMOL/L (ref 3.5–5.2)
PROT SERPL-MCNC: 6.7 G/DL (ref 6–8.5)
PROT UR QL STRIP: NEGATIVE
PSA SERPL-MCNC: 4.7 NG/ML (ref 0–4)
RBC # BLD AUTO: 5.17 10*6/MM3 (ref 4.14–5.8)
RBC # UR: ABNORMAL /HPF
REF LAB TEST METHOD: ABNORMAL
SODIUM SERPL-SCNC: 137 MMOL/L (ref 136–145)
SP GR UR STRIP: 1.02 (ref 1–1.03)
SQUAMOUS #/AREA URNS HPF: ABNORMAL /HPF
TESTOST SERPL-MCNC: 433 NG/DL (ref 193–740)
TRIGL SERPL-MCNC: 58 MG/DL (ref 0–150)
UROBILINOGEN UR QL STRIP: ABNORMAL
VLDLC SERPL-MCNC: 11 MG/DL (ref 5–40)
WBC # BLD AUTO: 4.6 10*3/MM3 (ref 3.4–10.8)
WBC UR QL AUTO: ABNORMAL /HPF

## 2021-09-02 PROCEDURE — 80061 LIPID PANEL: CPT

## 2021-09-02 PROCEDURE — 80053 COMPREHEN METABOLIC PANEL: CPT

## 2021-09-02 PROCEDURE — G0103 PSA SCREENING: HCPCS

## 2021-09-02 PROCEDURE — 81001 URINALYSIS AUTO W/SCOPE: CPT

## 2021-09-02 PROCEDURE — 84403 ASSAY OF TOTAL TESTOSTERONE: CPT

## 2021-09-02 PROCEDURE — 85025 COMPLETE CBC W/AUTO DIFF WBC: CPT

## 2021-09-02 PROCEDURE — 36415 COLL VENOUS BLD VENIPUNCTURE: CPT

## 2021-10-05 NOTE — PROGRESS NOTES
Subjective    Mr. Ring is 71 y.o. male    Chief Complaint: Peyronies disease    History of Present Illness  71-year-old male new patient previously seen by Dr. Desir follow-up for erectile dysfunction and history of enlarged prostate.  He also has history of elevated PSA for which he underwent benign prostate biopsy April 2018.  He also has new problem of penile curvature.  He has not needed a PDE inhibitor in a while.  His most recent PSA on 9/2/2021 was mildly elevated at 4.7 but this is better than 5.3 last year.  I reviewed his CT abdomen pelvis with contrast enterography done for GI reasons on 2/18/2019 both independently and with the patient today showing normal kidneys bilaterally, no stone or hydronephrosis.  CT does show his known enlarged prostate.  He has had recent gross hematuria after a fall.    I independently visualized and reviewed the patient's prior imaging studies today in clinic and discussed the imaging findings with the patient.      The following portions of the patient's history were reviewed and updated as appropriate: allergies, current medications, past family history, past medical history, past social history, past surgical history and problem list.    Review of Systems      Current Outpatient Medications:   •  azaTHIOprine (IMURAN) 50 MG tablet, Take 1 tablet by mouth Daily., Disp: 30 tablet, Rfl: 11  •  famotidine (Pepcid AC) 10 MG tablet, Take 1 tablet by mouth Every Night., Disp: , Rfl:   •  ondansetron (ZOFRAN) 8 MG tablet, Take 1 tablet by mouth Every 8 (Eight) Hours As Needed for Nausea or Vomiting., Disp: 10 tablet, Rfl: 1    Past Medical History:   Diagnosis Date   • Antral cyst 12/14/2017   • Arthritis    • Chronic rhinitis 12/14/2017   • Elevated PSA    • GERD (gastroesophageal reflux disease)    • Rosacea, unspecified        Past Surgical History:   Procedure Laterality Date   • CAPSULE ENDOSCOPY N/A 10/19/2016    Procedure: CAPSULE ENDOSCOPY PEREZ;  Surgeon: Hector YUNG  "MD Vinciio;  Location: [facility] ENDOSCOPY;  Service:    • CHOLECYSTECTOMY     • CHOLECYSTECTOMY     • COLONOSCOPY N/A 10/19/2016    Procedure: COLONOSCOPY WITH ANESTHESIA;  Surgeon: Hector Mooney MD;  Location: [facility] ENDOSCOPY;  Service:    • COLONOSCOPY N/A 12/17/2019    Procedure: COLONOSCOPY;  Surgeon: Hector Mooney MD;  Location: [facility] ENDOSCOPY;  Service: Gastroenterology   • ENDOSCOPY N/A 10/19/2016    Procedure: ESOPHAGOGASTRODUODENOSCOPY WITH ANESTHESIA;  Surgeon: Hector Mooney MD;  Location: [facility] ENDOSCOPY;  Service:        Social History     Socioeconomic History   • Marital status:      Spouse name: Not on file   • Number of children: Not on file   • Years of education: Not on file   • Highest education level: Not on file   Tobacco Use   • Smoking status: Never Smoker   • Smokeless tobacco: Never Used   Vaping Use   • Vaping Use: Never used   Substance and Sexual Activity   • Alcohol use: Yes     Alcohol/week: 14.0 standard drinks     Types: 14 Glasses of wine per week     Comment: Daily    • Drug use: No   • Sexual activity: Defer       Family History   Problem Relation Age of Onset   • No Known Problems Father    • No Known Problems Mother    • Colon cancer Maternal Aunt    • Colon polyps Paternal Uncle    • Heart disease Maternal Grandfather    • Heart disease Paternal Grandfather        Objective    Temp 97.9 °F (36.6 °C)   Ht 182.9 cm (72\")   Wt 80.7 kg (178 lb)   BMI 24.14 kg/m²     Physical Exam  Penis and testicles are normal.  No masses.      Results for orders placed or performed in visit on 10/06/21   POC Urinalysis Dipstick, Multipro    Specimen: Urine   Result Value Ref Range    Color Yellow Yellow, Straw, Dark Yellow, Lyla    Clarity, UA Clear Clear    Glucose, UA Negative Negative, 1000 mg/dL (3+) mg/dL    Bilirubin Negative Negative    Ketones, UA Negative Negative    Specific Gravity  1.020 1.005 - 1.030    Blood, UA Trace (A) Negative    pH, Urine 6.5 5.0 - 8.0 "    Protein, POC Negative Negative mg/dL    Urobilinogen, UA Normal Normal    Nitrite, UA Negative Negative    Leukocytes Negative Negative     Assessment and Plan    Diagnoses and all orders for this visit:    1. Peyronie's disease (Primary)  -     POC Urinalysis Dipstick, Multipro    2. Benign prostatic hyperplasia without lower urinary tract symptoms    3. Gross hematuria    4. Elevated prostate specific antigen (PSA)      We had a long discussion regarding the natural history of Armand's disease along with its association with ED and loss of penile length and girth.  He would like to observe this for now.  He may take PDE inhibitors as needed.  For his blood in the urine, I recommended he consider a cystoscopy at some point to rule out bladder cancer.  He does have a history of enlarged prostate and does not want cystoscopy done at this time.  He declined a BACILIO today.  His elevated PSA is improved from last year.  We discussed treatment options for his Peyronie's including plication, referral for Xiaflex injections, or penile implant.  He will follow-up with me in 1 year or sooner as needed.      This document has been signed by FRANCIA Jewell MD on October 7, 2021 21:33 CDT

## 2021-10-06 ENCOUNTER — OFFICE VISIT (OUTPATIENT)
Dept: UROLOGY | Facility: CLINIC | Age: 71
End: 2021-10-06

## 2021-10-06 VITALS — BODY MASS INDEX: 24.11 KG/M2 | WEIGHT: 178 LBS | TEMPERATURE: 97.9 F | HEIGHT: 72 IN

## 2021-10-06 DIAGNOSIS — R31.0 GROSS HEMATURIA: ICD-10-CM

## 2021-10-06 DIAGNOSIS — N48.6 PEYRONIE'S DISEASE: Primary | ICD-10-CM

## 2021-10-06 DIAGNOSIS — N40.0 BENIGN PROSTATIC HYPERPLASIA WITHOUT LOWER URINARY TRACT SYMPTOMS: ICD-10-CM

## 2021-10-06 DIAGNOSIS — R97.20 ELEVATED PROSTATE SPECIFIC ANTIGEN (PSA): ICD-10-CM

## 2021-10-06 LAB
BILIRUB BLD-MCNC: NEGATIVE MG/DL
CLARITY, POC: CLEAR
COLOR UR: YELLOW
GLUCOSE UR STRIP-MCNC: NEGATIVE MG/DL
KETONES UR QL: NEGATIVE
LEUKOCYTE EST, POC: NEGATIVE
NITRITE UR-MCNC: NEGATIVE MG/ML
PH UR: 6.5 [PH] (ref 5–8)
PROT UR STRIP-MCNC: NEGATIVE MG/DL
RBC # UR STRIP: ABNORMAL /UL
SP GR UR: 1.02 (ref 1–1.03)
UROBILINOGEN UR QL: NORMAL

## 2021-10-06 PROCEDURE — 99204 OFFICE O/P NEW MOD 45 MIN: CPT | Performed by: UROLOGY

## 2021-10-06 PROCEDURE — 81003 URINALYSIS AUTO W/O SCOPE: CPT | Performed by: UROLOGY

## 2021-12-14 ENCOUNTER — TELEPHONE (OUTPATIENT)
Dept: GASTROENTEROLOGY | Facility: CLINIC | Age: 71
End: 2021-12-14

## 2021-12-14 DIAGNOSIS — K50.00 CROHN'S DISEASE OF SMALL INTESTINE WITHOUT COMPLICATION (HCC): Primary | ICD-10-CM

## 2021-12-14 NOTE — TELEPHONE ENCOUNTER
Rarely imuran can cause thrombocytopenia which would increase the risk of bleeding. But we have monitored his labs routinely including his platelet count which have always been within normal limits including the last time checked in October.  Thus I do not suspect the imuran as the likely etiology.  I do think it is reasonable for him to have his labs checked.  Have him get a cbc and cmp.

## 2021-12-14 NOTE — TELEPHONE ENCOUNTER
Pt wife states pt had a nose bleed about a month ago and recently fell and developed blood in his urine. He has petechiae on his chest and flank. She would like to know if this may be due to imuran. He is also seeing Dr Jewell.

## 2021-12-23 ENCOUNTER — LAB (OUTPATIENT)
Dept: LAB | Facility: HOSPITAL | Age: 71
End: 2021-12-23

## 2021-12-23 DIAGNOSIS — K50.00 CROHN'S DISEASE OF SMALL INTESTINE WITHOUT COMPLICATION (HCC): ICD-10-CM

## 2021-12-23 LAB
ALBUMIN SERPL-MCNC: 4.1 G/DL (ref 3.5–5.2)
ALBUMIN/GLOB SERPL: 1.6 G/DL
ALP SERPL-CCNC: 111 U/L (ref 39–117)
ALT SERPL W P-5'-P-CCNC: 14 U/L (ref 1–41)
ANION GAP SERPL CALCULATED.3IONS-SCNC: 6 MMOL/L (ref 5–15)
AST SERPL-CCNC: 16 U/L (ref 1–40)
BASOPHILS # BLD AUTO: 0.05 10*3/MM3 (ref 0–0.2)
BASOPHILS NFR BLD AUTO: 1 % (ref 0–1.5)
BILIRUB SERPL-MCNC: 0.4 MG/DL (ref 0–1.2)
BUN SERPL-MCNC: 14 MG/DL (ref 8–23)
BUN/CREAT SERPL: 13.9 (ref 7–25)
CALCIUM SPEC-SCNC: 9.1 MG/DL (ref 8.6–10.5)
CHLORIDE SERPL-SCNC: 105 MMOL/L (ref 98–107)
CO2 SERPL-SCNC: 30 MMOL/L (ref 22–29)
CREAT SERPL-MCNC: 1.01 MG/DL (ref 0.76–1.27)
DEPRECATED RDW RBC AUTO: 42.7 FL (ref 37–54)
EOSINOPHIL # BLD AUTO: 0.1 10*3/MM3 (ref 0–0.4)
EOSINOPHIL NFR BLD AUTO: 2 % (ref 0.3–6.2)
ERYTHROCYTE [DISTWIDTH] IN BLOOD BY AUTOMATED COUNT: 12.4 % (ref 12.3–15.4)
GFR SERPL CREATININE-BSD FRML MDRD: 73 ML/MIN/1.73
GLOBULIN UR ELPH-MCNC: 2.5 GM/DL
GLUCOSE SERPL-MCNC: 105 MG/DL (ref 65–99)
HCT VFR BLD AUTO: 45.8 % (ref 37.5–51)
HGB BLD-MCNC: 15.2 G/DL (ref 13–17.7)
IMM GRANULOCYTES # BLD AUTO: 0.02 10*3/MM3 (ref 0–0.05)
IMM GRANULOCYTES NFR BLD AUTO: 0.4 % (ref 0–0.5)
LYMPHOCYTES # BLD AUTO: 0.66 10*3/MM3 (ref 0.7–3.1)
LYMPHOCYTES NFR BLD AUTO: 13.1 % (ref 19.6–45.3)
MCH RBC QN AUTO: 31.4 PG (ref 26.6–33)
MCHC RBC AUTO-ENTMCNC: 33.2 G/DL (ref 31.5–35.7)
MCV RBC AUTO: 94.6 FL (ref 79–97)
MONOCYTES # BLD AUTO: 0.36 10*3/MM3 (ref 0.1–0.9)
MONOCYTES NFR BLD AUTO: 7.2 % (ref 5–12)
NEUTROPHILS NFR BLD AUTO: 3.84 10*3/MM3 (ref 1.7–7)
NEUTROPHILS NFR BLD AUTO: 76.3 % (ref 42.7–76)
NRBC BLD AUTO-RTO: 0 /100 WBC (ref 0–0.2)
PLATELET # BLD AUTO: 204 10*3/MM3 (ref 140–450)
PMV BLD AUTO: 9.9 FL (ref 6–12)
POTASSIUM SERPL-SCNC: 4.2 MMOL/L (ref 3.5–5.2)
PROT SERPL-MCNC: 6.6 G/DL (ref 6–8.5)
RBC # BLD AUTO: 4.84 10*6/MM3 (ref 4.14–5.8)
SODIUM SERPL-SCNC: 141 MMOL/L (ref 136–145)
WBC NRBC COR # BLD: 5.03 10*3/MM3 (ref 3.4–10.8)

## 2021-12-23 PROCEDURE — 85025 COMPLETE CBC W/AUTO DIFF WBC: CPT

## 2021-12-23 PROCEDURE — 36415 COLL VENOUS BLD VENIPUNCTURE: CPT

## 2021-12-23 PROCEDURE — 80053 COMPREHEN METABOLIC PANEL: CPT

## 2022-01-03 ENCOUNTER — PROCEDURE VISIT (OUTPATIENT)
Dept: UROLOGY | Facility: CLINIC | Age: 72
End: 2022-01-03

## 2022-01-03 DIAGNOSIS — R31.0 GROSS HEMATURIA: Primary | ICD-10-CM

## 2022-01-03 PROCEDURE — 52000 CYSTOURETHROSCOPY: CPT | Performed by: UROLOGY

## 2022-01-03 PROCEDURE — 81001 URINALYSIS AUTO W/SCOPE: CPT | Performed by: UROLOGY

## 2022-01-04 NOTE — PROGRESS NOTES
Pre- operative diagnosis:  Hematuria    Post operative diagnosis:  BPH    Procedure:  The patient was prepped and draped in a normal sterile fashion.  The urethra was anesthetized with 2% lidocaine jelly.  A flexible cystoscope was introduced per urethra.      Urethra:  Normal    Bladder:  Normal mucosa, No bladder tumor and moderate trabeculation    Ureteral orifices:  Normal position bilaterally and Clear efflux bilaterally    Prostate:  lateral lobe hypertrophy and median lobe hypertrophy-with kissing lateral lobes and elevated bladder neck.  There were several friable prominent vessels on the prostatic urethra.    Patient tolerated the procedure well    Complications: none    Blood loss: minimal    Follow up:    Routine follow up.  I recommended getting a CT urogram to complete his hematuria evaluation.  We discussed the possible prostatic source for his prior hematuria given cystoscopic findings of prominent friable prostatic vessels today.  I will contact him regarding his CT scan results.  He will otherwise follow-up with me as scheduled in October.  We discussed consideration for starting finasteride as needed for recurrent prostatic bleeding in the future.

## 2022-01-06 ENCOUNTER — HOSPITAL ENCOUNTER (OUTPATIENT)
Dept: CT IMAGING | Facility: HOSPITAL | Age: 72
Discharge: HOME | End: 2022-01-06
Admitting: UROLOGY

## 2022-01-06 DIAGNOSIS — R31.0 GROSS HEMATURIA: ICD-10-CM

## 2022-01-06 LAB — CREAT BLDA-MCNC: 1 MG/DL (ref 0.6–1.3)

## 2022-01-06 PROCEDURE — 25010000002 IOPAMIDOL 61 % SOLUTION: Performed by: UROLOGY

## 2022-01-06 PROCEDURE — 82565 ASSAY OF CREATININE: CPT

## 2022-01-06 PROCEDURE — 74178 CT ABD&PLV WO CNTR FLWD CNTR: CPT

## 2022-01-06 RX ADMIN — IOPAMIDOL 100 ML: 612 INJECTION, SOLUTION INTRAVENOUS at 13:43

## 2022-02-07 ENCOUNTER — OFFICE VISIT (OUTPATIENT)
Dept: GASTROENTEROLOGY | Facility: CLINIC | Age: 72
End: 2022-02-07

## 2022-02-07 VITALS
SYSTOLIC BLOOD PRESSURE: 130 MMHG | HEART RATE: 86 BPM | TEMPERATURE: 97.3 F | WEIGHT: 182 LBS | OXYGEN SATURATION: 99 % | HEIGHT: 72 IN | DIASTOLIC BLOOD PRESSURE: 80 MMHG | BODY MASS INDEX: 24.65 KG/M2

## 2022-02-07 DIAGNOSIS — Z79.899 ENCOUNTER FOR MONITORING IMMUNOMODULATING THERAPY: ICD-10-CM

## 2022-02-07 DIAGNOSIS — K50.00 CROHN'S DISEASE OF SMALL INTESTINE WITHOUT COMPLICATION: Primary | ICD-10-CM

## 2022-02-07 DIAGNOSIS — Z51.81 ENCOUNTER FOR MONITORING IMMUNOMODULATING THERAPY: ICD-10-CM

## 2022-02-07 PROCEDURE — 99214 OFFICE O/P EST MOD 30 MIN: CPT | Performed by: INTERNAL MEDICINE

## 2022-02-07 NOTE — PROGRESS NOTES
[facility]    Chief Complaint   Patient presents with   • Crohn's Disease       Subjective     HPI    Vinicio Ring is a 71 y.o. male who presents with a chief complaint of Crohn's.    It is been 6 months since he was last here.  He tells me he is doing well.  Denies any abdominal cramps.  No diarrhea, blood in stools or mucus.  Is been over a year and a half or longer since he has had to use steroids.  His weights been stable.  Continues on Imuran.  He does note a rash across his upper abdomen lower chest anteriorly only.  It is not anywhere else.  He states has been there for a year.  Does itch at times.  He believes last time he showed dermatology they thought was bug bites.  He states he had an annual visit scheduled in August or September but he had to cancel.  So he thinks the rash has been there a year and a half or more.    Everything else is going well.  He continues to be active at work and doing other projects.        ========== copy of 7/19/2021 HPI====================================     HPI     Vinicio Ring is a 70 y.o. male who presents with a chief complaint of Crohn's.     He tells me he is doing well.  He has had no flares in the 6 months since he was here in the office.  He states he and several other people came down with food poisoning month or 2 ago.  That was short-lived.  He has had no abdominal cramps, diarrhea or any of his prior GI symptoms.  He has not had to use a Medrol Dosepak in over a year.  He is happy with things stands.     When he was here in January talked about transitioning from Imuran therapy to Humira.  Since he was asymptomatic and doing well he chose to stay with Imuran therapy which was quite reasonable.  As a reminder, he last had a colonoscopy November 2019 noting 1 small ulcer in his terminal ileum.     ========== January 2021 HPI======================================  HPI     Vinicio Ring is a 70 y.o. male who presents with a  chief complaint of Crohn's.     He comes in for 4-month checkup.  He states he is doing well.  States he may have had 2 episodes of minor discomfort in his abdomen since then.  He has not had take any steroids for a year.  His weights been stable.  He has good appetite.  Denies any diarrhea.  He has formed stools.  No fever chills sweats.  No arthralgias.  Everything is going well with him.     When he was here in September we had discussed the option of step up therapy with adding Humira to his Imuran and then after several months of dual therapy with drawl the Imuran.  We talked about the pros and cons of that then and we discussed this again today.     ================== September 21, 2020 HPI===================================  HPI     Vinicio Ring is a 69 y.o. male who presents with a chief complaint of Crohn's     When he was here in May was having improvement after tapering off Entocort.  I talked to him about going to every other day with the Entocort for 14 days then off.  We have talked about step up therapy.  We talked about the option of Humira.  He wished to see how things went.  He presents now for follow-up evaluation.     He tells me he is doing well.  He is not had use any steroids since he being on Entocort.  He finished Entocort in May.  He states he may get a little twinge of abdominal discomfort but nothing that he can handle.  Nothing that he has to take any medications for.  Occasionally has a little bit of right flank pain.  Denies any blood in his urine.  Denies diarrhea or blood in stools.  Has had no fever chills sweats.  He is gained 1 or 2 pounds.  He overall feels well.     During this coronavirus, he has been self isolating.  He still working but he is able to do most of his work from home.  He is wearing a mask when he is out.     =================== May 11, 2020 HPI===================================================  HPI     Vinicio Ring is a 69 y.o. male who presents  with a chief complaint of Crohn's     When he was here in February we discussed the options of step up therapy to Humira and Imuran.  We discussed other options.  He chose not to pursue a biologic but to add Entocort to the Imuran.  See a copy of the assessment plan from that visit below.     He tells me he did well with the Entocort.  He had one episode of abdominal discomfort that was mild and lasted about 3 hours.  This started a couple days after he decreased the dose from 2 tablets daily 1 tablet daily.  That is the only time he had any symptoms.  Otherwise he has been doing well.  He feels like he is gained a little bit of weight and is up 1 pound from February.  He is up about 4 pounds since December.     He denies any fever chills or sweats.  He has had no diarrhea.  He has had no nausea vomiting or than one little episode.  He has 11 days ago on the once a day Entocort.  He tells me he still working.  He is able to do most of his work from home at the [employer].  He has been self isolating for the most part.        ============================== February 13, 2020 assessment plan ========================================        We had a long discussion.  He still had one ulcer on colonoscopy exam.  He has some symptoms which could be from active disease higher up in the small bowel that we cannot identify or possibly even fibrotic tissue.  He does respond to Medrol Dosepak which makes me think it is active disease.  I told him I do not favor treating periodically with a Medrol Dosepak I think once in a blue moon it may be reasonable but not routinely.  I therefore favor changing therapy.  I discussed options.  One we could continue on the course we are doing which I do not strongly favor.  We could transition from azathioprine to a biologic such as Humira.  The third option discussed was continued azathioprine but add a 3-month tapering course of Entocort.     We had a long discussion about the pros and cons  of each option.  We talked about side effects of the medications and benefits of the medicines.  We talked about efficacy of the drugs.  I went into detail with him.  We have had these conversations in the past.  He informed me he still not ready to pursue the Biologics.  He does not want commit to that route.  He wishes to pursue option 3 with continue azathioprine but a tapering course of Entocort.  We did discuss the side effects of Entocort with it being a steroid but a high first-pass metabolism and what that means for side effects.     I will see him back in the office in 3 months.  He is going take Entocort 9 mg daily for 1 month then 6 mg daily for 1 month then 3 mg daily.  We talked about stopping Pentasa.  He wishes to stop the Pentasa and he will do so when this prescription runs out.  I did refill his prescription for Medrol Dosepak to have on standby as this has helped keep him out of the emergency room in the past.       Past Medical History:   Diagnosis Date   • Antral cyst 12/14/2017   • Arthritis    • Chronic rhinitis 12/14/2017   • Elevated PSA    • GERD (gastroesophageal reflux disease)    • Rosacea, unspecified        Past Surgical History:   Procedure Laterality Date   • CAPSULE ENDOSCOPY N/A 10/19/2016    Procedure: CAPSULE ENDOSCOPY AGILE;  Surgeon: Hector Mooney MD;  Location: [facility] ENDOSCOPY;  Service:    • CHOLECYSTECTOMY     • CHOLECYSTECTOMY     • COLONOSCOPY N/A 10/19/2016    Procedure: COLONOSCOPY WITH ANESTHESIA;  Surgeon: Hector Mooney MD;  Location: [facility] ENDOSCOPY;  Service:    • COLONOSCOPY N/A 12/17/2019    Procedure: COLONOSCOPY;  Surgeon: Hector Mooney MD;  Location: [facility] ENDOSCOPY;  Service: Gastroenterology   • ENDOSCOPY N/A 10/19/2016    Procedure: ESOPHAGOGASTRODUODENOSCOPY WITH ANESTHESIA;  Surgeon: Hector Mooney MD;  Location: [facility] ENDOSCOPY;  Service:          Current Outpatient Medications:   •  azaTHIOprine (IMURAN) 50 MG tablet, Take 1 tablet by  mouth Daily., Disp: 30 tablet, Rfl: 11  •  famotidine (Pepcid AC) 10 MG tablet, Take 1 tablet by mouth Every Night., Disp: , Rfl:   •  ondansetron (ZOFRAN) 8 MG tablet, Take 1 tablet by mouth Every 8 (Eight) Hours As Needed for Nausea or Vomiting., Disp: 10 tablet, Rfl: 1    No Known Allergies    Social History     Socioeconomic History   • Marital status:    Tobacco Use   • Smoking status: Never Smoker   • Smokeless tobacco: Never Used   Vaping Use   • Vaping Use: Never used   Substance and Sexual Activity   • Alcohol use: Yes     Alcohol/week: 14.0 standard drinks     Types: 14 Glasses of wine per week     Comment: Daily    • Drug use: No   • Sexual activity: Defer       Family History   Problem Relation Age of Onset   • No Known Problems Father    • No Known Problems Mother    • Colon cancer Maternal Aunt    • Colon polyps Paternal Uncle    • Heart disease Maternal Grandfather    • Heart disease Paternal Grandfather        Review of Systems  No abdominal pain, no change in bowel habits    Objective     Vitals:    02/07/22 1453   BP: 130/80   Pulse: 86   Temp: 97.3 °F (36.3 °C)   SpO2: 99%       Physical Exam  No acute distress. Vital signs as documented.  Sclera anicteric.  Neck without noticeable JVD. Lungs clear to auscultation. Heart exam notable for regular rhythm, normal sounds. Abdomen is soft, nontender, non distended, normal bowel sounds and without evidence of organomegaly, masses.  Neuro alert, moves extremities.  Skin: 1 is anterior upper abdomen lower chest there are several small slightly raised erythemic papules.  These are only a couple millimeters in size at most.  Does look like he scratched some of them.  This is dry.  There is no surrounding erythema or discharge.  It is all superficial.        Assessment/Plan   Problem List Items Addressed This Visit        Gastrointestinal Abdominal     Crohn's disease of small intestine without complication (HCC) - Primary    Overview     Diagnosed in  2016.  Symptoms manifest by intermittent intestinal obstruction.  Prometheus positive for IBD.  Colonoscopy December 2019 revealed 1 healing ulcer in the terminal ileum 20 cm from the IC valve            Health Encounters    Encounter for monitoring immunomodulating therapy    Overview     TPMT enzyme levels are intermediate which can possibly increase risk of toxicity with Imuran at high doses.    T Spot negative June 2017, 2/2019, 5/2020  Test x-ray ordered August 2018  Patient has received the Pneumovax, as well as the shingles vaccine.  He did receive the flu shot for the 2017/18 season.  He will need repeated next season  He sees dermatology, Dr. Taylor, routinely and was advised to continue and why.  Hepatitis A and B vaccinations ordered August 2018, as of 5/9/19 has had a/b vaccinations and is due for 6 month booster in 3 mos.  Vaccinated against coronavirus January 2021                  Regarding his Crohn's disease he remains asymptomatic.  He is up-to-date on his labs I do recommend repeat CBC and CMP at the end of March approximately.  I reviewed the labs he had in December with him.  He should get a TB test with his labs at the end of March.  We discussed this.  We will continue to to monitor with the labs.  He will come back to see us in 6 months.  He will continue with the Imuran.  We discussed immunosuppression therapy and how it can lead to increased risk of cancer such as skin cancer.  The rash on his abdomen is not consistent with skin cancer it does not look to be related to the Imuran.  Does not have the picture of psoriasis.  I informed him I do not exactly know what it is but have not seen this rash with Imuran use in the past with other individuals.  I strongly recommend that he go see dermatology.  He is a little past due for his annual visit and he agreed to make that appointment.  Lastly, he did ask me about bone marrow broth.  I talked to him about over-the-counter supplements and how they  may or may not be beneficial and if he is going to use them use only in recommended doses and not excessive doses.  There are none that I am actively promoting at this time.    We will see him back in the office in 6 months.    Continue ongoing management by primary care provider and other specialists.     Body mass index is 24.68 kg/m².  Stable BMI, no GI intervention recommended      EMR Dragon/transcription disclaimer:  Much of this encounter note is electronic transcription/translation of spoken language to printed text.  The electronic translation of spoken language may be erroneous, or at times, nonsensical words or phrases may be inadvertently transcribed.  Although I have reviewed the note for such errors, some may still exist.    Hector Mooney MD  15:48 CST  02/07/22

## 2022-04-04 DIAGNOSIS — K50.00 CROHN'S DISEASE OF SMALL INTESTINE WITHOUT COMPLICATION: Primary | ICD-10-CM

## 2022-04-12 ENCOUNTER — LAB (OUTPATIENT)
Dept: LAB | Facility: HOSPITAL | Age: 72
End: 2022-04-12

## 2022-04-12 DIAGNOSIS — K50.00 CROHN'S DISEASE OF SMALL INTESTINE WITHOUT COMPLICATION: ICD-10-CM

## 2022-04-12 LAB
ALBUMIN SERPL-MCNC: 4.2 G/DL (ref 3.5–5.2)
ALBUMIN/GLOB SERPL: 2 G/DL
ALP SERPL-CCNC: 113 U/L (ref 39–117)
ALT SERPL W P-5'-P-CCNC: 12 U/L (ref 1–41)
ANION GAP SERPL CALCULATED.3IONS-SCNC: 9 MMOL/L (ref 5–15)
AST SERPL-CCNC: 19 U/L (ref 1–40)
BASOPHILS # BLD AUTO: 0.04 10*3/MM3 (ref 0–0.2)
BASOPHILS NFR BLD AUTO: 0.8 % (ref 0–1.5)
BILIRUB SERPL-MCNC: 0.4 MG/DL (ref 0–1.2)
BUN SERPL-MCNC: 15 MG/DL (ref 8–23)
BUN/CREAT SERPL: 14.4 (ref 7–25)
CALCIUM SPEC-SCNC: 9.1 MG/DL (ref 8.6–10.5)
CHLORIDE SERPL-SCNC: 105 MMOL/L (ref 98–107)
CO2 SERPL-SCNC: 27 MMOL/L (ref 22–29)
CREAT SERPL-MCNC: 1.04 MG/DL (ref 0.76–1.27)
DEPRECATED RDW RBC AUTO: 42.5 FL (ref 37–54)
EGFRCR SERPLBLD CKD-EPI 2021: 77.7 ML/MIN/1.73
EOSINOPHIL # BLD AUTO: 0.07 10*3/MM3 (ref 0–0.4)
EOSINOPHIL NFR BLD AUTO: 1.4 % (ref 0.3–6.2)
ERYTHROCYTE [DISTWIDTH] IN BLOOD BY AUTOMATED COUNT: 12.4 % (ref 12.3–15.4)
GLOBULIN UR ELPH-MCNC: 2.1 GM/DL
GLUCOSE SERPL-MCNC: 107 MG/DL (ref 65–99)
HCT VFR BLD AUTO: 44.5 % (ref 37.5–51)
HGB BLD-MCNC: 15.2 G/DL (ref 13–17.7)
IMM GRANULOCYTES # BLD AUTO: 0.02 10*3/MM3 (ref 0–0.05)
IMM GRANULOCYTES NFR BLD AUTO: 0.4 % (ref 0–0.5)
LYMPHOCYTES # BLD AUTO: 0.77 10*3/MM3 (ref 0.7–3.1)
LYMPHOCYTES NFR BLD AUTO: 14.9 % (ref 19.6–45.3)
MCH RBC QN AUTO: 31.8 PG (ref 26.6–33)
MCHC RBC AUTO-ENTMCNC: 34.2 G/DL (ref 31.5–35.7)
MCV RBC AUTO: 93.1 FL (ref 79–97)
MONOCYTES # BLD AUTO: 0.36 10*3/MM3 (ref 0.1–0.9)
MONOCYTES NFR BLD AUTO: 7 % (ref 5–12)
NEUTROPHILS NFR BLD AUTO: 3.91 10*3/MM3 (ref 1.7–7)
NEUTROPHILS NFR BLD AUTO: 75.5 % (ref 42.7–76)
NRBC BLD AUTO-RTO: 0 /100 WBC (ref 0–0.2)
PLATELET # BLD AUTO: 199 10*3/MM3 (ref 140–450)
PMV BLD AUTO: 9.9 FL (ref 6–12)
POTASSIUM SERPL-SCNC: 4.2 MMOL/L (ref 3.5–5.2)
PROT SERPL-MCNC: 6.3 G/DL (ref 6–8.5)
RBC # BLD AUTO: 4.78 10*6/MM3 (ref 4.14–5.8)
SODIUM SERPL-SCNC: 141 MMOL/L (ref 136–145)
WBC NRBC COR # BLD: 5.17 10*3/MM3 (ref 3.4–10.8)

## 2022-04-12 PROCEDURE — 80053 COMPREHEN METABOLIC PANEL: CPT

## 2022-04-12 PROCEDURE — 36415 COLL VENOUS BLD VENIPUNCTURE: CPT

## 2022-04-12 PROCEDURE — 86480 TB TEST CELL IMMUN MEASURE: CPT

## 2022-04-12 PROCEDURE — 85025 COMPLETE CBC W/AUTO DIFF WBC: CPT

## 2022-04-14 LAB
GAMMA INTERFERON BACKGROUND BLD IA-ACNC: 0.05 IU/ML
M TB IFN-G BLD-IMP: NEGATIVE
M TB IFN-G CD4+ BCKGRND COR BLD-ACNC: 0.05 IU/ML
M TB IFN-G CD4+CD8+ BCKGRND COR BLD-ACNC: 0.05 IU/ML
MITOGEN IGNF BLD-ACNC: >10 IU/ML
QUANTIFERON INCUBATION: NORMAL
SERVICE CMNT-IMP: NORMAL

## 2022-07-07 RX ORDER — AZATHIOPRINE 50 MG/1
50 TABLET ORAL DAILY
Qty: 30 TABLET | Refills: 7 | Status: SHIPPED | OUTPATIENT
Start: 2022-07-07 | End: 2023-03-07 | Stop reason: SDUPTHER

## 2022-09-06 ENCOUNTER — TRANSCRIBE ORDERS (OUTPATIENT)
Dept: ADMINISTRATIVE | Facility: HOSPITAL | Age: 72
End: 2022-09-06

## 2022-09-06 ENCOUNTER — LAB (OUTPATIENT)
Dept: LAB | Facility: HOSPITAL | Age: 72
End: 2022-09-06

## 2022-09-06 DIAGNOSIS — E29.1 TESTICULAR HYPOFUNCTION: ICD-10-CM

## 2022-09-06 DIAGNOSIS — K21.9 GASTROESOPHAGEAL REFLUX DISEASE WITHOUT ESOPHAGITIS: ICD-10-CM

## 2022-09-06 DIAGNOSIS — Z00.01 ENCOUNTER FOR GENERAL ADULT MEDICAL EXAMINATION WITH ABNORMAL FINDINGS: ICD-10-CM

## 2022-09-06 DIAGNOSIS — Z12.5 ENCOUNTER FOR SCREENING FOR MALIGNANT NEOPLASM OF PROSTATE: ICD-10-CM

## 2022-09-06 DIAGNOSIS — D51.0 VITAMIN B12 DEFICIENCY ANEMIA DUE TO INTRINSIC FACTOR DEFICIENCY: ICD-10-CM

## 2022-09-06 DIAGNOSIS — K50.90 CROHN'S DISEASE WITHOUT COMPLICATION, UNSPECIFIED GASTROINTESTINAL TRACT LOCATION: ICD-10-CM

## 2022-09-06 DIAGNOSIS — Z00.00 ROUTINE GENERAL MEDICAL EXAMINATION AT A HEALTH CARE FACILITY: Primary | ICD-10-CM

## 2022-09-06 DIAGNOSIS — Z00.00 ROUTINE GENERAL MEDICAL EXAMINATION AT A HEALTH CARE FACILITY: ICD-10-CM

## 2022-09-06 LAB
ALBUMIN SERPL-MCNC: 3.9 G/DL (ref 3.5–5.2)
ALBUMIN/GLOB SERPL: 1.7 G/DL
ALP SERPL-CCNC: 92 U/L (ref 39–117)
ALT SERPL W P-5'-P-CCNC: 13 U/L (ref 1–41)
ANION GAP SERPL CALCULATED.3IONS-SCNC: 5.3 MMOL/L (ref 5–15)
AST SERPL-CCNC: 16 U/L (ref 1–40)
BACTERIA UR QL AUTO: NORMAL /HPF
BILIRUB SERPL-MCNC: 0.6 MG/DL (ref 0–1.2)
BILIRUB UR QL STRIP: NEGATIVE
BUN SERPL-MCNC: 13 MG/DL (ref 8–23)
BUN/CREAT SERPL: 11.9 (ref 7–25)
CALCIUM SPEC-SCNC: 8.8 MG/DL (ref 8.6–10.5)
CHLORIDE SERPL-SCNC: 107 MMOL/L (ref 98–107)
CHOLEST SERPL-MCNC: 158 MG/DL (ref 0–200)
CLARITY UR: CLEAR
CO2 SERPL-SCNC: 25.7 MMOL/L (ref 22–29)
COLOR UR: YELLOW
CREAT SERPL-MCNC: 1.09 MG/DL (ref 0.76–1.27)
DEPRECATED RDW RBC AUTO: 41.4 FL (ref 37–54)
EGFRCR SERPLBLD CKD-EPI 2021: 73.5 ML/MIN/1.73
ERYTHROCYTE [DISTWIDTH] IN BLOOD BY AUTOMATED COUNT: 12 % (ref 12.3–15.4)
GLOBULIN UR ELPH-MCNC: 2.3 GM/DL
GLUCOSE SERPL-MCNC: 103 MG/DL (ref 65–99)
GLUCOSE UR STRIP-MCNC: NEGATIVE MG/DL
HCT VFR BLD AUTO: 44.7 % (ref 37.5–51)
HDLC SERPL-MCNC: 49 MG/DL (ref 40–60)
HGB BLD-MCNC: 15 G/DL (ref 13–17.7)
HGB UR QL STRIP.AUTO: NEGATIVE
HYALINE CASTS UR QL AUTO: NORMAL /LPF
KETONES UR QL STRIP: NEGATIVE
LDLC SERPL CALC-MCNC: 92 MG/DL (ref 0–100)
LDLC/HDLC SERPL: 1.85 {RATIO}
LEUKOCYTE ESTERASE UR QL STRIP.AUTO: ABNORMAL
MCH RBC QN AUTO: 31.6 PG (ref 26.6–33)
MCHC RBC AUTO-ENTMCNC: 33.6 G/DL (ref 31.5–35.7)
MCV RBC AUTO: 94.1 FL (ref 79–97)
NITRITE UR QL STRIP: NEGATIVE
PH UR STRIP.AUTO: 7 [PH] (ref 5–8)
PLATELET # BLD AUTO: 194 10*3/MM3 (ref 140–450)
PMV BLD AUTO: 10 FL (ref 6–12)
POTASSIUM SERPL-SCNC: 4.4 MMOL/L (ref 3.5–5.2)
PROT SERPL-MCNC: 6.2 G/DL (ref 6–8.5)
PROT UR QL STRIP: NEGATIVE
PSA SERPL-MCNC: 4.47 NG/ML (ref 0–4)
RBC # BLD AUTO: 4.75 10*6/MM3 (ref 4.14–5.8)
RBC # UR STRIP: NORMAL /HPF
REF LAB TEST METHOD: NORMAL
SODIUM SERPL-SCNC: 138 MMOL/L (ref 136–145)
SP GR UR STRIP: 1.02 (ref 1–1.03)
SQUAMOUS #/AREA URNS HPF: NORMAL /HPF
TESTOST SERPL-MCNC: 439 NG/DL (ref 193–740)
TRIGL SERPL-MCNC: 91 MG/DL (ref 0–150)
UROBILINOGEN UR QL STRIP: ABNORMAL
VIT B12 BLD-MCNC: 228 PG/ML (ref 211–946)
VLDLC SERPL-MCNC: 17 MG/DL (ref 5–40)
WBC # UR STRIP: NORMAL /HPF
WBC NRBC COR # BLD: 4.93 10*3/MM3 (ref 3.4–10.8)

## 2022-09-06 PROCEDURE — G0103 PSA SCREENING: HCPCS

## 2022-09-06 PROCEDURE — 81001 URINALYSIS AUTO W/SCOPE: CPT

## 2022-09-06 PROCEDURE — 80061 LIPID PANEL: CPT

## 2022-09-06 PROCEDURE — 80053 COMPREHEN METABOLIC PANEL: CPT

## 2022-09-06 PROCEDURE — 82607 VITAMIN B-12: CPT

## 2022-09-06 PROCEDURE — 36415 COLL VENOUS BLD VENIPUNCTURE: CPT

## 2022-09-06 PROCEDURE — 84153 ASSAY OF PSA TOTAL: CPT

## 2022-09-06 PROCEDURE — 85027 COMPLETE CBC AUTOMATED: CPT

## 2022-09-06 PROCEDURE — 84403 ASSAY OF TOTAL TESTOSTERONE: CPT

## 2023-03-07 RX ORDER — AZATHIOPRINE 50 MG/1
50 TABLET ORAL DAILY
Qty: 30 TABLET | Refills: 7 | Status: SHIPPED | OUTPATIENT
Start: 2023-03-07

## 2023-03-25 NOTE — PROGRESS NOTES
Spoke with patient, note dictated just after telephone communication.  Patient has Crohn's ileitis.  Followed by Dr. Mooney.  Currently on Imuran, has been getting good results until just recently, now having lower abdominal cramping pain.  He says that typically he gets prescribed a Medrol Dosepak and that is usually enough to get him through this type of attack.  His chart is reviewed and history confirmed.  Called in prescription for Medrol Dosepak to [facility], [place], [phone].  Alarm symptoms reviewed and patient advised to go to emergency room immediately if they develop, otherwise check with his gastroenterologist early next week.    Brandyn Bravo MD  14:48 CDT

## 2023-03-29 ENCOUNTER — TELEPHONE (OUTPATIENT)
Dept: GASTROENTEROLOGY | Facility: CLINIC | Age: 73
End: 2023-03-29
Payer: COMMERCIAL

## 2023-03-29 NOTE — TELEPHONE ENCOUNTER
Pt states he recently had a flare and I see where Dr Bravo prescribed a medrol dose pack. Pt would like for Dr Mooney to prescribe another medrol dose pack and send it to our pharm. [place] is going [place] of the [place] in 3 [place] and wants to have the medication on had if he has another flair. Kadi is scheduling him an appointment with Dr Mooney as his schedule will allow.

## 2023-04-13 ENCOUNTER — OFFICE VISIT (OUTPATIENT)
Dept: GASTROENTEROLOGY | Facility: CLINIC | Age: 73
End: 2023-04-13
Payer: COMMERCIAL

## 2023-04-13 ENCOUNTER — LAB (OUTPATIENT)
Dept: LAB | Facility: HOSPITAL | Age: 73
End: 2023-04-13
Payer: COMMERCIAL

## 2023-04-13 VITALS
SYSTOLIC BLOOD PRESSURE: 126 MMHG | WEIGHT: 173 LBS | DIASTOLIC BLOOD PRESSURE: 74 MMHG | BODY MASS INDEX: 23.43 KG/M2 | OXYGEN SATURATION: 97 % | HEART RATE: 79 BPM | TEMPERATURE: 96.6 F | HEIGHT: 72 IN

## 2023-04-13 DIAGNOSIS — Z51.81 ENCOUNTER FOR MONITORING IMMUNOMODULATING THERAPY: ICD-10-CM

## 2023-04-13 DIAGNOSIS — K50.00 CROHN'S DISEASE OF SMALL INTESTINE WITHOUT COMPLICATION: Primary | ICD-10-CM

## 2023-04-13 DIAGNOSIS — Z79.899 ENCOUNTER FOR MONITORING IMMUNOMODULATING THERAPY: ICD-10-CM

## 2023-04-13 DIAGNOSIS — K50.00 CROHN'S DISEASE OF SMALL INTESTINE WITHOUT COMPLICATION: ICD-10-CM

## 2023-04-13 LAB
ALBUMIN SERPL-MCNC: 4.2 G/DL (ref 3.5–5.2)
ALBUMIN/GLOB SERPL: 2 G/DL
ALP SERPL-CCNC: 106 U/L (ref 39–117)
ALT SERPL W P-5'-P-CCNC: 15 U/L (ref 1–41)
ANION GAP SERPL CALCULATED.3IONS-SCNC: 8 MMOL/L (ref 5–15)
AST SERPL-CCNC: 19 U/L (ref 1–40)
BASOPHILS # BLD AUTO: 0.03 10*3/MM3 (ref 0–0.2)
BASOPHILS NFR BLD AUTO: 0.6 % (ref 0–1.5)
BILIRUB SERPL-MCNC: 0.4 MG/DL (ref 0–1.2)
BUN SERPL-MCNC: 15 MG/DL (ref 8–23)
BUN/CREAT SERPL: 16.5 (ref 7–25)
CALCIUM SPEC-SCNC: 9.2 MG/DL (ref 8.6–10.5)
CHLORIDE SERPL-SCNC: 104 MMOL/L (ref 98–107)
CO2 SERPL-SCNC: 29 MMOL/L (ref 22–29)
CREAT SERPL-MCNC: 0.91 MG/DL (ref 0.76–1.27)
DEPRECATED RDW RBC AUTO: 43.8 FL (ref 37–54)
EGFRCR SERPLBLD CKD-EPI 2021: 90.7 ML/MIN/1.73
EOSINOPHIL # BLD AUTO: 0.05 10*3/MM3 (ref 0–0.4)
EOSINOPHIL NFR BLD AUTO: 1 % (ref 0.3–6.2)
ERYTHROCYTE [DISTWIDTH] IN BLOOD BY AUTOMATED COUNT: 12.5 % (ref 12.3–15.4)
GLOBULIN UR ELPH-MCNC: 2.1 GM/DL
GLUCOSE SERPL-MCNC: 174 MG/DL (ref 65–99)
HCT VFR BLD AUTO: 44.6 % (ref 37.5–51)
HGB BLD-MCNC: 14.3 G/DL (ref 13–17.7)
IMM GRANULOCYTES # BLD AUTO: 0.02 10*3/MM3 (ref 0–0.05)
IMM GRANULOCYTES NFR BLD AUTO: 0.4 % (ref 0–0.5)
LYMPHOCYTES # BLD AUTO: 0.78 10*3/MM3 (ref 0.7–3.1)
LYMPHOCYTES NFR BLD AUTO: 15.9 % (ref 19.6–45.3)
MCH RBC QN AUTO: 30.5 PG (ref 26.6–33)
MCHC RBC AUTO-ENTMCNC: 32.1 G/DL (ref 31.5–35.7)
MCV RBC AUTO: 95.1 FL (ref 79–97)
MONOCYTES # BLD AUTO: 0.33 10*3/MM3 (ref 0.1–0.9)
MONOCYTES NFR BLD AUTO: 6.7 % (ref 5–12)
NEUTROPHILS NFR BLD AUTO: 3.7 10*3/MM3 (ref 1.7–7)
NEUTROPHILS NFR BLD AUTO: 75.4 % (ref 42.7–76)
NRBC BLD AUTO-RTO: 0 /100 WBC (ref 0–0.2)
PLATELET # BLD AUTO: 183 10*3/MM3 (ref 140–450)
PMV BLD AUTO: 9.8 FL (ref 6–12)
POTASSIUM SERPL-SCNC: 4 MMOL/L (ref 3.5–5.2)
PROT SERPL-MCNC: 6.3 G/DL (ref 6–8.5)
RBC # BLD AUTO: 4.69 10*6/MM3 (ref 4.14–5.8)
SODIUM SERPL-SCNC: 141 MMOL/L (ref 136–145)
WBC NRBC COR # BLD: 4.91 10*3/MM3 (ref 3.4–10.8)

## 2023-04-13 PROCEDURE — 99213 OFFICE O/P EST LOW 20 MIN: CPT | Performed by: INTERNAL MEDICINE

## 2023-04-13 PROCEDURE — 85025 COMPLETE CBC W/AUTO DIFF WBC: CPT

## 2023-04-13 PROCEDURE — 36415 COLL VENOUS BLD VENIPUNCTURE: CPT

## 2023-04-13 PROCEDURE — 86480 TB TEST CELL IMMUN MEASURE: CPT

## 2023-04-13 PROCEDURE — 84433 ASY THIOPURIN S-MTHYLTRNSFRS: CPT

## 2023-04-13 PROCEDURE — 80053 COMPREHEN METABOLIC PANEL: CPT

## 2023-04-13 NOTE — PROGRESS NOTES
[facility]    Chief Complaint   Patient presents with   • Crohn's Disease       Subjective     HPI    Vinicio Ring is a 72 y.o. male who presents with a chief complaint of Crohn's    He did contact the office in March stating that he had a flare of his Crohn's and that he was treated by another physician with a Medrol Dosepak.  He requested a refill to keep one on hand as he has been traveling.  He tells me that episode lasted about 6 hours.  He started a Medrol Dosepak and felt relief almost instantly.  He had another episode when he was traveling in [place] back in the fall.  He did not have any steroids at that time.  It started about 4 PM he ate dinner and got worse over the night.  It ended about 4 AM.  He states he has significant cramping.  Did not have any bloating or nausea vomiting with either episode.  Both episodes manifest by intense cramping.  Does not recall any diarrhea after the episodes.    He feels well now.  He is back to his normal self.  Appetite is good.  Denies any symptoms.  Continues on Imuran 50 mg daily.  He has been on this dose secondary to intermediate enzyme activity.    ================= copy of February 7, 2022 HPI=======================  HPI     Vinicio Ring is a 71 y.o. male who presents with a chief complaint of Crohn's.     It is been 6 months since he was last here.  He tells me he is doing well.  Denies any abdominal cramps.  No diarrhea, blood in stools or mucus.  Is been over a year and a half or longer since he has had to use steroids.  His weights been stable.  Continues on Imuran.  He does note a rash across his upper abdomen lower chest anteriorly only.  It is not anywhere else.  He states has been there for a year.  Does itch at times.  He believes last time he showed dermatology they thought was bug bites.  He states he had an annual visit scheduled in August or September but he had to cancel.  So he thinks the rash has been there a year  and a half or more.     Everything else is going well.  He continues to be active at work and doing other projects.           ========== copy of 7/19/2021 HPI====================================     HPI     Vinicio Ring is a 70 y.o. male who presents with a chief complaint of Crohn's.     He tells me he is doing well.  He has had no flares in the 6 months since he was here in the office.  He states he and several other people came down with food poisoning month or 2 ago.  That was short-lived.  He has had no abdominal cramps, diarrhea or any of his prior GI symptoms.  He has not had to use a Medrol Dosepak in over a year.  He is happy with things stands.     When he was here in January talked about transitioning from Imuran therapy to Humira.  Since he was asymptomatic and doing well he chose to stay with Imuran therapy which was quite reasonable.  As a reminder, he last had a colonoscopy November 2019 noting 1 small ulcer in his terminal ileum.     ========== January 2021 HPI======================================  HPI     Vinicio Ring is a 70 y.o. male who presents with a chief complaint of Crohn's.     He comes in for 4-month checkup.  He states he is doing well.  States he may have had 2 episodes of minor discomfort in his abdomen since then.  He has not had take any steroids for a year.  His weights been stable.  He has good appetite.  Denies any diarrhea.  He has formed stools.  No fever chills sweats.  No arthralgias.  Everything is going well with him.     When he was here in September we had discussed the option of step up therapy with adding Humira to his Imuran and then after several months of dual therapy with drawl the Imuran.  We talked about the pros and cons of that then and we discussed this again today.     ================== September 21, 2020 HPI===================================  HPI     Vinicio MARTINO Jhonathan is a 69 y.o. male who presents with a chief complaint of Crohn's     When he  was here in May was having improvement after tapering off Entocort.  I talked to him about going to every other day with the Entocort for 14 days then off.  We have talked about step up therapy.  We talked about the option of Humira.  He wished to see how things went.  He presents now for follow-up evaluation.     He tells me he is doing well.  He is not had use any steroids since he being on Entocort.  He finished Entocort in May.  He states he may get a little twinge of abdominal discomfort but nothing that he can handle.  Nothing that he has to take any medications for.  Occasionally has a little bit of right flank pain.  Denies any blood in his urine.  Denies diarrhea or blood in stools.  Has had no fever chills sweats.  He is gained 1 or 2 pounds.  He overall feels well.     During this coronavirus, he has been self isolating.  He still working but he is able to do most of his work from home.  He is wearing a mask when he is out.     =================== May 11, 2020 HPI===================================================  HPI     Vinicio Ring is a 69 y.o. male who presents with a chief complaint of Crohn's     When he was here in February we discussed the options of step up therapy to Humira and Imuran.  We discussed other options.  He chose not to pursue a biologic but to add Entocort to the Imuran.  See a copy of the assessment plan from that visit below.     He tells me he did well with the Entocort.  He had one episode of abdominal discomfort that was mild and lasted about 3 hours.  This started a couple days after he decreased the dose from 2 tablets daily 1 tablet daily.  That is the only time he had any symptoms.  Otherwise he has been doing well.  He feels like he is gained a little bit of weight and is up 1 pound from February.  He is up about 4 pounds since December.     He denies any fever chills or sweats.  He has had no diarrhea.  He has had no nausea vomiting or than one little episode.  He  has 11 days ago on the once a day Entocort.  He tells me he still working.  He is able to do most of his work from home at the [facility] [employer].  He has been self isolating for the most part.        ============================== February 13, 2020 assessment plan ========================================        We had a long discussion.  He still had one ulcer on colonoscopy exam.  He has some symptoms which could be from active disease higher up in the small bowel that we cannot identify or possibly even fibrotic tissue.  He does respond to Medrol Dosepak which makes me think it is active disease.  I told him I do not favor treating periodically with a Medrol Dosepak I think once in a blue moon it may be reasonable but not routinely.  I therefore favor changing therapy.  I discussed options.  One we could continue on the course we are doing which I do not strongly favor.  We could transition from azathioprine to a biologic such as Humira.  The third option discussed was continued azathioprine but add a 3-month tapering course of Entocort.     We had a long discussion about the pros and cons of each option.  We talked about side effects of the medications and benefits of the medicines.  We talked about efficacy of the drugs.  I went into detail with him.  We have had these conversations in the past.  He informed me he still not ready to pursue the Biologics.  He does not want commit to that route.  He wishes to pursue option 3 with continue azathioprine but a tapering course of Entocort.  We did discuss the side effects of Entocort with it being a steroid but a high first-pass metabolism and what that means for side effects.     I will see him back in the office in 3 months.  He is going take Entocort 9 mg daily for 1 month then 6 mg daily for 1 month then 3 mg daily.  We talked about stopping Pentasa.  He wishes to stop the Pentasa and he will do so when this prescription runs out.  I did refill his prescription for  Medrol Dosepak to have on standby as this has helped keep him out of the emergency room in the past.          Past Medical History:   Diagnosis Date   • Antral cyst 12/14/2017   • Arthritis    • Chronic rhinitis 12/14/2017   • Elevated PSA    • GERD (gastroesophageal reflux disease)    • Rosacea, unspecified        Past Surgical History:   Procedure Laterality Date   • CAPSULE ENDOSCOPY N/A 10/19/2016    Procedure: CAPSULE ENDOSCOPY AGILE;  Surgeon: Hector Mooney MD;  Location: [facility] ENDOSCOPY;  Service:    • CHOLECYSTECTOMY     • CHOLECYSTECTOMY     • COLONOSCOPY N/A 10/19/2016    Procedure: COLONOSCOPY WITH ANESTHESIA;  Surgeon: Hector Mooney MD;  Location:  [facility] ENDOSCOPY;  Service:    • COLONOSCOPY N/A 12/17/2019    Procedure: COLONOSCOPY;  Surgeon: Hector Mooney MD;  Location: [facility] ENDOSCOPY;  Service: Gastroenterology   • ENDOSCOPY N/A 10/19/2016    Procedure: ESOPHAGOGASTRODUODENOSCOPY WITH ANESTHESIA;  Surgeon: Hector Mooney MD;  Location: [facility] ENDOSCOPY;  Service:          Current Outpatient Medications:   •  azaTHIOprine (IMURAN) 50 MG tablet, Take 1 tablet by mouth Daily., Disp: 30 tablet, Rfl: 7  •  famotidine (PEPCID) 10 MG tablet, Take 1 tablet by mouth Every Night., Disp: , Rfl:   •  methylPREDNISolone (Medrol) 4 MG dose pack, Take as directed on package (Patient taking differently: As Needed. Take as directed on package), Disp: 21 tablet, Rfl: 0  •  ondansetron (ZOFRAN) 8 MG tablet, Take 1 tablet by mouth Every 8 (Eight) Hours As Needed for Nausea or Vomiting., Disp: 10 tablet, Rfl: 1    No Known Allergies    Social History     Socioeconomic History   • Marital status:    Tobacco Use   • Smoking status: Never   • Smokeless tobacco: Never   Vaping Use   • Vaping Use: Never used   Substance and Sexual Activity   • Alcohol use: Yes     Alcohol/week: 14.0 standard drinks     Types: 14 Glasses of wine per week     Comment: Daily    • Drug use: No   • Sexual activity: Defer        Family History   Problem Relation Age of Onset   • No Known Problems Father    • No Known Problems Mother    • Colon cancer Maternal Aunt    • Colon polyps Paternal Uncle    • Heart disease Maternal Grandfather    • Heart disease Paternal Grandfather        Review of Systems  Currently feels well without symptoms    Objective     Vitals:    04/13/23 1414   BP: 126/74   Pulse: 79   Temp: 96.6 °F (35.9 °C)   SpO2: 97%       Physical Exam  No acute distress. Vital signs as documented.  Sclera anicteric.  Neck without noticeable JVD. Lungs clear to auscultation. Heart exam notable for regular rhythm, normal sounds. Abdomen is soft, nontender, non distended, normal bowel sounds and without evidence of organomegaly, masses.  Neuro alert, moves extremities.        Assessment & Plan   Problem List Items Addressed This Visit        Gastrointestinal Abdominal     Crohn's disease of small intestine without complication - Primary    Overview     Diagnosed in 2016.  Symptoms manifest by intermittent intestinal obstruction.  Prometheus positive for IBD.  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    T Spot negative June 2017, 2/2019, 5/2020, 4/2022  Test x-ray ordered August 2018  Patient has received the Pneumovax, as well as the shingles vaccine.  He did receive the flu shot for the 2017/18 season.  He will need repeated next season  He sees dermatology, Dr. Taylor, routinely and was advised to continue and why.  Hepatitis A and B vaccinations ordered August 2018, as of 5/9/19 has had a/b vaccinations and is due for 6  month booster in 3 mos.  Vaccinated against coronavirus January 2021          Relevant Orders    QuantiFERON-TB Gold Plus         Plan Long discussion.  He has had 2 episodes of abdominal cramping.  He describes a partial small bowel obstruction.  1 lasted 6 hours and he started a Medrol Dosepak the other 1 lasted 12 hours.  As discussed, I am not certain the Medrol Dosepak was the reason the 1 episode only lasted 6 hours but it is possible.  We discussed the difference between fibrotic disease and active disease.  We talked about investigation.  Capsule endoscopy as an option but an option with the risk of it becoming lodged requiring surgery to remove.  There are some centers that would do push enteroscopy but those are few and there is associated risk with that.  I believe the risk outweigh the benefit at this time.  The other option would be CT enterography.  We also discussed the option of checking thiopurine metabolite levels to see if he is at a good level with his Imuran.  After conversation he wishes to pursue the latter since he is asymptomatic.  If his level is normal range or near normal, then we will not adjust the dose especially since he has a history of intermediate TPMT enzyme levels.  We will plan observation.  If it is significantly low, then it may be reasonable to increase the dose of the Imuran.  We talked about the side effects of immunosuppression again.  He expressed understanding.  He agrees with this plan.  We will also check CBC, CMP, TB along with the metabolite levels.  If he has another episode he will contact us otherwise we will tentatively plan to see him back in the office in 12 months unless we decide to change therapy as above.  Of note, switching to a biologic such as Humira is still an option as we have discussed in the past.  Continue ongoing management by primary care provider and other specialists.     Body mass index is 23.46 kg/m².  Stable BMI      EMR Dragon/transcription  disclaimer:  Much of this encounter note is electronic transcription/translation of spoken language to printed text.  The electronic translation of spoken language may be erroneous, or at times, nonsensical words or phrases may be inadvertently transcribed.  Although I have reviewed the note for such errors, some may still exist.    Hector Mooney MD  16:04 CDT  04/13/23

## 2023-04-15 LAB
GAMMA INTERFERON BACKGROUND BLD IA-ACNC: 0.01 IU/ML
M TB IFN-G BLD-IMP: NEGATIVE
M TB IFN-G CD4+ T-CELLS BLD-ACNC: 0.01 IU/ML
M TBIFN-G CD4+ CD8+T-CELLS BLD-ACNC: 0.01 IU/ML
MITOGEN IGNF BLD-ACNC: >10 IU/ML
QUANTIFERON INCUBATION: NORMAL
SERVICE CMNT-IMP: NORMAL

## 2023-04-19 LAB
6-TGN ENTSUB RBC: 308 PMOL/8X 10E8
6MMP ENTSUB RBC: <175 PMOL/8X 10E8

## 2023-09-18 ENCOUNTER — TRANSCRIBE ORDERS (OUTPATIENT)
Dept: ADMINISTRATIVE | Facility: HOSPITAL | Age: 73
End: 2023-09-18
Payer: COMMERCIAL

## 2023-09-18 ENCOUNTER — LAB (OUTPATIENT)
Dept: LAB | Facility: HOSPITAL | Age: 73
End: 2023-09-18
Payer: COMMERCIAL

## 2023-09-18 DIAGNOSIS — Z13.6 ENCOUNTER FOR SCREENING FOR CARDIOVASCULAR DISORDERS: ICD-10-CM

## 2023-09-18 DIAGNOSIS — Z12.5 ENCOUNTER FOR SCREENING FOR MALIGNANT NEOPLASM OF PROSTATE: ICD-10-CM

## 2023-09-18 DIAGNOSIS — R73.03 PREDIABETES: ICD-10-CM

## 2023-09-18 DIAGNOSIS — Z12.5 ENCOUNTER FOR SCREENING FOR MALIGNANT NEOPLASM OF PROSTATE: Primary | ICD-10-CM

## 2023-09-18 LAB
ALBUMIN SERPL-MCNC: 3.8 G/DL (ref 3.5–5.2)
ALBUMIN UR-MCNC: <1.2 MG/DL
ALBUMIN/GLOB SERPL: 1.7 G/DL
ALP SERPL-CCNC: 108 U/L (ref 39–117)
ALT SERPL W P-5'-P-CCNC: 11 U/L (ref 1–41)
ANION GAP SERPL CALCULATED.3IONS-SCNC: 6 MMOL/L (ref 5–15)
AST SERPL-CCNC: 15 U/L (ref 1–40)
BACTERIA UR QL AUTO: ABNORMAL /HPF
BASOPHILS # BLD AUTO: 0.03 10*3/MM3 (ref 0–0.2)
BASOPHILS NFR BLD AUTO: 0.6 % (ref 0–1.5)
BILIRUB SERPL-MCNC: 0.5 MG/DL (ref 0–1.2)
BILIRUB UR QL STRIP: NEGATIVE
BUN SERPL-MCNC: 12 MG/DL (ref 8–23)
BUN/CREAT SERPL: 12.2 (ref 7–25)
CALCIUM SPEC-SCNC: 8.7 MG/DL (ref 8.6–10.5)
CHLORIDE SERPL-SCNC: 106 MMOL/L (ref 98–107)
CHOLEST SERPL-MCNC: 170 MG/DL (ref 0–200)
CLARITY UR: CLEAR
CO2 SERPL-SCNC: 28 MMOL/L (ref 22–29)
COLOR UR: YELLOW
CREAT SERPL-MCNC: 0.98 MG/DL (ref 0.76–1.27)
CREAT UR-MCNC: 145.3 MG/DL
DEPRECATED RDW RBC AUTO: 42.7 FL (ref 37–54)
EGFRCR SERPLBLD CKD-EPI 2021: 83 ML/MIN/1.73
EOSINOPHIL # BLD AUTO: 0.06 10*3/MM3 (ref 0–0.4)
EOSINOPHIL NFR BLD AUTO: 1.1 % (ref 0.3–6.2)
ERYTHROCYTE [DISTWIDTH] IN BLOOD BY AUTOMATED COUNT: 12.4 % (ref 12.3–15.4)
GLOBULIN UR ELPH-MCNC: 2.2 GM/DL
GLUCOSE SERPL-MCNC: 125 MG/DL (ref 65–99)
GLUCOSE UR STRIP-MCNC: NEGATIVE MG/DL
HBA1C MFR BLD: 5.5 % (ref 4.8–5.6)
HCT VFR BLD AUTO: 45.5 % (ref 37.5–51)
HDLC SERPL-MCNC: 59 MG/DL (ref 40–60)
HGB BLD-MCNC: 15 G/DL (ref 13–17.7)
HGB UR QL STRIP.AUTO: NEGATIVE
HYALINE CASTS UR QL AUTO: ABNORMAL /LPF
IMM GRANULOCYTES # BLD AUTO: 0.03 10*3/MM3 (ref 0–0.05)
IMM GRANULOCYTES NFR BLD AUTO: 0.6 % (ref 0–0.5)
KETONES UR QL STRIP: NEGATIVE
LDLC SERPL CALC-MCNC: 97 MG/DL (ref 0–100)
LDLC/HDLC SERPL: 1.63 {RATIO}
LEUKOCYTE ESTERASE UR QL STRIP.AUTO: ABNORMAL
LYMPHOCYTES # BLD AUTO: 0.91 10*3/MM3 (ref 0.7–3.1)
LYMPHOCYTES NFR BLD AUTO: 17 % (ref 19.6–45.3)
MCH RBC QN AUTO: 31 PG (ref 26.6–33)
MCHC RBC AUTO-ENTMCNC: 33 G/DL (ref 31.5–35.7)
MCV RBC AUTO: 94 FL (ref 79–97)
MICROALBUMIN/CREAT UR: NORMAL MG/G{CREAT}
MONOCYTES # BLD AUTO: 0.35 10*3/MM3 (ref 0.1–0.9)
MONOCYTES NFR BLD AUTO: 6.5 % (ref 5–12)
NEUTROPHILS NFR BLD AUTO: 3.97 10*3/MM3 (ref 1.7–7)
NEUTROPHILS NFR BLD AUTO: 74.2 % (ref 42.7–76)
NITRITE UR QL STRIP: NEGATIVE
NRBC BLD AUTO-RTO: 0 /100 WBC (ref 0–0.2)
PH UR STRIP.AUTO: 6 [PH] (ref 5–8)
PLATELET # BLD AUTO: 185 10*3/MM3 (ref 140–450)
PMV BLD AUTO: 9.7 FL (ref 6–12)
POTASSIUM SERPL-SCNC: 4.3 MMOL/L (ref 3.5–5.2)
PROT SERPL-MCNC: 6 G/DL (ref 6–8.5)
PROT UR QL STRIP: NEGATIVE
PSA SERPL-MCNC: 6.01 NG/ML (ref 0–4)
RBC # BLD AUTO: 4.84 10*6/MM3 (ref 4.14–5.8)
RBC # UR STRIP: ABNORMAL /HPF
REF LAB TEST METHOD: ABNORMAL
SODIUM SERPL-SCNC: 140 MMOL/L (ref 136–145)
SP GR UR STRIP: 1.02 (ref 1–1.03)
SQUAMOUS #/AREA URNS HPF: ABNORMAL /HPF
TRIGL SERPL-MCNC: 74 MG/DL (ref 0–150)
UROBILINOGEN UR QL STRIP: ABNORMAL
VLDLC SERPL-MCNC: 14 MG/DL (ref 5–40)
WBC # UR STRIP: ABNORMAL /HPF
WBC NRBC COR # BLD: 5.35 10*3/MM3 (ref 3.4–10.8)

## 2023-09-18 PROCEDURE — 80061 LIPID PANEL: CPT

## 2023-09-18 PROCEDURE — G0103 PSA SCREENING: HCPCS

## 2023-09-18 PROCEDURE — 85025 COMPLETE CBC W/AUTO DIFF WBC: CPT

## 2023-09-18 PROCEDURE — 82570 ASSAY OF URINE CREATININE: CPT

## 2023-09-18 PROCEDURE — 81001 URINALYSIS AUTO W/SCOPE: CPT

## 2023-09-18 PROCEDURE — 80053 COMPREHEN METABOLIC PANEL: CPT

## 2023-09-18 PROCEDURE — 82043 UR ALBUMIN QUANTITATIVE: CPT

## 2023-09-18 PROCEDURE — 36415 COLL VENOUS BLD VENIPUNCTURE: CPT

## 2023-09-18 PROCEDURE — 83036 HEMOGLOBIN GLYCOSYLATED A1C: CPT

## 2023-10-30 RX ORDER — AZATHIOPRINE 50 MG/1
50 TABLET ORAL DAILY
Qty: 30 TABLET | Refills: 5 | Status: SHIPPED | OUTPATIENT
Start: 2023-10-30

## 2023-12-08 ENCOUNTER — NURSE TRIAGE (OUTPATIENT)
Dept: CALL CENTER | Facility: HOSPITAL | Age: 73
End: 2023-12-08
Payer: COMMERCIAL

## 2023-12-08 NOTE — TELEPHONE ENCOUNTER
"Caller requesting to speak with PCP or his nurse regarding sinus infection.  Requesting call back.    Instructed caller will send message but he can also call back after 1pm when office reopens after lunch.    Reason for Disposition   [1] Caller requests to speak to PCP now AND [2] won't tell us reason for call  (Exception: If 10 pm to 6 am, caller must first discuss reason for the call.)    Additional Information   Negative: Lab calling with strep throat test results and triager can call in prescription   Negative: Lab calling with urinalysis test results and triager can call in prescription   Negative: Medication questions   Negative: Medication renewal and refill questions   Negative: Pre-operative or pre-procedural questions   Negative: ED call to PCP (i.e., primary care provider; doctor, NP, or PA)   Negative: Doctor (or NP/PA) call to PCP   Negative: Call about patient who is currently hospitalized   Negative: Lab or radiology calling with CRITICAL test results   Negative: [1] Follow-up call from patient regarding patient's clinical status AND [2] information urgent   Negative: [1] Caller requests to speak ONLY to PCP AND [2] URGENT question   Negative: Notification of hospital admission   Negative: Notification of death   Negative: Caller requesting lab results  (Exception: Routine or non-urgent lab result.)   Negative: Lab or radiology calling with test results   Negative: [1] Follow-up call from patient regarding patient's clinical status AND [2] information NON-URGENT   Negative: [1] Caller requests to speak ONLY to PCP AND [2] NON-URGENT question   Negative: Caller requesting an appointment, triage offered and declined    Answer Assessment - Initial Assessment Questions  1. REASON FOR CALL or QUESTION: \"What is your reason for calling today?\" or \"How can I best  help you?\" or \"What question do you have that I can help answer?\"      Requesting to speak with Dr. Rendon regarding sinus infection  2. CALLER: " Document the source of call. (e.g., laboratory, patient).      Patient    Protocols used: PCP Call - No Triage-ADULT-

## 2023-12-26 ENCOUNTER — NURSE TRIAGE (OUTPATIENT)
Dept: CALL CENTER | Facility: HOSPITAL | Age: 73
End: 2023-12-26
Payer: COMMERCIAL

## 2023-12-26 NOTE — TELEPHONE ENCOUNTER
Reason for Disposition   Prescription request for new medicine (not a refill)    Additional Information   Negative: [1] Intentional drug overdose AND [2] suicidal thoughts or ideas   Negative: Drug overdose and triager unable to answer question   Negative: Caller requesting a renewal or refill of a medicine patient is currently taking   Negative: Caller requesting information unrelated to medicine   Negative: Caller requesting information about COVID-19 Vaccine   Negative: Caller requesting information about Emergency Contraception   Negative: Caller requesting information about Combined Birth Control Pills   Negative: Caller requesting information about Progestin Birth Control Pills   Negative: Caller requesting information about Post-Op pain or medicines   Negative: Caller requesting a prescription antibiotic (such as Penicillin) for Strep throat and has a positive culture result   Negative: Caller requesting a prescription anti-viral med (such as Tamiflu) and has influenza (flu) symptoms   Negative: Immunization reaction suspected   Negative: Rash while taking a medicine or within 3 days of stopping it   Negative: [1] Asthma and [2] having symptoms of asthma (cough, wheezing, etc.)   Negative: [1] Symptom of illness (e.g., headache, abdominal pain, earache, vomiting) AND [2] more than mild   Negative: Breastfeeding questions about mother's medicines and diet   Negative: MORE THAN A DOUBLE DOSE of a prescription or over-the-counter (OTC) drug   Negative: [1] DOUBLE DOSE (an extra dose or lesser amount) of prescription drug AND [2] any symptoms (e.g., dizziness, nausea, pain, sleepiness)   Negative: [1] DOUBLE DOSE (an extra dose or lesser amount) of over-the-counter (OTC) drug AND [2] any symptoms (e.g., dizziness, nausea, pain, sleepiness)   Negative: Took another person's prescription drug   Negative: [1] DOUBLE DOSE (an extra dose or lesser amount) of prescription drug AND [2] NO symptoms  (Exception: A double  "dose of antibiotics.)   Negative: Diabetes drug error or overdose (e.g., took wrong type of insulin or took extra dose)   Negative: [1] Prescription not at pharmacy AND [2] was prescribed by PCP recently (Exception: Triager has access to EMR and prescription is recorded there. Go to Home Care and confirm for pharmacy.)   Negative: [1] Pharmacy calling with prescription question AND [2] triager unable to answer question   Negative: [1] Caller has URGENT medicine question about med that PCP or specialist prescribed AND [2] triager unable to answer question   Negative: Medicine patch causing local rash or itching   Negative: [1] Caller has medicine question about med NOT prescribed by PCP AND [2] triager unable to answer question (e.g., compatibility with other med, storage)    Answer Assessment - Initial Assessment Questions  1. NAME of MEDICINE: \"What medicine(s) are you calling about?\"      Viagra  2. QUESTION: \"What is your question?\" (e.g., double dose of medicine, side effect)      Asking to have this medication restarted   3. PRESCRIBER: \"Who prescribed the medicine?\" Reason: if prescribed by specialist, call should be referred to that group.      Dr. Rendon  4. SYMPTOMS: \"Do you have any symptoms?\" If Yes, ask: \"What symptoms are you having?\"  \"How bad are the symptoms (e.g., mild, moderate, severe)      na  5. PREGNANCY:  \"Is there any chance that you are pregnant?\" \"When was your last menstrual period?\"      na    Protocols used: Medication Question Call-ADULT-    "

## 2024-04-05 ENCOUNTER — TELEPHONE (OUTPATIENT)
Dept: GASTROENTEROLOGY | Facility: CLINIC | Age: 74
End: 2024-04-05
Payer: COMMERCIAL

## 2024-04-05 DIAGNOSIS — Z51.81 ENCOUNTER FOR MONITORING IMMUNOMODULATING THERAPY: Primary | ICD-10-CM

## 2024-04-05 DIAGNOSIS — Z79.899 ENCOUNTER FOR MONITORING IMMUNOMODULATING THERAPY: Primary | ICD-10-CM

## 2024-05-03 ENCOUNTER — TELEPHONE (OUTPATIENT)
Dept: GASTROENTEROLOGY | Facility: CLINIC | Age: 74
End: 2024-05-03
Payer: COMMERCIAL

## 2024-05-03 DIAGNOSIS — Z51.81 ENCOUNTER FOR MONITORING IMMUNOMODULATING THERAPY: Primary | ICD-10-CM

## 2024-05-03 DIAGNOSIS — Z79.899 ENCOUNTER FOR MONITORING IMMUNOMODULATING THERAPY: Primary | ICD-10-CM

## 2024-05-03 RX ORDER — AZATHIOPRINE 50 MG/1
50 TABLET ORAL DAILY
Qty: 30 TABLET | Refills: 1 | Status: SHIPPED | OUTPATIENT
Start: 2024-05-03

## 2024-05-03 NOTE — TELEPHONE ENCOUNTER
Nabil, I renewed his prescription for imuran. Please call and see if he has had any recent at his pcp office. If not he should be getting cbc and cmp every 3 mo.

## 2024-05-03 NOTE — TELEPHONE ENCOUNTER
Pt and Dr Tovar office state the pt has had no labs since last Sept. I believe the pt is due a TB test also along with other labs.

## 2024-05-14 ENCOUNTER — LAB (OUTPATIENT)
Dept: LAB | Facility: HOSPITAL | Age: 74
End: 2024-05-14
Payer: COMMERCIAL

## 2024-05-14 DIAGNOSIS — Z51.81 ENCOUNTER FOR MONITORING IMMUNOMODULATING THERAPY: ICD-10-CM

## 2024-05-14 DIAGNOSIS — Z79.899 ENCOUNTER FOR MONITORING IMMUNOMODULATING THERAPY: ICD-10-CM

## 2024-05-14 LAB
ALBUMIN SERPL-MCNC: 3.9 G/DL (ref 3.5–5.2)
ALP SERPL-CCNC: 98 U/L (ref 39–117)
ALT SERPL W P-5'-P-CCNC: 19 U/L (ref 1–41)
AST SERPL-CCNC: 18 U/L (ref 1–40)
BILIRUB CONJ SERPL-MCNC: <0.2 MG/DL (ref 0–0.3)
BILIRUB INDIRECT SERPL-MCNC: ABNORMAL MG/DL
BILIRUB SERPL-MCNC: 0.5 MG/DL (ref 0–1.2)
DEPRECATED RDW RBC AUTO: 43.6 FL (ref 37–54)
ERYTHROCYTE [DISTWIDTH] IN BLOOD BY AUTOMATED COUNT: 12.5 % (ref 12.3–15.4)
HCT VFR BLD AUTO: 44.1 % (ref 37.5–51)
HGB BLD-MCNC: 14.9 G/DL (ref 13–17.7)
MCH RBC QN AUTO: 31.6 PG (ref 26.6–33)
MCHC RBC AUTO-ENTMCNC: 33.8 G/DL (ref 31.5–35.7)
MCV RBC AUTO: 93.4 FL (ref 79–97)
PLATELET # BLD AUTO: 192 10*3/MM3 (ref 140–450)
PMV BLD AUTO: 9.7 FL (ref 6–12)
PROT SERPL-MCNC: 5.9 G/DL (ref 6–8.5)
RBC # BLD AUTO: 4.72 10*6/MM3 (ref 4.14–5.8)
WBC NRBC COR # BLD AUTO: 5.14 10*3/MM3 (ref 3.4–10.8)

## 2024-05-14 PROCEDURE — 36415 COLL VENOUS BLD VENIPUNCTURE: CPT

## 2024-05-14 PROCEDURE — 86480 TB TEST CELL IMMUN MEASURE: CPT

## 2024-05-14 PROCEDURE — 80076 HEPATIC FUNCTION PANEL: CPT

## 2024-05-14 PROCEDURE — 85027 COMPLETE CBC AUTOMATED: CPT

## 2024-05-16 LAB
GAMMA INTERFERON BACKGROUND BLD IA-ACNC: 0.03 IU/ML
M TB IFN-G BLD-IMP: NEGATIVE
M TB IFN-G CD4+ BCKGRND COR BLD-ACNC: 0.06 IU/ML
M TB IFN-G CD4+CD8+ BCKGRND COR BLD-ACNC: 0.05 IU/ML
MITOGEN IGNF BCKGRD COR BLD-ACNC: >10 IU/ML
SERVICE CMNT-IMP: NORMAL

## (undated) DEVICE — FRCP BX RADJAW4 NDL 2.8 240 STD OG

## (undated) DEVICE — YANKAUER,BULB TIP WITH VENT: Brand: ARGYLE

## (undated) DEVICE — ENDOGATOR AUXILIARY WATER JET CONNECTOR: Brand: ENDOGATOR

## (undated) DEVICE — MASK,OXYGEN,MED CONC,ADLT,7' TUB, UC: Brand: PENDING

## (undated) DEVICE — SENSR O2 OXIMAX FNGR A/ 18IN NONSTR

## (undated) DEVICE — CUFF,BP,DISP,1 TUBE,ADULT,HP: Brand: MEDLINE

## (undated) DEVICE — Device: Brand: DEFENDO AIR/WATER/SUCTION AND BIOPSY VALVE

## (undated) DEVICE — TBG SMPL FLTR LINE NASL 02/C02 A/ BX/100

## (undated) DEVICE — THE CHANNEL CLEANING BRUSH IS A NYLON FLEXI BRUSH ATTACHED TO A FLEXIBLE PLASTIC SHEATH DESIGNED TO SAFELY REMOVE DEBRIS FROM FLEXIBLE ENDOSCOPES.